# Patient Record
Sex: FEMALE | Race: WHITE | NOT HISPANIC OR LATINO | Employment: OTHER | URBAN - METROPOLITAN AREA
[De-identification: names, ages, dates, MRNs, and addresses within clinical notes are randomized per-mention and may not be internally consistent; named-entity substitution may affect disease eponyms.]

---

## 2018-04-16 RX ORDER — FLUTICASONE PROPIONATE 50 MCG
SPRAY, SUSPENSION (ML) NASAL
COMMUNITY
Start: 2017-12-04 | End: 2019-08-27 | Stop reason: ALTCHOICE

## 2018-04-16 RX ORDER — PANTOPRAZOLE SODIUM 40 MG/1
40 TABLET, DELAYED RELEASE ORAL
COMMUNITY
Start: 2017-12-04 | End: 2018-10-21 | Stop reason: SDUPTHER

## 2018-04-16 RX ORDER — HYDROCHLOROTHIAZIDE 12.5 MG/1
12.5 TABLET ORAL
COMMUNITY
Start: 2017-12-04 | End: 2018-08-27 | Stop reason: SDUPTHER

## 2018-04-16 RX ORDER — RAMIPRIL 10 MG/1
10 CAPSULE ORAL DAILY
COMMUNITY
Start: 2017-12-20 | End: 2018-09-18 | Stop reason: SDUPTHER

## 2018-04-16 RX ORDER — PAROXETINE HYDROCHLORIDE HEMIHYDRATE 12.5 MG/1
12.5 TABLET, FILM COATED, EXTENDED RELEASE ORAL
COMMUNITY
Start: 2017-12-04 | End: 2018-10-28 | Stop reason: SDUPTHER

## 2018-04-16 RX ORDER — EZETIMIBE 10 MG/1
10 TABLET ORAL
COMMUNITY
Start: 2017-12-04 | End: 2018-10-21 | Stop reason: SDUPTHER

## 2018-04-16 RX ORDER — NAPROXEN SODIUM 550 MG/1
550 TABLET ORAL
COMMUNITY
Start: 2017-12-04 | End: 2018-08-03 | Stop reason: SDUPTHER

## 2018-04-16 RX ORDER — ASPIRIN 81 MG/1
81 TABLET ORAL
COMMUNITY
Start: 2016-11-29 | End: 2019-11-19 | Stop reason: ALTCHOICE

## 2018-04-16 RX ORDER — ROSUVASTATIN CALCIUM 40 MG/1
40 TABLET, COATED ORAL
COMMUNITY
Start: 2017-12-26 | End: 2018-12-20 | Stop reason: SDUPTHER

## 2018-04-16 RX ORDER — METOPROLOL SUCCINATE 100 MG/1
100 TABLET, EXTENDED RELEASE ORAL
COMMUNITY
Start: 2017-12-04 | End: 2018-08-27 | Stop reason: SDUPTHER

## 2018-04-17 ENCOUNTER — OFFICE VISIT (OUTPATIENT)
Dept: INTERNAL MEDICINE | Facility: CLINIC | Age: 68
End: 2018-04-17
Attending: NURSE PRACTITIONER
Payer: MEDICARE

## 2018-04-17 VITALS
HEART RATE: 69 BPM | SYSTOLIC BLOOD PRESSURE: 104 MMHG | DIASTOLIC BLOOD PRESSURE: 60 MMHG | HEIGHT: 63 IN | OXYGEN SATURATION: 98 % | WEIGHT: 169 LBS | RESPIRATION RATE: 16 BRPM | TEMPERATURE: 96.4 F | BODY MASS INDEX: 29.95 KG/M2

## 2018-04-17 DIAGNOSIS — R92.8 ABNORMAL MAMMOGRAM: ICD-10-CM

## 2018-04-17 DIAGNOSIS — M19.041 ARTHRITIS OF BOTH HANDS: ICD-10-CM

## 2018-04-17 DIAGNOSIS — R73.9 HYPERGLYCEMIA: ICD-10-CM

## 2018-04-17 DIAGNOSIS — Z91.89 OTHER SPECIFIED PERSONAL RISK FACTORS, NOT ELSEWHERE CLASSIFIED: ICD-10-CM

## 2018-04-17 DIAGNOSIS — Z13.1 ENCOUNTER FOR SCREENING FOR DIABETES MELLITUS: ICD-10-CM

## 2018-04-17 DIAGNOSIS — Z00.00 MEDICARE ANNUAL WELLNESS VISIT, INITIAL: ICD-10-CM

## 2018-04-17 DIAGNOSIS — M19.042 ARTHRITIS OF BOTH HANDS: ICD-10-CM

## 2018-04-17 PROCEDURE — G0438 PPPS, INITIAL VISIT: HCPCS | Performed by: NURSE PRACTITIONER

## 2018-04-17 RX ORDER — HYDROCORTISONE ACETATE 25 MG/1
25 SUPPOSITORY RECTAL 2 TIMES DAILY
Qty: 20 SUPPOSITORY | Refills: 0 | Status: SHIPPED | OUTPATIENT
Start: 2018-04-17 | End: 2019-08-27 | Stop reason: ALTCHOICE

## 2018-04-17 ASSESSMENT — ENCOUNTER SYMPTOMS
ABDOMINAL PAIN: 0
CHILLS: 0
NAUSEA: 0
CHEST TIGHTNESS: 0
DIARRHEA: 0
FEVER: 0
TROUBLE SWALLOWING: 0
RECTAL PAIN: 1
SHORTNESS OF BREATH: 0
VOMITING: 0
SORE THROAT: 0
COUGH: 0
WHEEZING: 0
ARTHRALGIAS: 1
EYE PAIN: 0
PALPITATIONS: 0
CONSTIPATION: 0
ABDOMINAL DISTENTION: 0

## 2018-04-17 ASSESSMENT — MINI COG
TOTAL SCORE: 5
COMPLETED: YES

## 2018-04-17 NOTE — PATIENT INSTRUCTIONS
Decrease ramipril to 1 tab daily    See Dr Shelby Hernandez for the hand    Schedule your colonoscopy

## 2018-04-17 NOTE — PROGRESS NOTES
"    Subjective     Patient ID: Forrest Mueller is a 67 y.o. female.    PT here today for MAW and BP check    She has hemorrhoids  Occasionally they will get inflamed  Rare bright red bleeding          Review of Systems   Constitutional: Negative for chills and fever.   HENT: Negative for ear pain, hearing loss, sore throat and trouble swallowing.    Eyes: Negative for pain and visual disturbance.   Respiratory: Negative for cough, chest tightness, shortness of breath and wheezing.    Cardiovascular: Negative for chest pain, palpitations and leg swelling.   Gastrointestinal: Positive for rectal pain. Negative for abdominal distention, abdominal pain, constipation, diarrhea, nausea and vomiting.   Musculoskeletal: Positive for arthralgias (BL 1st CMC joints the worst).       Objective     Vitals:    04/17/18 0710 04/17/18 0753   BP: 124/82 104/60   BP Location: Right upper arm    Pulse: 69    Resp: 16    Temp: (!) 35.8 °C (96.4 °F)    TempSrc: Tympanic    SpO2: 98%    Weight: 76.7 kg (169 lb)    Height: 1.6 m (5' 3\")      Body mass index is 29.94 kg/m².    Physical Exam   Constitutional: She is oriented to person, place, and time. She appears well-developed and well-nourished.   HENT:   Head: Normocephalic and atraumatic.   Right Ear: External ear normal.   Left Ear: External ear normal.   Nose: Nose normal.   Mouth/Throat: Oropharynx is clear and moist. No oropharyngeal exudate.   Eyes: Conjunctivae are normal.   Neck: Normal range of motion.   Cardiovascular: Normal rate, regular rhythm, normal heart sounds and intact distal pulses.    Pulmonary/Chest: Effort normal and breath sounds normal. No respiratory distress. She has no wheezes.   Musculoskeletal:   BL hands with arthritic changes   Lymphadenopathy:     She has no cervical adenopathy.   Neurological: She is alert and oriented to person, place, and time.   Psychiatric: She has a normal mood and affect. Her behavior is normal. Thought content normal. "       Assessment/Plan   Problem List Items Addressed This Visit     None      Visit Diagnoses     Medicare annual wellness visit, initial        Arthritis of both hands        Abnormal mammogram        Relevant Orders    BI DIAGNOSTIC MAMMOGRAM BILATERAL    Hyperglycemia         Relevant Orders    Hemoglobin A1c    Other specified personal risk factors, not elsewhere classified        Relevant Orders    HEPATITIS C, Medicare Screening    Encounter for screening for diabetes mellitus        Relevant Orders    Hemoglobin A1c          Barak Mueller is a 67 y.o. female who presents for a subsequent annual wellness visit.     Comprehensive Medical and Social History  Patient Active Problem List   Diagnosis   • Blood glucose abnormal   • Chronic coronary artery disease   • Basal cell carcinoma   • Chicken pox   • Cerebral vascular disease   • Chronic depression   • Diverticulosis   • Endometriosis   • Family history of malignant neoplasm of breast   • Family history of cardiovascular disease   • Goiter   • Hemorrhoids   • History of non anemic vitamin B12 deficiency   • Hypertension   • Hyperlipidemia   • Osteoarthritis   • Osteopenia   • Thyroid nodule     Past Medical History:   Diagnosis Date   • Basal cell carcinoma    • Depression    • Diverticulitis of colon    • Endometrioma    • Hypertension    • Lipid disorder    • Osteoarthritis    • Thyroid nodule      Past Surgical History:   Procedure Laterality Date   • DILATION AND CURETTAGE OF UTERUS     • HYSTERECTOMY     • LAPAROTOMY OOPHERECTOMY     • WRIST SURGERY Right      Allergies   Allergen Reactions   • No Known Allergies      Current Outpatient Prescriptions   Medication Sig Dispense Refill   • aspirin (ASPIRIN LOW DOSE) 81 mg enteric coated tablet 81 mg.     • ezetimibe (ZETIA) 10 mg tablet 10 mg.     • fluticasone (FLONASE) 50 mcg/actuation nasal spray inhale 1 spray by intranasal route  every day in each nostril     •  "hydrochlorothiazide (HYDRODIURIL) 12.5 mg tablet 12.5 mg.     • metoprolol succinate XL (TOPROL-XL) 100 mg 24 hr tablet 100 mg.     • naproxen sodium (ANAPROX) 550 mg tablet 550 mg.     • pantoprazole (PROTONIX) 40 mg EC tablet 40 mg.     • PARoxetine CR (PAXIL-CR) 12.5 mg 24 hr tablet 12.5 mg.     • ramipril (ALTACE) 10 mg capsule Take 10 mg by mouth daily.      • rosuvastatin (CRESTOR) 40 mg tablet 40 mg.     • hydrocortisone (ANUSOL-HC) 25 mg suppository Insert 1 suppository (25 mg total) into the rectum 2 (two) times a day for 10 days. 20 suppository 0     No current facility-administered medications for this visit.      Social History     Social History   • Marital status:      Spouse name: N/A   • Number of children: N/A   • Years of education: N/A     Social History Main Topics   • Smoking status: Never Smoker   • Smokeless tobacco: Never Used   • Alcohol use None   • Drug use: Unknown   • Sexual activity: Not Asked     Other Topics Concern   • None     Social History Narrative   • None       Objective   Vitals  Vitals:    04/17/18 0710 04/17/18 0753   BP: 124/82 104/60   BP Location: Right upper arm    Pulse: 69    Resp: 16    Temp: (!) 35.8 °C (96.4 °F)    TempSrc: Tympanic    SpO2: 98%    Weight: 76.7 kg (169 lb)    Height: 1.6 m (5' 3\")      Body mass index is 29.94 kg/m².    Advanced Care Plan  Does patient have advance directive?: Yes       Patient has Advance Directive: Patient has Advance Directive, has not brought in                             PHQ  Over the Past 2 Weeks, Have You Felt Down, Depressed or Hopeless?: no  Over the Past 2 Weeks, Have You Felt Little Interest or Pleasure In Doing Things?: no                                          Mini Cog  Completed: Yes  Score: 5  Result: Negative    Get Up and Go  Result: Pass            STEADI Falls Risk  One or more falls in the last year: No           Has trouble stepping up onto a curb: No   Advised to use a cane or walker to get around " safely: No   Often has to rush to the toilet: No   Feels unsteady when walking: No   Has lost some feeling in feet: No   Often feels sad or depressed: No   Steadies self on furniture while walking at home: No   Takes medication that makes him/her feel lightheaded or more tired than usual: No   Worried about falling: No   Takes medicine to sleep or improve mood: No   Needs to push with hands when rising from a chair: No   Falls screen completed: Yes       Hearing and Vision Screening  Vision Screening Comments: Sees eye doctor      Assessment/Plan   Problem List Items Addressed This Visit     None      Visit Diagnoses     Medicare annual wellness visit, initial        Arthritis of both hands        Abnormal mammogram        Relevant Orders    BI DIAGNOSTIC MAMMOGRAM BILATERAL    Hyperglycemia         Relevant Orders    Hemoglobin A1c    Other specified personal risk factors, not elsewhere classified        Relevant Orders    HEPATITIS C, Medicare Screening    Encounter for screening for diabetes mellitus        Relevant Orders    Hemoglobin A1c          See Patient Instructions (the written plan) which was given to the patient for PPPS and health risk factors with interventions.     1. Medicare annual wellness visit, initial  See scanned sheets.  PT doing quite well.      2. Arthritis of both hands  PT will follow up with hand specialist for ongoing BL 1st CMC pain.    3. Abnormal mammogram  Due for repeat mammo in May  - BI DIAGNOSTIC MAMMOGRAM BILATERAL; Future    4. Hyperglycemia   Check A1c, screening  - Hemoglobin A1c; Future    5. Other specified personal risk factors, not elsewhere classified  Check hep c screen  - HEPATITIS C, Medicare Screening; Future    6. Encounter for screening for diabetes mellitus  Check a1c  - Hemoglobin A1c; Future    NELSON Romo

## 2018-05-31 ENCOUNTER — TELEPHONE (OUTPATIENT)
Dept: INTERNAL MEDICINE | Facility: CLINIC | Age: 68
End: 2018-05-31

## 2018-05-31 ENCOUNTER — CLINICAL SUPPORT (OUTPATIENT)
Dept: INTERNAL MEDICINE | Facility: CLINIC | Age: 68
End: 2018-05-31
Payer: MEDICARE

## 2018-05-31 VITALS
OXYGEN SATURATION: 98 % | TEMPERATURE: 97.8 F | HEART RATE: 64 BPM | SYSTOLIC BLOOD PRESSURE: 130 MMHG | DIASTOLIC BLOOD PRESSURE: 84 MMHG

## 2018-05-31 DIAGNOSIS — Z12.39 SCREENING FOR BREAST CANCER: ICD-10-CM

## 2018-05-31 DIAGNOSIS — I10 ESSENTIAL HYPERTENSION: Primary | ICD-10-CM

## 2018-05-31 PROCEDURE — 99211 OFF/OP EST MAY X REQ PHY/QHP: CPT | Performed by: NURSE PRACTITIONER

## 2018-06-07 ENCOUNTER — HOSPITAL ENCOUNTER (OUTPATIENT)
Dept: RADIOLOGY | Age: 68
Discharge: HOME | End: 2018-06-07
Attending: RADIOLOGY
Payer: MEDICARE

## 2018-06-07 ENCOUNTER — HOSPITAL ENCOUNTER (OUTPATIENT)
Dept: RADIOLOGY | Age: 68
Discharge: HOME | End: 2018-06-07
Attending: NURSE PRACTITIONER
Payer: MEDICARE

## 2018-06-07 DIAGNOSIS — R92.8 ABNORMAL MAMMOGRAM: ICD-10-CM

## 2018-06-07 DIAGNOSIS — Z12.39 SCREENING FOR BREAST CANCER: ICD-10-CM

## 2018-06-07 PROCEDURE — 77066 DX MAMMO INCL CAD BI: CPT

## 2018-06-07 PROCEDURE — 76642 ULTRASOUND BREAST LIMITED: CPT | Mod: LT

## 2018-07-17 ENCOUNTER — APPOINTMENT (OUTPATIENT)
Dept: LAB | Facility: HOSPITAL | Age: 68
End: 2018-07-17
Attending: NURSE PRACTITIONER
Payer: COMMERCIAL

## 2018-07-17 ENCOUNTER — OFFICE VISIT (OUTPATIENT)
Dept: INTERNAL MEDICINE | Facility: CLINIC | Age: 68
End: 2018-07-17
Payer: COMMERCIAL

## 2018-07-17 VITALS
SYSTOLIC BLOOD PRESSURE: 112 MMHG | OXYGEN SATURATION: 97 % | TEMPERATURE: 98.2 F | DIASTOLIC BLOOD PRESSURE: 80 MMHG | HEART RATE: 69 BPM | BODY MASS INDEX: 30.19 KG/M2 | WEIGHT: 170.4 LBS

## 2018-07-17 DIAGNOSIS — M79.642 BILATERAL HAND PAIN: ICD-10-CM

## 2018-07-17 DIAGNOSIS — M79.641 BILATERAL HAND PAIN: ICD-10-CM

## 2018-07-17 DIAGNOSIS — Z91.89 OTHER SPECIFIED PERSONAL RISK FACTORS, NOT ELSEWHERE CLASSIFIED: ICD-10-CM

## 2018-07-17 DIAGNOSIS — I10 ESSENTIAL HYPERTENSION: Primary | ICD-10-CM

## 2018-07-17 DIAGNOSIS — Z13.1 ENCOUNTER FOR SCREENING FOR DIABETES MELLITUS: ICD-10-CM

## 2018-07-17 DIAGNOSIS — F32.A CHRONIC DEPRESSION: ICD-10-CM

## 2018-07-17 DIAGNOSIS — R73.9 HYPERGLYCEMIA: ICD-10-CM

## 2018-07-17 LAB
EST. AVERAGE GLUCOSE BLD GHB EST-MCNC: 114 MG/DL
HBA1C MFR BLD HPLC: 5.6 %
HCV AB SER QL: NONREACTIVE S/CO

## 2018-07-17 PROCEDURE — 83036 HEMOGLOBIN GLYCOSYLATED A1C: CPT

## 2018-07-17 PROCEDURE — G0472 HEP C SCREEN HIGH RISK/OTHER: HCPCS | Mod: GZ

## 2018-07-17 PROCEDURE — 99214 OFFICE O/P EST MOD 30 MIN: CPT | Performed by: NURSE PRACTITIONER

## 2018-07-17 PROCEDURE — 36415 COLL VENOUS BLD VENIPUNCTURE: CPT

## 2018-07-17 ASSESSMENT — ENCOUNTER SYMPTOMS
PALPITATIONS: 0
SHORTNESS OF BREATH: 0
SORE THROAT: 0
NAUSEA: 0
FEVER: 0
CHILLS: 0
VOMITING: 0
NECK STIFFNESS: 0
WHEEZING: 0
ABDOMINAL PAIN: 0
TROUBLE SWALLOWING: 0
JOINT SWELLING: 0
ARTHRALGIAS: 1
CHEST TIGHTNESS: 0
DIARRHEA: 0
CONSTIPATION: 0
ABDOMINAL DISTENTION: 0
COUGH: 0
EYE PAIN: 0
NECK PAIN: 0

## 2018-07-17 NOTE — PROGRESS NOTES
Subjective     Patient ID: Forrest Mueller is a 67 y.o. female.    Ongoing BL hand pain  Sometimes burning  Hasn't yet f/u with specialist  In the joints    Needs blood drawn          Review of Systems   Constitutional: Negative for chills and fever.   HENT: Negative for ear pain, hearing loss, sore throat and trouble swallowing.    Eyes: Negative for pain and visual disturbance.   Respiratory: Negative for cough, chest tightness, shortness of breath and wheezing.    Cardiovascular: Negative for chest pain, palpitations and leg swelling.   Gastrointestinal: Negative for abdominal distention, abdominal pain, constipation, diarrhea, nausea and vomiting.   Musculoskeletal: Positive for arthralgias (BL hands). Negative for gait problem, joint swelling, neck pain and neck stiffness.       Objective     Vitals:    07/17/18 0653   BP: 112/80   Pulse: 69   Temp: 36.8 °C (98.2 °F)   SpO2: 97%   Weight: 77.3 kg (170 lb 6.4 oz)     Body mass index is 30.19 kg/m².    Physical Exam   Constitutional: She is oriented to person, place, and time. She appears well-developed and well-nourished.   HENT:   Head: Normocephalic and atraumatic.   Right Ear: Tympanic membrane and external ear normal.   Left Ear: Tympanic membrane and external ear normal.   Nose: Nose normal.   Mouth/Throat: Oropharynx is clear and moist. No oropharyngeal exudate.   Eyes: Conjunctivae are normal.   Neck: Normal range of motion. Neck supple.   Cardiovascular: Normal rate, regular rhythm, normal heart sounds and intact distal pulses.  Exam reveals no gallop and no friction rub.    No murmur heard.  Pulmonary/Chest: Effort normal and breath sounds normal. No respiratory distress. She has no wheezes. She has no rales.   Abdominal: Soft. Bowel sounds are normal. She exhibits no distension and no mass. There is no tenderness. There is no guarding.   Musculoskeletal: Normal range of motion. She exhibits deformity. She exhibits no edema or tenderness.    Neg phalen and neg tinel sign  BL hands arthritis changes   Lymphadenopathy:     She has no cervical adenopathy.   Neurological: She is alert and oriented to person, place, and time. No cranial nerve deficit or sensory deficit.   Skin: Skin is warm and dry. No rash noted. No erythema.   Psychiatric: She has a normal mood and affect. Her behavior is normal. Judgment and thought content normal.       Assessment/Plan   Problem List Items Addressed This Visit        Other    Chronic depression    Hypertension - Primary      Other Visit Diagnoses     Bilateral hand pain              1. Essential hypertension  Blood pressure with good control.  Continue current regimen.    2. Chronic depression  Affect bright and appropriate.  Patient is stable with Paxil.  Continue current regimen.    3. Bilateral hand pain  Patient will follow up with hand specialist.  She does have strong family history of arthritis and does have arthritic changes in most of her fingers.  X-rays will be done per specialist.    NELSON Romo

## 2018-08-03 RX ORDER — NAPROXEN SODIUM 550 MG/1
TABLET ORAL
Qty: 180 TABLET | Refills: 1 | Status: SHIPPED | OUTPATIENT
Start: 2018-08-03 | End: 2019-08-27 | Stop reason: ALTCHOICE

## 2018-08-27 RX ORDER — HYDROCHLOROTHIAZIDE 12.5 MG/1
TABLET ORAL
Qty: 90 TABLET | Refills: 2 | Status: SHIPPED | OUTPATIENT
Start: 2018-08-27 | End: 2019-05-14 | Stop reason: SDUPTHER

## 2018-08-27 RX ORDER — METOPROLOL SUCCINATE 100 MG/1
TABLET, EXTENDED RELEASE ORAL
Qty: 90 TABLET | Refills: 2 | Status: SHIPPED | OUTPATIENT
Start: 2018-08-27 | End: 2019-05-14 | Stop reason: SDUPTHER

## 2018-09-18 RX ORDER — RAMIPRIL 10 MG/1
CAPSULE ORAL
Qty: 180 CAPSULE | Refills: 2 | Status: SHIPPED | OUTPATIENT
Start: 2018-09-18 | End: 2019-05-14 | Stop reason: SDUPTHER

## 2018-10-22 RX ORDER — EZETIMIBE 10 MG/1
TABLET ORAL
Qty: 90 TABLET | Refills: 2 | Status: SHIPPED | OUTPATIENT
Start: 2018-10-22 | End: 2019-05-14 | Stop reason: SDUPTHER

## 2018-10-22 RX ORDER — PANTOPRAZOLE SODIUM 40 MG/1
TABLET, DELAYED RELEASE ORAL
Qty: 90 TABLET | Refills: 2 | Status: SHIPPED | OUTPATIENT
Start: 2018-10-22 | End: 2019-05-14 | Stop reason: SDUPTHER

## 2018-10-23 ENCOUNTER — OFFICE VISIT (OUTPATIENT)
Dept: INTERNAL MEDICINE | Facility: CLINIC | Age: 68
End: 2018-10-23
Payer: COMMERCIAL

## 2018-10-23 VITALS
TEMPERATURE: 98 F | DIASTOLIC BLOOD PRESSURE: 86 MMHG | SYSTOLIC BLOOD PRESSURE: 132 MMHG | OXYGEN SATURATION: 97 % | HEART RATE: 67 BPM | BODY MASS INDEX: 29.78 KG/M2 | HEIGHT: 64 IN | WEIGHT: 174.4 LBS

## 2018-10-23 DIAGNOSIS — E78.5 HYPERLIPIDEMIA, UNSPECIFIED HYPERLIPIDEMIA TYPE: Primary | ICD-10-CM

## 2018-10-23 DIAGNOSIS — Z23 FLU VACCINE NEED: ICD-10-CM

## 2018-10-23 DIAGNOSIS — I10 ESSENTIAL HYPERTENSION: ICD-10-CM

## 2018-10-23 DIAGNOSIS — F32.A CHRONIC DEPRESSION: ICD-10-CM

## 2018-10-23 DIAGNOSIS — Z12.11 SCREEN FOR COLON CANCER: ICD-10-CM

## 2018-10-23 DIAGNOSIS — R19.4 CHANGE IN BOWEL HABIT: ICD-10-CM

## 2018-10-23 PROCEDURE — 90653 IIV ADJUVANT VACCINE IM: CPT | Performed by: NURSE PRACTITIONER

## 2018-10-23 PROCEDURE — G0008 ADMIN INFLUENZA VIRUS VAC: HCPCS | Performed by: NURSE PRACTITIONER

## 2018-10-23 PROCEDURE — 99214 OFFICE O/P EST MOD 30 MIN: CPT | Mod: 25 | Performed by: NURSE PRACTITIONER

## 2018-10-23 NOTE — PROGRESS NOTES
"  Subjective     Patient ID: Forrest Mueller is a 68 y.o. female.    Here today for regular follow up    Change in bowel habits x a few months  Was subtle when it first started  Increased urge in bowel movements, sudden urge to go  This is new for her  Near-incontinence  She is due for screening colonoscopy  Father  from colon [place]              Review of Systems   Constitutional: Negative for chills and fever.   HENT: Negative for ear pain, hearing loss, sore throat and trouble swallowing.    Eyes: Negative for pain and visual disturbance.   Respiratory: Negative for cough, chest tightness, shortness of breath and wheezing.    Cardiovascular: Negative for chest pain, palpitations and leg swelling.   Gastrointestinal: Negative for abdominal distention, abdominal pain, constipation, diarrhea, nausea and vomiting.        Increased urge for bowel movements   Skin: Negative for pallor, rash and wound.       Objective     Vitals:    10/23/18 0704 10/23/18 0714   BP:  132/86   Pulse: 67    Temp: 36.7 °C (98 °F)    SpO2: 97%    Weight: 79.1 kg (174 lb 6.4 oz)    Height: 1.613 m (5' 3.5\")      Body mass index is 30.41 kg/m².    Physical Exam   Constitutional: She is oriented to person, place, and time. She appears well-developed and well-nourished.   HENT:   Head: Normocephalic and atraumatic.   Right Ear: Tympanic membrane and external ear normal.   Left Ear: Tympanic membrane and external ear normal.   Nose: Nose normal.   Mouth/Throat: Oropharynx is clear and moist. No oropharyngeal exudate.   Eyes: Conjunctivae are normal.   Neck: Normal range of motion. Neck supple.   Cardiovascular: Normal rate, regular rhythm, normal heart sounds and intact distal pulses.  Exam reveals no gallop and no friction rub.    No murmur heard.  Pulmonary/Chest: Effort normal and breath sounds normal. No respiratory distress. She has no wheezes. She has no rales.   Abdominal: Soft. Bowel sounds are normal. She exhibits no distension " and no mass. There is no tenderness. There is no rebound and no guarding.   Musculoskeletal: Normal range of motion.   Lymphadenopathy:     She has no cervical adenopathy.   Neurological: She is alert and oriented to person, place, and time. No cranial nerve deficit.   Skin: No rash noted. No erythema.   Psychiatric: She has a normal mood and affect. Her behavior is normal. Judgment and thought content normal.       Assessment/Plan   Problem List Items Addressed This Visit        Other    Chronic depression    Essential hypertension    Hyperlipidemia - Primary      Other Visit Diagnoses     Change in bowel habit        Relevant Orders    Direct Access Colonoscopy [facility]    Screen for colon cancer        Relevant Orders    Direct Access Colonoscopy [facility]    Flu vaccine need        Relevant Orders    Influenza vaccine 65 and older IM preservative free (FluAd) (Completed)        1. Hyperlipidemia, unspecified hyperlipidemia type  Patient tolerating medication well.  No change to current regimen.    2. Essential hypertension  Blood pressure slightly elevated on exam today.  Patient was thinking about stressful events going on recently.  No change to regimen at this time.    3. Chronic depression  Affect bright and appropriate.  Continue current regimen.    4. Change in bowel habit  Patient due for screening colonoscopy.  She denies any blood in the stool.  No belly pains.  She denies diarrhea, it is just that she has increased urgency to have a bowel movement.  - Direct Access Colonoscopy [facility]; Future    5. Screen for colon cancer  As above  - Direct Access Colonoscopy [facility]; Future    6. Flu vaccine need  Flu vaccine given today.  - Influenza vaccine 65 and older IM preservative free (FluAd)    NELSON Romo

## 2018-10-23 NOTE — PATIENT INSTRUCTIONS
Dr Jazmín Iverson or Dr Dayanara England with [facility] (they do have an office at [place])  [phone]      Patient Education   Influenza Virus Vaccine injection  What is this medicine?  INFLUENZA VIRUS VACCINE ([facility]) helps to reduce the risk of getting influenza also known as the flu. The vaccine only helps protect you against some strains of the flu.  This medicine may be used for other purposes; ask your health care provider or pharmacist if you have questions.  COMMON BRAND NAME(S): Afluria, Agriflu, Alfuria, FLUAD, Fluarix, Fluarix Quadrivalent, Flublok, Flublok Quadrivalent, FLUCELVAX, Flulaval, Fluvirin, Fluzone, Fluzone High-Dose, Fluzone Intradermal  What should I tell my health care provider before I take this medicine?  They need to know if you have any of these conditions:  -bleeding disorder like hemophilia  -fever or infection  -Guillain-[place] syndrome or other neurological problems  -immune system problems  -infection with the human immunodeficiency virus (HIV) or AIDS  -low blood platelet counts  -multiple sclerosis  -an unusual or allergic reaction to influenza virus vaccine, latex, other medicines, foods, dyes, or preservatives. Different brands of vaccines contain different allergens. Some may contain latex or eggs. Talk to your doctor about your allergies to make sure that you get the right vaccine.  -pregnant or trying to get pregnant  -breast-feeding  How should I use this medicine?  This vaccine is for injection into a muscle or under the skin. It is given by a health care professional.  A copy of Vaccine Information Statements will be given before each vaccination. Read this sheet carefully each time. The sheet may change frequently.  Talk to your healthcare provider to see which vaccines are right for you. Some vaccines should not be used in all age groups.  Overdosage: If you think you have taken too much of this medicine contact a poison control center or emergency room  at once.  NOTE: This medicine is only for you. Do not share this medicine with others.  What if I miss a dose?  This does not apply.  What may interact with this medicine?  -chemotherapy or radiation therapy  -medicines that lower your immune system like etanercept, anakinra, infliximab, and adalimumab  -medicines that treat or prevent blood clots like warfarin  -phenytoin  -steroid medicines like prednisone or cortisone  -theophylline  -vaccines  This list may not describe all possible interactions. Give your health care provider a list of all the medicines, herbs, non-prescription drugs, or dietary supplements you use. Also tell them if you smoke, drink alcohol, or use illegal drugs. Some items may interact with your medicine.  What should I watch for while using this medicine?  Report any side effects that do not go away within 3 days to your doctor or health care professional. Call your health care provider if any unusual symptoms occur within 6 weeks of receiving this vaccine.  You may still catch the flu, but the illness is not usually as bad. You cannot get the flu from the vaccine. The vaccine will not protect against colds or other illnesses that may cause fever. The vaccine is needed every year.  What side effects may I notice from receiving this medicine?  Side effects that you should report to your doctor or health care professional as soon as possible:  -allergic reactions like skin rash, itching or hives, swelling of the face, lips, or tongue  Side effects that usually do not require medical attention (report to your doctor or health care professional if they continue or are bothersome):  -fever  -headache  -muscle aches and pains  -pain, tenderness, redness, or swelling at the injection site  -tiredness  This list may not describe all possible side effects. Call your doctor for medical advice about side effects. You may report side effects to FDA at 6-683-FDA-3302.  Where should I keep my  medicine?  The vaccine will be given by a health care professional in a clinic, pharmacy, doctor's office, or other health care setting. You will not be given vaccine doses to store at home.  NOTE: This sheet is a summary. It may not cover all possible information. If you have questions about this medicine, talk to your doctor, pharmacist, or health care provider.  © 2018 [employer] (2016-07-08 10:07:28)

## 2018-10-24 ASSESSMENT — ENCOUNTER SYMPTOMS
COUGH: 0
CHILLS: 0
DIARRHEA: 0
SHORTNESS OF BREATH: 0
VOMITING: 0
CHEST TIGHTNESS: 0
FEVER: 0
SORE THROAT: 0
PALPITATIONS: 0
WOUND: 0
EYE PAIN: 0
TROUBLE SWALLOWING: 0
ABDOMINAL PAIN: 0
ABDOMINAL DISTENTION: 0
NAUSEA: 0
CONSTIPATION: 0
WHEEZING: 0

## 2018-10-29 RX ORDER — PAROXETINE HYDROCHLORIDE HEMIHYDRATE 12.5 MG/1
TABLET, FILM COATED, EXTENDED RELEASE ORAL
Qty: 90 TABLET | Refills: 2 | Status: SHIPPED | OUTPATIENT
Start: 2018-10-29 | End: 2019-04-12 | Stop reason: SDUPTHER

## 2019-04-15 RX ORDER — PAROXETINE HYDROCHLORIDE HEMIHYDRATE 12.5 MG/1
12.5 TABLET, FILM COATED, EXTENDED RELEASE ORAL
Qty: 90 TABLET | Refills: 2 | Status: SHIPPED | OUTPATIENT
Start: 2019-04-15 | End: 2019-05-14 | Stop reason: SDUPTHER

## 2019-04-15 RX ORDER — ROSUVASTATIN CALCIUM 40 MG/1
40 TABLET, COATED ORAL
Qty: 90 TABLET | Refills: 2 | Status: SHIPPED | OUTPATIENT
Start: 2019-04-15 | End: 2019-05-14 | Stop reason: SDUPTHER

## 2019-05-14 ENCOUNTER — OFFICE VISIT (OUTPATIENT)
Dept: INTERNAL MEDICINE | Facility: CLINIC | Age: 69
End: 2019-05-14
Payer: COMMERCIAL

## 2019-05-14 VITALS
TEMPERATURE: 98.3 F | HEIGHT: 64 IN | SYSTOLIC BLOOD PRESSURE: 112 MMHG | DIASTOLIC BLOOD PRESSURE: 78 MMHG | HEART RATE: 68 BPM | BODY MASS INDEX: 29.19 KG/M2 | WEIGHT: 171 LBS

## 2019-05-14 DIAGNOSIS — R19.4 BOWEL HABIT CHANGES: ICD-10-CM

## 2019-05-14 DIAGNOSIS — R19.7 DIARRHEA, UNSPECIFIED TYPE: ICD-10-CM

## 2019-05-14 DIAGNOSIS — Z00.00 MEDICARE ANNUAL WELLNESS VISIT, SUBSEQUENT: Primary | ICD-10-CM

## 2019-05-14 DIAGNOSIS — F32.5 MAJOR DEPRESSIVE DISORDER WITH SINGLE EPISODE, IN FULL REMISSION (CMS/HCC): ICD-10-CM

## 2019-05-14 DIAGNOSIS — Z13.0 SCREENING, ANEMIA, DEFICIENCY, IRON: ICD-10-CM

## 2019-05-14 DIAGNOSIS — R09.89 DECREASED CAROTID PULSE: ICD-10-CM

## 2019-05-14 DIAGNOSIS — E11.9 DIET-CONTROLLED DIABETES MELLITUS (CMS/HCC): ICD-10-CM

## 2019-05-14 DIAGNOSIS — E78.5 DYSLIPIDEMIA: ICD-10-CM

## 2019-05-14 DIAGNOSIS — Z13.89 SCREENING FOR HEMATURIA OR PROTEINURIA: ICD-10-CM

## 2019-05-14 DIAGNOSIS — Z78.0 MENOPAUSE: ICD-10-CM

## 2019-05-14 DIAGNOSIS — Z12.31 SCREENING MAMMOGRAM, ENCOUNTER FOR: ICD-10-CM

## 2019-05-14 DIAGNOSIS — I10 ESSENTIAL HYPERTENSION: ICD-10-CM

## 2019-05-14 DIAGNOSIS — E55.9 VITAMIN D DEFICIENCY: ICD-10-CM

## 2019-05-14 DIAGNOSIS — E04.1 THYROID NODULE: ICD-10-CM

## 2019-05-14 PROCEDURE — G0439 PPPS, SUBSEQ VISIT: HCPCS | Performed by: INTERNAL MEDICINE

## 2019-05-14 PROCEDURE — 93000 ELECTROCARDIOGRAM COMPLETE: CPT | Performed by: INTERNAL MEDICINE

## 2019-05-14 PROCEDURE — 99214 OFFICE O/P EST MOD 30 MIN: CPT | Mod: 25 | Performed by: INTERNAL MEDICINE

## 2019-05-14 RX ORDER — PANTOPRAZOLE SODIUM 40 MG/1
40 TABLET, DELAYED RELEASE ORAL
Qty: 90 TABLET | Refills: 1 | Status: SHIPPED | OUTPATIENT
Start: 2019-05-14 | End: 2019-11-19 | Stop reason: SDUPTHER

## 2019-05-14 RX ORDER — RAMIPRIL 10 MG/1
20 CAPSULE ORAL
Qty: 180 CAPSULE | Refills: 1 | Status: SHIPPED | OUTPATIENT
Start: 2019-05-14 | End: 2019-08-27 | Stop reason: SDUPTHER

## 2019-05-14 RX ORDER — METOPROLOL SUCCINATE 100 MG/1
100 TABLET, EXTENDED RELEASE ORAL
Qty: 90 TABLET | Refills: 1 | Status: SHIPPED | OUTPATIENT
Start: 2019-05-14 | End: 2019-11-19 | Stop reason: SDUPTHER

## 2019-05-14 RX ORDER — ROSUVASTATIN CALCIUM 40 MG/1
40 TABLET, COATED ORAL
Qty: 90 TABLET | Refills: 1 | Status: SHIPPED | OUTPATIENT
Start: 2019-05-14 | End: 2019-11-19 | Stop reason: SDUPTHER

## 2019-05-14 RX ORDER — EZETIMIBE 10 MG/1
10 TABLET ORAL
Qty: 90 TABLET | Refills: 1 | Status: SHIPPED | OUTPATIENT
Start: 2019-05-14 | End: 2019-11-19 | Stop reason: SDUPTHER

## 2019-05-14 RX ORDER — PAROXETINE HYDROCHLORIDE HEMIHYDRATE 12.5 MG/1
12.5 TABLET, FILM COATED, EXTENDED RELEASE ORAL
Qty: 90 TABLET | Refills: 1 | Status: SHIPPED | OUTPATIENT
Start: 2019-05-14 | End: 2019-11-19 | Stop reason: SDUPTHER

## 2019-05-14 RX ORDER — HYDROCHLOROTHIAZIDE 12.5 MG/1
12.5 TABLET ORAL
Qty: 90 TABLET | Refills: 1 | Status: SHIPPED | OUTPATIENT
Start: 2019-05-14 | End: 2019-11-19 | Stop reason: SDUPTHER

## 2019-05-14 ASSESSMENT — MINI COG
COMPLETED: YES
TOTAL SCORE: 5

## 2019-05-14 ASSESSMENT — VISUAL ACUITY
OD_CC: 20/30
OS_CC: 20/30

## 2019-05-14 NOTE — PROGRESS NOTES
Subjective     Forrest Mueller is a 68 y.o. female who presents for a subsequent annual wellness visit.     Comprehensive Medical and Social History  Patient Active Problem List   Diagnosis   • Blood glucose abnormal   • Chronic coronary artery disease   • Basal cell carcinoma   • Chicken pox   • Cerebral vascular disease   • Chronic depression   • Diverticulosis   • Endometriosis   • Family history of malignant neoplasm of breast   • Family history of cardiovascular disease   • Goiter   • Hemorrhoids   • History of non anemic vitamin B12 deficiency   • Essential hypertension   • Hyperlipidemia   • Osteoarthritis   • Osteopenia   • Thyroid nodule     Past Medical History:   Diagnosis Date   • Basal cell carcinoma    • Depression    • Diverticulitis of colon    • Endometrioma    • Hypertension    • Lipid disorder    • Osteoarthritis    • Thyroid nodule      Past Surgical History:   Procedure Laterality Date   • DILATION AND CURETTAGE OF UTERUS     • HYSTERECTOMY     • LAPAROTOMY OOPHERECTOMY     • WRIST SURGERY Right      Allergies   Allergen Reactions   • No Known Allergies      Current Outpatient Prescriptions   Medication Sig Dispense Refill   • aspirin (ASPIRIN LOW DOSE) 81 mg enteric coated tablet 81 mg.     • ezetimibe (ZETIA) 10 mg tablet Take 1 tablet (10 mg total) by mouth once daily. 90 tablet 1   • fluticasone (FLONASE) 50 mcg/actuation nasal spray inhale 1 spray by intranasal route  every day in each nostril     • hydrochlorothiazide (HYDRODIURIL) 12.5 mg tablet Take 1 tablet (12.5 mg total) by mouth once daily. 90 tablet 1   • metoprolol succinate XL (TOPROL-XL) 100 mg 24 hr tablet Take 1 tablet (100 mg total) by mouth once daily. 90 tablet 1   • naproxen sodium (ANAPROX) 550 mg tablet TAKE 1 TABLET EVERY 12 HOURS AS NEEDED 180 tablet 1   • pantoprazole (PROTONIX) 40 mg EC tablet Take 1 tablet (40 mg total) by mouth once daily. 90 tablet 1   • PARoxetine CR (PAXIL-CR) 12.5 mg 24 hr tablet Take 1  "tablet (12.5 mg total) by mouth once daily. 90 tablet 1   • ramipril (ALTACE) 10 mg capsule Take 2 capsules (20 mg total) by mouth once daily. 180 capsule 1   • rosuvastatin (CRESTOR) 40 mg tablet Take 1 tablet (40 mg total) by mouth once daily. 90 tablet 1     No current facility-administered medications for this visit.      Social History     Social History   • Marital status:      Spouse name: N/A   • Number of children: N/A   • Years of education: N/A     Social History Main Topics   • Smoking status: Never Smoker   • Smokeless tobacco: Never Used   • Alcohol use None   • Drug use: Unknown   • Sexual activity: Not Asked     Other Topics Concern   • None     Social History Narrative   • None       Objective   Vitals  Vitals:    05/14/19 0714   BP: 112/78   Temp: 36.8 °C (98.3 °F)   TempSrc: Oral   Weight: 77.6 kg (171 lb)   Height: 1.613 m (5' 3.5\")     Body mass index is 29.82 kg/m².    Advanced Care Plan  Does patient have advance directive?: Yes       Patient has Advance Directive: Advance Directive in physical chart                             PHQ  Over the Past 2 Weeks, Have You Felt Down, Depressed or Hopeless?: no  Over the Past 2 Weeks, Have You Felt Little Interest or Pleasure In Doing Things?: no                                          Mini Cog  Completed: Yes  Score: 5  Result: Negative    Get Up and Go  Result: Pass            STEADI Falls Risk  One or more falls in the last year: No           Has trouble stepping up onto a curb: No   Advised to use a cane or walker to get around safely: No   Often has to rush to the toilet: Yes   Feels unsteady when walking: No   Has lost some feeling in feet: No   Often feels sad or depressed: No   Steadies self on furniture while walking at home: No   Takes medication that makes him/her feel lightheaded or more tired than usual: No   Worried about falling: No   Takes medicine to sleep or improve mood: Yes   Needs to push with hands when rising from a chair: " No   Falls screen completed: Yes       Hearing and Vision Screening   Visual Acuity Screening    Right eye Left eye Both eyes   Without correction:      With correction: 20/30 20/30 20/25   Hearing Screening Comments: Adequate      Assessment/Plan   Diagnoses and all orders for this visit:    Medicare annual wellness visit, subsequent (Primary)    Essential hypertension  -     ECG 12 LEAD OFFICE PERFORMED    Thyroid nodule    Bowel habit changes  -     Ambulatory referral to Gastroenterology; Future    Diarrhea, unspecified type    Decreased carotid pulse    Screening mammogram, encounter for    Menopause    Diet-controlled diabetes mellitus (CMS/HCC) (HCC)    Screening, anemia, deficiency, iron    Dyslipidemia    Screening for hematuria or proteinuria    Vitamin D deficiency    Other orders  -     ezetimibe (ZETIA) 10 mg tablet; Take 1 tablet (10 mg total) by mouth once daily.  -     hydrochlorothiazide (HYDRODIURIL) 12.5 mg tablet; Take 1 tablet (12.5 mg total) by mouth once daily.  -     metoprolol succinate XL (TOPROL-XL) 100 mg 24 hr tablet; Take 1 tablet (100 mg total) by mouth once daily.  -     pantoprazole (PROTONIX) 40 mg EC tablet; Take 1 tablet (40 mg total) by mouth once daily.  -     PARoxetine CR (PAXIL-CR) 12.5 mg 24 hr tablet; Take 1 tablet (12.5 mg total) by mouth once daily.  -     ramipril (ALTACE) 10 mg capsule; Take 2 capsules (20 mg total) by mouth once daily.  -     rosuvastatin (CRESTOR) 40 mg tablet; Take 1 tablet (40 mg total) by mouth once daily.        See Patient Instructions (the written plan) which was given to the patient for PPPS and health risk factors with interventions.

## 2019-05-14 NOTE — PATIENT INSTRUCTIONS
1.  Add bulk to the stool by taking Metamucil.  You can also take a FiberCon tablet.  Metamucil also comes in away for.  Start with a half away for nightly.  Do not take the fiber at the same time you take other medications.  It will decrease the absorption.  Be sure to drink enough water.  40 to 64 ounces a day.

## 2019-05-14 NOTE — PROGRESS NOTES
Subjective      Medicare care annual well    Follow-up  Patient ID: Forrest Mueller is a 68 y.o. female.    HPI Medicare annual well    Essential hypertension  Monitor response to therapy    Hyperlipidemia  Monitor response to therapy    Change in bowel habits.  Patient has had diarrheal stool for the last several weeks.  It is worse after drinking a sip of coffee.  This is new for the patient.  No fever chills.  No exotic travel.  Family history of colon cancer.  No recent antibiotic use.    The following have been reviewed and updated as appropriate in this visit:  Tobacco  Allergies  Meds  Problems         Allergies   Allergen Reactions   • No Known Allergies        Current Outpatient Prescriptions   Medication Sig Dispense Refill   • aspirin (ASPIRIN LOW DOSE) 81 mg enteric coated tablet 81 mg.     • ezetimibe (ZETIA) 10 mg tablet Take 1 tablet (10 mg total) by mouth once daily. 90 tablet 1   • fluticasone (FLONASE) 50 mcg/actuation nasal spray inhale 1 spray by intranasal route  every day in each nostril     • hydrochlorothiazide (HYDRODIURIL) 12.5 mg tablet Take 1 tablet (12.5 mg total) by mouth once daily. 90 tablet 1   • metoprolol succinate XL (TOPROL-XL) 100 mg 24 hr tablet Take 1 tablet (100 mg total) by mouth once daily. 90 tablet 1   • naproxen sodium (ANAPROX) 550 mg tablet TAKE 1 TABLET EVERY 12 HOURS AS NEEDED 180 tablet 1   • pantoprazole (PROTONIX) 40 mg EC tablet Take 1 tablet (40 mg total) by mouth once daily. 90 tablet 1   • PARoxetine CR (PAXIL-CR) 12.5 mg 24 hr tablet Take 1 tablet (12.5 mg total) by mouth once daily. 90 tablet 1   • ramipril (ALTACE) 10 mg capsule Take 2 capsules (20 mg total) by mouth once daily. 180 capsule 1   • rosuvastatin (CRESTOR) 40 mg tablet Take 1 tablet (40 mg total) by mouth once daily. 90 tablet 1     No current facility-administered medications for this visit.        Past Medical History:   Diagnosis Date   • Basal cell carcinoma    • Depression  "   • Diverticulitis of colon    • Endometrioma    • Hypertension    • Lipid disorder    • Osteoarthritis    • Thyroid nodule      Family History   Problem Relation Age of Onset   • Breast cancer Mother 80   • Hypertension Mother    • Stroke Mother    • Colon cancer Father    • Hypertension Father    • Stroke Paternal Grandmother    • Colon cancer Father's Brother    • Colon cancer Cousin    • Stroke Father's Sister      Social History     Social History   • Marital status:      Spouse name: N/A   • Number of children: N/A   • Years of education: N/A     Occupational History   • Not on file.     Social History Main Topics   • Smoking status: Never Smoker   • Smokeless tobacco: Never Used   • Alcohol use Not on file   • Drug use: Unknown   • Sexual activity: Not on file     Other Topics Concern   • Not on file     Social History Narrative   • No narrative on file       Review of Systems    Objective     /78   Temp 36.8 °C (98.3 °F) (Oral)   Ht 1.613 m (5' 3.5\")   Wt 77.6 kg (171 lb)   LMP  (LMP Unknown)   Breastfeeding? No   BMI 29.82 kg/m²   BP Readings from Last 3 Encounters:   05/14/19 112/78   10/23/18 132/86   07/17/18 112/80     Wt Readings from Last 3 Encounters:   05/14/19 77.6 kg (171 lb)   10/23/18 79.1 kg (174 lb 6.4 oz)   07/17/18 77.3 kg (170 lb 6.4 oz)       Physical Exam   Constitutional: She is oriented to person, place, and time. She appears well-developed and well-nourished. No distress.   HENT:   Head: Normocephalic and atraumatic.   Right Ear: External ear normal.   Left Ear: External ear normal.   Mouth/Throat: Oropharynx is clear and moist. No oropharyngeal exudate.   Eyes: Pupils are equal, round, and reactive to light.   Neck: No JVD present. No tracheal deviation present. Thyromegaly present.   Thyroid irregular   Cardiovascular: Normal rate, regular rhythm, S1 normal and S2 normal.  Exam reveals no S3 and no S4.    No murmur heard.  Pulses:       Carotid pulses are 1+ on " the right side, and 2+ on the left side.       Dorsalis pedis pulses are 2+ on the right side, and 2+ on the left side.        Posterior tibial pulses are 2+ on the right side, and 2+ on the left side.   No bruits bilaterally   Pulmonary/Chest: No stridor. No respiratory distress. She has no decreased breath sounds. She has no wheezes. She has no rhonchi. She has no rales.   Breasts with no masses, no nipple discharge, no skin retraction, no axillary nodes bilaterally.      Abdominal: Soft. Normal appearance and bowel sounds are normal. She exhibits no distension, no abdominal bruit and no ascites. There is no hepatosplenomegaly. There is no tenderness.   Musculoskeletal: She exhibits no edema.   Lymphadenopathy:     She has no cervical adenopathy.        Right cervical: No superficial cervical and no posterior cervical adenopathy present.       Left cervical: No superficial cervical and no posterior cervical adenopathy present.        Right: No supraclavicular adenopathy present.        Left: No supraclavicular adenopathy present.   Neurological: She is alert and oriented to person, place, and time.   Reflex Scores:       Patellar reflexes are 2+ on the right side and 2+ on the left side.  Cranial nerves II through XII grossly intact, motor +5/5 bilaterally upper extremity and lower extremity, cerebellar without ataxia finger to nose, heel to shin.  Deep tendon reflexes 2+ bilateral upper extremity and lower extremity.     Skin: Skin is warm, dry and intact. No rash noted.   Multiple skin lesions    Psychiatric: She has a normal mood and affect. Her speech is normal and behavior is normal.   Nursing note and vitals reviewed.      Lab Results   Component Value Date    WBC 8.84 11/27/2017    HGB 13.1 11/27/2017    HCT 41.2 11/27/2017    MCV 90.0 11/27/2017     11/27/2017     Lab Results   Component Value Date    GLUCOSE 100 (H) 11/27/2017    CALCIUM 9.4 11/27/2017     11/27/2017    K 4.3 11/27/2017     CO2 25 11/27/2017     11/27/2017    BUN 27 (H) 11/27/2017    CREATININE 1.0 11/27/2017         Assessment/Plan   Problem List Items Addressed This Visit     Essential hypertension    Relevant Medications    hydrochlorothiazide (HYDRODIURIL) 12.5 mg tablet    metoprolol succinate XL (TOPROL-XL) 100 mg 24 hr tablet    ramipril (ALTACE) 10 mg capsule    Other Relevant Orders    ECG 12 LEAD OFFICE PERFORMED (Completed)    Thyroid nodule    Relevant Medications    metoprolol succinate XL (TOPROL-XL) 100 mg 24 hr tablet      Other Visit Diagnoses     Medicare annual wellness visit, subsequent    -  Primary    Bowel habit changes        Relevant Orders    Ambulatory referral to Gastroenterology    Diarrhea, unspecified type        Decreased carotid pulse        Screening mammogram, encounter for        Menopause        Diet-controlled diabetes mellitus (CMS/HCC) (HCC)        Screening, anemia, deficiency, iron        Dyslipidemia        Screening for hematuria or proteinuria        Vitamin D deficiency            1. Medicare annual wellness visit, subsequent  See information entered under Medicare annual well.    2. Essential hypertension  Control on current regimen.  Good primary prevention.  - ECG 12 LEAD OFFICE PERFORMED    3. Thyroid nodule  Check TSH, and follow-up on ultrasound.  - TSH 3rd Generation; Future  - ULTRASOUND THYROID; Future    4. Bowel habit changes  She will add bulk to stool.  Rule out infectious etiology.  Work-up for autoimmune or inflammatory etiology.  Patient will be referred to Dr. Columba Iverson.  - Ambulatory referral to Gastroenterology; Future    5. Diarrhea, unspecified type  As outlined above.  - Clostridium difficile tox screen; Future  - Ova and parasite screen; Future  - Stool culture; Future  - Fecal leukocytes; Future  - Celiac reflex panel; Future    6. Decreased carotid pulse  Routine ultrasound due.  Patient risk factors well modified    7.Major depressive disorder with single  episode, in full remission (CMS/[facility])  Cont SSRI.     8. Dyslipidemia  LDL has been at goal.  Patient with strong family of atherosclerotic disease.  Goal will be well below 100.  - Lipid panel; Future    9. Screening mammogram, encounter for  Breast exam today.  - BI SCREENING MAMMOGRAM BILATERAL; Future    10. Menopause  Density due.  - DEXA BONE DENSITY; Future    11. Diet-controlled diabetes mellitus (CMS/[facility]) ([facility])  Doing well with diet.  - Hemoglobin A1c; Future  - Microalbumin/Creatinine Ur Random; Future    12. Screening, anemia, deficiency, iron      - CBC and Differential; Future  - Comprehensive metabolic panel; Future    13. Screening for hematuria or proteinuria      - Urinalysis with microscopic; Future    14. Vitamin D deficiency      - Vitamin D 25 hydroxy; Future    - Ultrasound carotid bilateral; Future    Sierra Montanez, DO  5/14/2019

## 2019-05-20 ENCOUNTER — APPOINTMENT (OUTPATIENT)
Dept: LAB | Facility: HOSPITAL | Age: 69
End: 2019-05-20
Attending: INTERNAL MEDICINE
Payer: COMMERCIAL

## 2019-05-20 ENCOUNTER — TRANSCRIBE ORDERS (OUTPATIENT)
Dept: INTERNAL MEDICINE | Facility: CLINIC | Age: 69
End: 2019-05-20

## 2019-05-20 DIAGNOSIS — E04.1 THYROID NODULE: ICD-10-CM

## 2019-05-20 DIAGNOSIS — D50.8 OTHER IRON DEFICIENCY ANEMIA: Primary | ICD-10-CM

## 2019-05-20 DIAGNOSIS — E78.5 DYSLIPIDEMIA: ICD-10-CM

## 2019-05-20 DIAGNOSIS — Z13.89 SCREENING FOR HEMATURIA OR PROTEINURIA: ICD-10-CM

## 2019-05-20 DIAGNOSIS — E11.9 DIET-CONTROLLED DIABETES MELLITUS (CMS/HCC): ICD-10-CM

## 2019-05-20 DIAGNOSIS — Z13.0 SCREENING, ANEMIA, DEFICIENCY, IRON: ICD-10-CM

## 2019-05-20 DIAGNOSIS — R19.7 DIARRHEA, UNSPECIFIED TYPE: ICD-10-CM

## 2019-05-20 DIAGNOSIS — E55.9 VITAMIN D DEFICIENCY: ICD-10-CM

## 2019-05-20 LAB
25(OH)D3 SERPL-MCNC: 32 NG/ML (ref 30–100)
ALBUMIN SERPL-MCNC: 3.6 G/DL (ref 3.4–5)
ALBUMIN/CREAT UR: 7.5 UG/MG
ALP SERPL-CCNC: 54 IU/L (ref 35–126)
ALT SERPL-CCNC: 21 IU/L (ref 11–54)
ANION GAP SERPL CALC-SCNC: 7 MEQ/L (ref 3–15)
AST SERPL-CCNC: 22 IU/L (ref 15–41)
BACTERIA URNS QL MICRO: ABNORMAL /HPF
BASOPHILS # BLD: 0.06 K/UL (ref 0.01–0.1)
BASOPHILS NFR BLD: 0.7 %
BILIRUB SERPL-MCNC: 0.8 MG/DL (ref 0.3–1.2)
BILIRUB UR QL STRIP.AUTO: NEGATIVE MG/DL
BUN SERPL-MCNC: 22 MG/DL (ref 8–20)
CALCIUM SERPL-MCNC: 9.2 MG/DL (ref 8.9–10.3)
CHLORIDE SERPL-SCNC: 107 MEQ/L (ref 98–109)
CHOLEST SERPL-MCNC: 154 MG/DL
CLARITY UR REFRACT.AUTO: CLEAR
CO2 SERPL-SCNC: 27 MEQ/L (ref 22–32)
COLOR UR AUTO: YELLOW
CREAT SERPL-MCNC: 1 MG/DL
CREAT UR-MCNC: 157.7 MG/DL
DIFFERENTIAL METHOD BLD: NORMAL
EOSINOPHIL # BLD: 0.31 K/UL (ref 0.04–0.36)
EOSINOPHIL NFR BLD: 3.8 %
ERYTHROCYTE [DISTWIDTH] IN BLOOD BY AUTOMATED COUNT: 14.7 % (ref 11.7–14.4)
EST. AVERAGE GLUCOSE BLD GHB EST-MCNC: 128 MG/DL
GFR SERPL CREATININE-BSD FRML MDRD: 55.3 ML/MIN/1.73M*2
GLUCOSE SERPL-MCNC: 111 MG/DL (ref 70–99)
GLUCOSE UR STRIP.AUTO-MCNC: NEGATIVE MG/DL
HBA1C MFR BLD HPLC: 6.1 %
HCT VFR BLDCO AUTO: 40.2 %
HDLC SERPL-MCNC: 55 MG/DL
HDLC SERPL: 2.8 {RATIO}
HGB BLD-MCNC: 12.6 G/DL
HGB UR QL STRIP.AUTO: NEGATIVE
HYALINE CASTS #/AREA URNS LPF: ABNORMAL /LPF
IMM GRANULOCYTES # BLD AUTO: 0.03 K/UL (ref 0–0.08)
IMM GRANULOCYTES NFR BLD AUTO: 0.4 %
KETONES UR STRIP.AUTO-MCNC: NEGATIVE MG/DL
LDLC SERPL CALC-MCNC: 84 MG/DL
LEUKOCYTE ESTERASE UR QL STRIP.AUTO: NEGATIVE
LYMPHOCYTES # BLD: 1.61 K/UL (ref 1.2–3.5)
LYMPHOCYTES NFR BLD: 19.5 %
MCH RBC QN AUTO: 28.2 PG (ref 28–33.2)
MCHC RBC AUTO-ENTMCNC: 31.3 G/DL (ref 32.2–35.5)
MCV RBC AUTO: 89.9 FL (ref 83–98)
MICROALBUMIN UR-MCNC: 11.8 MG/L
MONOCYTES # BLD: 0.72 K/UL (ref 0.28–0.8)
MONOCYTES NFR BLD: 8.7 %
NEUTROPHILS # BLD: 5.52 K/UL (ref 1.7–7)
NEUTS SEG NFR BLD: 66.9 %
NITRITE UR QL STRIP.AUTO: NEGATIVE
NONHDLC SERPL-MCNC: 99 MG/DL
NRBC BLD-RTO: 0 %
PDW BLD AUTO: 11.6 FL (ref 9.4–12.3)
PH UR STRIP.AUTO: 5.5 [PH]
PLATELET # BLD AUTO: 279 K/UL
POTASSIUM SERPL-SCNC: 4.6 MEQ/L (ref 3.6–5.1)
PROT SERPL-MCNC: 6.1 G/DL (ref 6–8.2)
PROT UR QL STRIP.AUTO: NEGATIVE
RBC # BLD AUTO: 4.47 M/UL (ref 3.93–5.22)
RBC #/AREA URNS HPF: ABNORMAL /HPF
SODIUM SERPL-SCNC: 141 MEQ/L (ref 136–144)
SP GR UR REFRACT.AUTO: 1.03
SQUAMOUS URNS QL MICRO: 1 /HPF
TRIGL SERPL-MCNC: 74 MG/DL (ref 30–149)
TSH SERPL DL<=0.05 MIU/L-ACNC: 0.57 MIU/L (ref 0.34–5.6)
UROBILINOGEN UR STRIP-ACNC: 0.2 EU/DL
WBC # BLD AUTO: 8.25 K/UL
WBC #/AREA URNS HPF: ABNORMAL /HPF
WBC STL QL MB STN: NORMAL

## 2019-05-20 PROCEDURE — 85025 COMPLETE CBC W/AUTO DIFF WBC: CPT

## 2019-05-20 PROCEDURE — 82784 ASSAY IGA/IGD/IGG/IGM EACH: CPT

## 2019-05-20 PROCEDURE — 84443 ASSAY THYROID STIM HORMONE: CPT

## 2019-05-20 PROCEDURE — 82043 UR ALBUMIN QUANTITATIVE: CPT

## 2019-05-20 PROCEDURE — 83036 HEMOGLOBIN GLYCOSYLATED A1C: CPT

## 2019-05-20 PROCEDURE — 83516 IMMUNOASSAY NONANTIBODY: CPT

## 2019-05-20 PROCEDURE — 80061 LIPID PANEL: CPT

## 2019-05-20 PROCEDURE — 89055 LEUKOCYTE ASSESSMENT FECAL: CPT

## 2019-05-20 PROCEDURE — 81001 URINALYSIS AUTO W/SCOPE: CPT

## 2019-05-20 PROCEDURE — 80053 COMPREHEN METABOLIC PANEL: CPT

## 2019-05-20 PROCEDURE — 82306 VITAMIN D 25 HYDROXY: CPT

## 2019-05-20 PROCEDURE — 36415 COLL VENOUS BLD VENIPUNCTURE: CPT

## 2019-05-20 PROCEDURE — 83540 ASSAY OF IRON: CPT

## 2019-05-20 PROCEDURE — 87177 OVA AND PARASITES SMEARS: CPT

## 2019-05-20 PROCEDURE — 82728 ASSAY OF FERRITIN: CPT

## 2019-05-20 PROCEDURE — 87077 CULTURE AEROBIC IDENTIFY: CPT

## 2019-05-21 LAB — O+P SPEC MICRO: NORMAL

## 2019-05-22 ENCOUNTER — TELEPHONE (OUTPATIENT)
Dept: INTERNAL MEDICINE | Facility: CLINIC | Age: 69
End: 2019-05-22

## 2019-05-22 LAB
FERRITIN SERPL-MCNC: 7 NG/ML (ref 11–250)
IRON SATN MFR SERPL: 14 % (ref 15–45)
IRON SERPL-MCNC: 47 UG/DL (ref 35–150)
TIBC SERPL-MCNC: 326 UG/DL (ref 270–460)
UIBC SERPL-MCNC: 279 UG/DL (ref 180–360)

## 2019-05-22 NOTE — TELEPHONE ENCOUNTER
Carol, Call lab and see if they can add iron, TIBC, ferritin to recent labs. Diag is anemia. Thanks.

## 2019-05-23 ENCOUNTER — TELEPHONE (OUTPATIENT)
Dept: INTERNAL MEDICINE | Facility: CLINIC | Age: 69
End: 2019-05-23

## 2019-05-23 LAB
BACTERIA STL CULT: NORMAL
IGA SERPL-MCNC: 167 MG/DL (ref 85–385)
TTG IGA SER IA-ACNC: 0.4 ELIA U/ML

## 2019-05-24 NOTE — TELEPHONE ENCOUNTER
Call to pt this morning. We reviewed her results and her queries were answered. Pt appreciative of the call.

## 2019-06-10 ENCOUNTER — HOSPITAL ENCOUNTER (OUTPATIENT)
Dept: RADIOLOGY | Age: 69
Discharge: HOME | End: 2019-06-10
Attending: INTERNAL MEDICINE
Payer: COMMERCIAL

## 2019-06-10 ENCOUNTER — HOSPITAL ENCOUNTER (OUTPATIENT)
Dept: CARDIOLOGY | Facility: HOSPITAL | Age: 69
Discharge: HOME | End: 2019-06-10
Attending: INTERNAL MEDICINE
Payer: COMMERCIAL

## 2019-06-10 ENCOUNTER — TRANSCRIBE ORDERS (OUTPATIENT)
Dept: SCHEDULING | Age: 69
End: 2019-06-10

## 2019-06-10 DIAGNOSIS — E04.1 THYROID NODULE: ICD-10-CM

## 2019-06-10 DIAGNOSIS — R09.89 DECREASED CAROTID PULSE: ICD-10-CM

## 2019-06-10 DIAGNOSIS — Z12.31 SCREENING MAMMOGRAM, ENCOUNTER FOR: ICD-10-CM

## 2019-06-10 DIAGNOSIS — M81.0 AGE-RELATED OSTEOPOROSIS WITHOUT CURRENT PATHOLOGICAL FRACTURE: Primary | ICD-10-CM

## 2019-06-10 LAB
LEFT CCA DIST DIAS: 24.07 CM/S
LEFT CCA DIST SYS: 96.75 CM/S
LEFT CCA MID DIAS: 22.45 CM/S
LEFT CCA MID SYS: 77.37 CM/S
LEFT CCA PROX DIAS: 25.68 CM/S
LEFT CCA PROX SYS: 87.06 CM/S
LEFT ECA DIAS: 15.99 CM/S
LEFT ECA SYS: 67.68 CM/S
LEFT ICA DIST DIAS: 31.95 CM/S
LEFT ICA DIST SYS: 92.27 CM/S
LEFT ICA MID DIAS: 35.37 CM/S
LEFT ICA MID SYS: 99.98 CM/S
LEFT ICA PROX DIAS: 25.68 CM/S
LEFT ICA PROX SYS: 85.45 CM/S
LEFT ICA/CCA SYS: 1.03
LEFT VERTEBRAL DIAS: 16.38 CM/S
LEFT VERTEBRAL SYS: 47.15 CM/S
LT ECA PROX EDV: 15.99 CM/S
LT ECA PROX PSV: 67.68 CM/S
LT VERTEBRAL PROX EDV: 16.38 CM/S
LT VERTEBRAL PROX PSV: 47.15 CM/S
RIGHT CCA DIST DIAS: 15.85 CM/S
RIGHT CCA DIST SYS: 63.84 CM/S
RIGHT CCA MID DIAS: 20.21 CM/S
RIGHT CCA MID SYS: 63.84 CM/S
RIGHT CCA PROX DIAS: 14.38 CM/S
RIGHT CCA PROX SYS: 60.29 CM/S
RIGHT ECA DIAS: 15.55 CM/S
RIGHT ECA SYS: 78.54 CM/S
RIGHT ICA DIST DIAS: 15.55 CM/S
RIGHT ICA DIST SYS: 76.57 CM/S
RIGHT ICA MID DIAS: 29.71 CM/S
RIGHT ICA MID SYS: 86.05 CM/S
RIGHT ICA PROX DIAS: 34.52 CM/S
RIGHT ICA PROX SYS: 96.63 CM/S
RIGHT ICA/CCA SYS: 1.51
RIGHT VERTEBRAL DIAS: 27.3 CM/S
RIGHT VERTEBRAL SYS: 74.14 CM/S
RT ECA PROC EDV: 15.55 CM/S
RT VERTEBRAL PROX EDV: 27.3 CM/S

## 2019-06-10 PROCEDURE — 77063 BREAST TOMOSYNTHESIS BI: CPT

## 2019-06-10 PROCEDURE — 76536 US EXAM OF HEAD AND NECK: CPT

## 2019-06-10 PROCEDURE — 93880 EXTRACRANIAL BILAT STUDY: CPT | Mod: 59

## 2019-08-27 ENCOUNTER — OFFICE VISIT (OUTPATIENT)
Dept: INTERNAL MEDICINE | Facility: CLINIC | Age: 69
End: 2019-08-27
Payer: COMMERCIAL

## 2019-08-27 VITALS
WEIGHT: 173.6 LBS | OXYGEN SATURATION: 97 % | BODY MASS INDEX: 29.64 KG/M2 | HEIGHT: 64 IN | RESPIRATION RATE: 18 BRPM | SYSTOLIC BLOOD PRESSURE: 122 MMHG | TEMPERATURE: 98.3 F | DIASTOLIC BLOOD PRESSURE: 76 MMHG | HEART RATE: 87 BPM

## 2019-08-27 DIAGNOSIS — R19.7 DIARRHEA, UNSPECIFIED TYPE: ICD-10-CM

## 2019-08-27 DIAGNOSIS — E78.5 HYPERLIPIDEMIA, UNSPECIFIED HYPERLIPIDEMIA TYPE: ICD-10-CM

## 2019-08-27 DIAGNOSIS — M19.042 OSTEOARTHRITIS OF BOTH HANDS, UNSPECIFIED OSTEOARTHRITIS TYPE: ICD-10-CM

## 2019-08-27 DIAGNOSIS — F32.5 MAJOR DEPRESSIVE DISORDER WITH SINGLE EPISODE, IN FULL REMISSION (CMS/HCC): ICD-10-CM

## 2019-08-27 DIAGNOSIS — M19.041 OSTEOARTHRITIS OF BOTH HANDS, UNSPECIFIED OSTEOARTHRITIS TYPE: ICD-10-CM

## 2019-08-27 DIAGNOSIS — I10 ESSENTIAL HYPERTENSION: Primary | ICD-10-CM

## 2019-08-27 PROCEDURE — 99214 OFFICE O/P EST MOD 30 MIN: CPT | Performed by: INTERNAL MEDICINE

## 2019-08-27 RX ORDER — RAMIPRIL 10 MG/1
20 CAPSULE ORAL
Qty: 60 CAPSULE | Refills: 0 | Status: SHIPPED | OUTPATIENT
Start: 2019-08-27 | End: 2019-11-19 | Stop reason: SDUPTHER

## 2019-08-27 ASSESSMENT — ENCOUNTER SYMPTOMS
DIZZINESS: 0
POLYDIPSIA: 0
ABDOMINAL PAIN: 0
DIARRHEA: 1
FATIGUE: 0
FEVER: 0
SORE THROAT: 0
DIFFICULTY URINATING: 0
CHILLS: 0
SHORTNESS OF BREATH: 0
VOMITING: 0
NAUSEA: 0
BACK PAIN: 0
HEADACHES: 0
COUGH: 0
WHEEZING: 0
PALPITATIONS: 0

## 2019-08-27 NOTE — ASSESSMENT & PLAN NOTE
Has stopped NSAIDs due to gastritis. Using tylenol with some relief. Discussed eval with rheumatology - provided contact info for Dr. Medeiros

## 2019-08-27 NOTE — PROGRESS NOTES
"Subjective      Patient ID: Forrest Mueller is a 69 y.o. female.  1950      HPI   Here for 3 month follow up for BP  Since last visit has had colonoscopy - due to change of bowel habits. C-scope was good but did endoscopy and ulcer was discovered  Off naprosyn which she was using for arthritis - has changed to tylenol. Is scheduled to see hand specialist to have injections to help with the arthritis pain  Diarrhea persists - has used bentyl with little relief, used probiotic 2 tablets daily  Describes diarrhea as first thing in the morning - cramping and has to run to the BR - watery, cramping pain  Next plan is to try eliminating dairy from her diet    The following have been reviewed and updated as appropriate in this visit:       Review of Systems   Constitutional: Negative for chills, fatigue and fever.   HENT: Negative for sore throat.    Respiratory: Negative for cough, shortness of breath and wheezing.    Cardiovascular: Negative for chest pain and palpitations.   Gastrointestinal: Positive for diarrhea. Negative for abdominal pain, nausea and vomiting.   Endocrine: Negative for polydipsia and polyuria.   Genitourinary: Negative for difficulty urinating.   Musculoskeletal: Negative for back pain.   Neurological: Negative for dizziness and headaches.       Objective     Vitals:    08/27/19 0815 08/27/19 0844   BP:  122/76   BP Location:  Left upper arm   Patient Position:  Sitting   Pulse: 87    Resp: 18    Temp: 36.8 °C (98.3 °F)    SpO2: 97%    Weight: 78.7 kg (173 lb 9.6 oz)    Height: 1.613 m (5' 3.5\")      Body mass index is 30.27 kg/m².    Physical Exam   Constitutional: She is oriented to person, place, and time. She appears well-developed.   HENT:   Head: Normocephalic.   Right Ear: External ear normal.   Left Ear: External ear normal.   Mouth/Throat: Oropharynx is clear and moist.   Eyes: Conjunctivae are normal.   Neck: Normal range of motion.   Cardiovascular: Normal rate, regular " rhythm and normal heart sounds.    Pulmonary/Chest: Effort normal and breath sounds normal.   Abdominal: Soft. Bowel sounds are normal.   Musculoskeletal: Normal range of motion.   Lymphadenopathy:     She has no cervical adenopathy.   Neurological: She is alert and oriented to person, place, and time.   Skin: Skin is warm and dry.   Psychiatric: She has a normal mood and affect.       Assessment/Plan   Diagnoses and all orders for this visit:    Essential hypertension (Primary)  Assessment & Plan:  BP in a good range today  Continue same meds      Osteoarthritis of both hands, unspecified osteoarthritis type  Assessment & Plan:  Has stopped NSAIDs due to gastritis. Using tylenol with some relief. Discussed eval with rheumatology - provided contact info for Dr. Medeiros      Diarrhea, unspecified type  Comments:  Ongoing. C-scope normal, celiac negative. Trial eliminating dairy. Discussed continuing probiotic, add fiber    Other orders  -     ramipril (ALTACE) 10 mg capsule; Take 2 capsules (20 mg total) by mouth once daily.    1. Essential hypertension  Good control on current regimen.     2. Osteoarthritis of both hands, unspecified osteoarthritis type  Pt has follow up w Dr Medeiros.     3. Diarrhea, unspecified type  Stop the dairy, add fiber, cont probiotic.     4. Hyperlipidemia, unspecified hyperlipidemia type  Cont current dose of statin     5. Major depressive disorder with single episode, in full remission (CMS/HCC)  In remission.

## 2019-08-27 NOTE — PATIENT INSTRUCTIONS
Stop all dairy products  Continue probiotic  Add some fiber - meatmucil or konsyl  Be sure you are staying well hydrated

## 2019-09-24 ENCOUNTER — TRANSCRIBE ORDERS (OUTPATIENT)
Dept: SCHEDULING | Age: 69
End: 2019-09-24

## 2019-09-24 DIAGNOSIS — M25.50 PAIN IN JOINT: ICD-10-CM

## 2019-09-24 DIAGNOSIS — M25.541 ARTHRALGIA OF RIGHT HAND: ICD-10-CM

## 2019-09-24 DIAGNOSIS — M25.542 ARTHRALGIA OF LEFT HAND: ICD-10-CM

## 2019-09-24 DIAGNOSIS — K25.9 GASTRIC ULCER WITHOUT HEMORRHAGE OR PERFORATION: Primary | ICD-10-CM

## 2019-09-27 ENCOUNTER — HOSPITAL ENCOUNTER (OUTPATIENT)
Dept: RADIOLOGY | Facility: CLINIC | Age: 69
Discharge: HOME | End: 2019-09-27
Attending: INTERNAL MEDICINE
Payer: COMMERCIAL

## 2019-09-27 DIAGNOSIS — K25.9 GASTRIC ULCER WITHOUT HEMORRHAGE OR PERFORATION: ICD-10-CM

## 2019-09-27 PROCEDURE — 73120 X-RAY EXAM OF HAND: CPT | Mod: 50

## 2019-11-12 ENCOUNTER — HOSPITAL ENCOUNTER (OUTPATIENT)
Dept: RADIOLOGY | Facility: CLINIC | Age: 69
Discharge: HOME | End: 2019-11-12
Attending: INTERNAL MEDICINE
Payer: COMMERCIAL

## 2019-11-12 DIAGNOSIS — Z78.0 MENOPAUSE: ICD-10-CM

## 2019-11-12 DIAGNOSIS — M81.0 AGE-RELATED OSTEOPOROSIS WITHOUT CURRENT PATHOLOGICAL FRACTURE: ICD-10-CM

## 2019-11-12 PROCEDURE — 77080 DXA BONE DENSITY AXIAL: CPT

## 2019-11-19 ENCOUNTER — OFFICE VISIT (OUTPATIENT)
Dept: INTERNAL MEDICINE | Facility: CLINIC | Age: 69
End: 2019-11-19
Payer: COMMERCIAL

## 2019-11-19 VITALS
OXYGEN SATURATION: 98 % | HEIGHT: 64 IN | TEMPERATURE: 98.4 F | RESPIRATION RATE: 18 BRPM | BODY MASS INDEX: 29.81 KG/M2 | WEIGHT: 174.6 LBS | SYSTOLIC BLOOD PRESSURE: 156 MMHG | HEART RATE: 68 BPM | DIASTOLIC BLOOD PRESSURE: 92 MMHG

## 2019-11-19 DIAGNOSIS — K25.3 ACUTE GASTRIC ULCER WITHOUT HEMORRHAGE OR PERFORATION: ICD-10-CM

## 2019-11-19 DIAGNOSIS — M25.511 ACUTE PAIN OF RIGHT SHOULDER: ICD-10-CM

## 2019-11-19 DIAGNOSIS — I10 ESSENTIAL HYPERTENSION: ICD-10-CM

## 2019-11-19 DIAGNOSIS — M19.90 CHRONIC OSTEOARTHRITIS: Primary | ICD-10-CM

## 2019-11-19 DIAGNOSIS — Z23 FLU VACCINE NEED: ICD-10-CM

## 2019-11-19 PROCEDURE — 99214 OFFICE O/P EST MOD 30 MIN: CPT | Mod: 25 | Performed by: NURSE PRACTITIONER

## 2019-11-19 PROCEDURE — 90653 IIV ADJUVANT VACCINE IM: CPT | Performed by: INTERNAL MEDICINE

## 2019-11-19 PROCEDURE — G0008 ADMIN INFLUENZA VIRUS VAC: HCPCS | Performed by: INTERNAL MEDICINE

## 2019-11-19 RX ORDER — PAROXETINE HYDROCHLORIDE HEMIHYDRATE 12.5 MG/1
12.5 TABLET, FILM COATED, EXTENDED RELEASE ORAL
Qty: 90 TABLET | Refills: 1 | Status: SHIPPED | OUTPATIENT
Start: 2019-11-19 | End: 2020-05-21

## 2019-11-19 RX ORDER — EZETIMIBE 10 MG/1
10 TABLET ORAL
Qty: 90 TABLET | Refills: 1 | Status: SHIPPED | OUTPATIENT
Start: 2019-11-19 | End: 2020-05-21

## 2019-11-19 RX ORDER — RAMIPRIL 10 MG/1
20 CAPSULE ORAL
Qty: 180 CAPSULE | Refills: 1 | Status: SHIPPED | OUTPATIENT
Start: 2019-11-19 | End: 2019-12-03 | Stop reason: SDUPTHER

## 2019-11-19 RX ORDER — PANTOPRAZOLE SODIUM 40 MG/1
40 TABLET, DELAYED RELEASE ORAL 2 TIMES DAILY
Qty: 180 TABLET | Refills: 1 | Status: SHIPPED | OUTPATIENT
Start: 2019-11-19 | End: 2020-06-24

## 2019-11-19 RX ORDER — METOPROLOL SUCCINATE 100 MG/1
100 TABLET, EXTENDED RELEASE ORAL
Qty: 90 TABLET | Refills: 1 | Status: SHIPPED | OUTPATIENT
Start: 2019-11-19 | End: 2020-05-21

## 2019-11-19 RX ORDER — ROSUVASTATIN CALCIUM 40 MG/1
40 TABLET, COATED ORAL
Qty: 90 TABLET | Refills: 1 | Status: SHIPPED | OUTPATIENT
Start: 2019-11-19 | End: 2020-05-26

## 2019-11-19 RX ORDER — HYDROCHLOROTHIAZIDE 12.5 MG/1
12.5 TABLET ORAL
Qty: 90 TABLET | Refills: 1 | Status: SHIPPED | OUTPATIENT
Start: 2019-11-19 | End: 2019-12-05

## 2019-11-19 ASSESSMENT — ENCOUNTER SYMPTOMS
COUGH: 0
MYALGIAS: 0
TROUBLE SWALLOWING: 0
NAUSEA: 0
BACK PAIN: 0
DIARRHEA: 0
ABDOMINAL PAIN: 0
WHEEZING: 0
EYE PAIN: 0
CHILLS: 0
FEVER: 0
CONSTIPATION: 0
SHORTNESS OF BREATH: 0
PALPITATIONS: 0
SORE THROAT: 0
ARTHRALGIAS: 1
CHEST TIGHTNESS: 0
VOMITING: 0
ABDOMINAL DISTENTION: 0
WOUND: 0
JOINT SWELLING: 1

## 2019-11-19 NOTE — PROGRESS NOTES
"  Subjective     Patient ID: Forrest Mueller is a 69 y.o. female.    Saw Dr Daily at [facility] for her q3year colonoscopy  Had upper endo as well where they found a gastric ulcer  Will be having repeat endo 12/6  She is off all nsaids    BL hands arthritis really bad   Not helped by injections  Previously helped by NSAIDs but cannot take any longer due to gastric ulcer  She wonders if medical marijuana would be helpful - she may pursue treatment when in [place] for the winter      Review of Systems   Constitutional: Negative for chills and fever.   HENT: Negative for ear pain, hearing loss, sore throat and trouble swallowing.    Eyes: Negative for pain and visual disturbance.   Respiratory: Negative for cough, chest tightness, shortness of breath and wheezing.    Cardiovascular: Negative for chest pain, palpitations and leg swelling.   Gastrointestinal: Negative for abdominal distention, abdominal pain, constipation, diarrhea, nausea and vomiting.   Musculoskeletal: Positive for arthralgias and joint swelling (OA changes BL hands ). Negative for back pain, gait problem and myalgias.   Skin: Negative for pallor, rash and wound.       Objective     Vitals:    11/19/19 0802 11/19/19 0837   BP:  (!) 156/92   Pulse: 68    Resp: 18    Temp: 36.9 °C (98.4 °F)    SpO2: 98%    Weight: 79.2 kg (174 lb 9.6 oz)    Height: 1.613 m (5' 3.5\")      Body mass index is 30.44 kg/m².    Physical Exam   Constitutional: She is oriented to person, place, and time. She appears well-developed and well-nourished.   HENT:   Head: Normocephalic and atraumatic.   Right Ear: Tympanic membrane and external ear normal.   Left Ear: Tympanic membrane and external ear normal.   Nose: Nose normal.   Mouth/Throat: Oropharynx is clear and moist. No oropharyngeal exudate.   Eyes: Conjunctivae are normal.   Neck: Normal range of motion. Neck supple.   Cardiovascular: Normal rate, regular rhythm, normal heart sounds and intact distal pulses. " Exam reveals no gallop and no friction rub.   No murmur heard.  Pulmonary/Chest: Effort normal and breath sounds normal. No respiratory distress. She has no wheezes. She has no rales.   Abdominal: Soft. Bowel sounds are normal. She exhibits no distension and no mass. There is no tenderness. There is no guarding.   Musculoskeletal: Normal range of motion.        Right shoulder: She exhibits tenderness (trapezius) and pain. She exhibits normal range of motion, no bony tenderness, no swelling, no effusion, no deformity and no spasm.   Osteoarthritic changes BL hands, worst at DIP joints   Lymphadenopathy:     She has no cervical adenopathy.   Neurological: She is alert and oriented to person, place, and time. No cranial nerve deficit.   Skin: No rash noted. No erythema.       Assessment/Plan   Diagnoses and all orders for this visit:    Chronic osteoarthritis (Primary)    Acute gastric ulcer without hemorrhage or perforation    Essential hypertension    Acute pain of right shoulder  -     X-RAY SHOULDER RIGHT 2+ VIEWS; Future    Flu vaccine need    Other orders  -     ezetimibe (ZETIA) 10 mg tablet; Take 1 tablet (10 mg total) by mouth once daily.  -     hydrochlorothiazide (HYDRODIURIL) 12.5 mg tablet; Take 1 tablet (12.5 mg total) by mouth once daily.  -     metoprolol succinate XL (TOPROL-XL) 100 mg 24 hr tablet; Take 1 tablet (100 mg total) by mouth once daily.  -     pantoprazole (PROTONIX) 40 mg EC tablet; Take 1 tablet (40 mg total) by mouth 2 (two) times a day.  -     PARoxetine CR (PAXIL-CR) 12.5 mg 24 hr tablet; Take 1 tablet (12.5 mg total) by mouth once daily.  -     ramipril (ALTACE) 10 mg capsule; Take 2 capsules (20 mg total) by mouth once daily.  -     rosuvastatin (CRESTOR) 40 mg tablet; Take 1 tablet (40 mg total) by mouth once daily.  -     Influenza vaccine 65 and older IM preservative free (FluAd)      1. Chronic osteoarthritis  PT is suffering with OA pain since being told she had to stop NSAIDs  due to gastric ulcer. Tylenol is minimally helpful.  She has had multiple injections by Dr Hernandez.  Once she goes to [place] for the winter she is considering pursuing medical marijuana treatment.  She wonders if she can try celebrex once her gastric ulcer has healed.    2. Acute gastric ulcer without hemorrhage or perforation  Continue protonix BID.    3. Essential hypertension  BP elevated in office today - it usually is with better control.  She will stop by the office in the next week for a BP check.  If her BP remains elevated we will change her medications.    4. Acute pain of right shoulder  Check xray  - X-RAY SHOULDER RIGHT 2+ VIEWS; Future    5. Flu vaccine need  Flu shot given today    NELSON Romo

## 2019-12-05 ENCOUNTER — CLINICAL SUPPORT (OUTPATIENT)
Dept: INTERNAL MEDICINE | Facility: CLINIC | Age: 69
End: 2019-12-05
Payer: COMMERCIAL

## 2019-12-05 VITALS — OXYGEN SATURATION: 98 % | DIASTOLIC BLOOD PRESSURE: 80 MMHG | HEART RATE: 73 BPM | SYSTOLIC BLOOD PRESSURE: 132 MMHG

## 2019-12-05 DIAGNOSIS — M79.641 BILATERAL HAND PAIN: ICD-10-CM

## 2019-12-05 DIAGNOSIS — E87.6 HYPOKALEMIA: Primary | ICD-10-CM

## 2019-12-05 DIAGNOSIS — M19.90 OSTEOARTHRITIS, UNSPECIFIED OSTEOARTHRITIS TYPE, UNSPECIFIED SITE: Primary | ICD-10-CM

## 2019-12-05 DIAGNOSIS — I10 ESSENTIAL HYPERTENSION: ICD-10-CM

## 2019-12-05 DIAGNOSIS — M79.642 BILATERAL HAND PAIN: ICD-10-CM

## 2019-12-05 PROCEDURE — 99999 PR OFFICE/OUTPT VISIT,PROCEDURE ONLY: CPT | Performed by: INTERNAL MEDICINE

## 2019-12-05 RX ORDER — HYDROCORTISONE ACETATE 25 MG/1
25 SUPPOSITORY RECTAL 2 TIMES DAILY
Qty: 20 SUPPOSITORY | Refills: 1 | Status: CANCELLED | OUTPATIENT
Start: 2019-12-05 | End: 2019-12-15

## 2019-12-05 RX ORDER — HYDROCHLOROTHIAZIDE 25 MG/1
25 TABLET ORAL
Qty: 90 TABLET | Refills: 1
Start: 2019-12-05 | End: 2020-10-30

## 2019-12-05 RX ORDER — HYDROCORTISONE ACETATE 25 MG/1
25 SUPPOSITORY RECTAL 2 TIMES DAILY
Qty: 20 SUPPOSITORY | Refills: 1 | Status: SHIPPED | OUTPATIENT
Start: 2019-12-05 | End: 2020-10-01 | Stop reason: ALTCHOICE

## 2019-12-05 NOTE — TELEPHONE ENCOUNTER
Pt was in office for bp check, she said she increased her bp med, shoulder elevated by pt, pt would like referral request to pain clinic and finally Anusol suppository to be  sent to the pharmacy

## 2019-12-17 ENCOUNTER — APPOINTMENT (OUTPATIENT)
Dept: LAB | Facility: CLINIC | Age: 69
End: 2019-12-17
Attending: NURSE PRACTITIONER
Payer: COMMERCIAL

## 2019-12-17 DIAGNOSIS — E87.6 HYPOKALEMIA: ICD-10-CM

## 2019-12-17 LAB
ANION GAP SERPL CALC-SCNC: 8 MEQ/L (ref 3–15)
BUN SERPL-MCNC: 23 MG/DL (ref 8–20)
CALCIUM SERPL-MCNC: 10 MG/DL (ref 8.9–10.3)
CHLORIDE SERPL-SCNC: 107 MEQ/L (ref 98–109)
CO2 SERPL-SCNC: 27 MEQ/L (ref 22–32)
CREAT SERPL-MCNC: 0.9 MG/DL
GFR SERPL CREATININE-BSD FRML MDRD: >60 ML/MIN/1.73M*2
GLUCOSE SERPL-MCNC: 98 MG/DL (ref 70–99)
POTASSIUM SERPL-SCNC: 4.4 MEQ/L (ref 3.6–5.1)
SODIUM SERPL-SCNC: 142 MEQ/L (ref 136–144)

## 2019-12-17 PROCEDURE — 80048 BASIC METABOLIC PNL TOTAL CA: CPT

## 2019-12-17 PROCEDURE — 36415 COLL VENOUS BLD VENIPUNCTURE: CPT

## 2020-04-20 ENCOUNTER — TELEMEDICINE (OUTPATIENT)
Dept: INTERNAL MEDICINE | Facility: CLINIC | Age: 70
End: 2020-04-20
Payer: COMMERCIAL

## 2020-04-20 ENCOUNTER — TELEPHONE (OUTPATIENT)
Dept: INTERNAL MEDICINE | Facility: CLINIC | Age: 70
End: 2020-04-20

## 2020-04-20 DIAGNOSIS — E78.5 HYPERLIPIDEMIA, UNSPECIFIED HYPERLIPIDEMIA TYPE: Primary | ICD-10-CM

## 2020-04-20 DIAGNOSIS — Z13.220 SCREENING, LIPID: ICD-10-CM

## 2020-04-20 DIAGNOSIS — E04.9 GOITER: ICD-10-CM

## 2020-04-20 DIAGNOSIS — R73.9 HYPERGLYCEMIA: ICD-10-CM

## 2020-04-20 DIAGNOSIS — I67.9 CEREBRAL VASCULAR DISEASE: ICD-10-CM

## 2020-04-20 DIAGNOSIS — Z13.0 SCREENING, ANEMIA, DEFICIENCY, IRON: ICD-10-CM

## 2020-04-20 DIAGNOSIS — R19.5 LOOSE STOOLS: ICD-10-CM

## 2020-04-20 DIAGNOSIS — I10 ESSENTIAL HYPERTENSION: ICD-10-CM

## 2020-04-20 DIAGNOSIS — K27.9 PEPTIC ULCER DISEASE: ICD-10-CM

## 2020-04-20 DIAGNOSIS — E55.9 VITAMIN D DEFICIENCY: ICD-10-CM

## 2020-04-20 DIAGNOSIS — I25.10 CHRONIC CORONARY ARTERY DISEASE: ICD-10-CM

## 2020-04-20 DIAGNOSIS — Z12.39 SCREENING FOR BREAST CANCER: ICD-10-CM

## 2020-04-20 DIAGNOSIS — Z13.89 SCREENING FOR HEMATURIA OR PROTEINURIA: ICD-10-CM

## 2020-04-20 PROCEDURE — 99214 OFFICE O/P EST MOD 30 MIN: CPT | Mod: 95 | Performed by: INTERNAL MEDICINE

## 2020-04-20 ASSESSMENT — ENCOUNTER SYMPTOMS
ACTIVITY CHANGE: 0
CHILLS: 0
VOMITING: 0
BLOOD IN STOOL: 0
UNEXPECTED WEIGHT CHANGE: 0
DECREASED CONCENTRATION: 0
COUGH: 0
MYALGIAS: 0
ABDOMINAL DISTENTION: 0
NERVOUS/ANXIOUS: 0
BACK PAIN: 0
SHORTNESS OF BREATH: 0
SORE THROAT: 0
HEADACHES: 0
ABDOMINAL PAIN: 0
FATIGUE: 0
RHINORRHEA: 0
DIZZINESS: 0
DIARRHEA: 1
ARTHRALGIAS: 1
CHEST TIGHTNESS: 0
DYSURIA: 0
BRUISES/BLEEDS EASILY: 0
WHEEZING: 0
APPETITE CHANGE: 0
PALPITATIONS: 0
SLEEP DISTURBANCE: 0
FEVER: 0
HEMATURIA: 0
JOINT SWELLING: 0
DYSPHORIC MOOD: 0
NAUSEA: 0
CONSTIPATION: 0
FREQUENCY: 0
DIAPHORESIS: 0

## 2020-04-20 NOTE — TELEPHONE ENCOUNTER
ALFRED Estrada W in June.    Haily, annual labs and mammogram.  Please add an A1c and connected to hyperglycemia.  She will also need an ultrasound of the thyroid for nodular goiter.    Thank you  :)

## 2020-04-20 NOTE — PROGRESS NOTES
Request for Consent:   Video Encounter   Yvonne, my name is Sierra Montanez DO.  Before we proceed, can you please verify your identification by telling me your full name and date of birth?  Can you tell me who is in the room with you?    You and I are about to have a telemedicine check-in or visit because you have requested it.  This is a live video-conference.  I am a real person, speaking to you in real time.  There is no one else with me on the video-conference.  However, when we use ([employer], [employer], etc) it is important for you to know that the video-conference may not be secure or private.  I am not recording this conversation and I am asking you not to record it.  This telemedicine visit will be billed to your health insurance or you, if you are self-insured.  You understand you will be responsible for any copayments or coinsurances that apply to your telemedicine visit.  Before starting our telemedicine visit, I am required to get your consent for this virtual check-in or visit by telemedicine. Do you consent?    Patient Response to Request for Consent:  Yes      Visit Documentation:  Subjective      Follow up chronic     Patient ID: Forrest Mueller is a 69 y.o. female.  1950      HPI     Essential Hypertension.   Monitor response to therapy.    Hyperlipidemia   Monitor response to therapy        The following have been reviewed and updated as appropriate in this visit:  Tobacco  Allergies  Meds  Med Hx  Surg Hx  Fam Hx  Soc Hx      Review of Systems   Constitutional: Negative for activity change, appetite change, chills, diaphoresis, fatigue, fever and unexpected weight change.   HENT: Negative for congestion, ear pain, postnasal drip, rhinorrhea and sore throat.    Eyes: Negative for visual disturbance.   Respiratory: Negative for cough, chest tightness, shortness of breath and wheezing.    Cardiovascular: Negative for chest pain, palpitations and leg swelling.   Gastrointestinal:  Positive for diarrhea. Negative for abdominal distention, abdominal pain, blood in stool, constipation, nausea and vomiting.        Loose stool unchanged  Is using fiber  Urgency is concerning.    Recent ulcer.   Taking protonix with naproxen.    Genitourinary: Negative for dysuria, frequency, hematuria and urgency.        Nocturia x 1 at times.    Musculoskeletal: Positive for arthralgias. Negative for back pain, joint swelling and myalgias.        Hands, 4th left hand, 3rd right hand are painful  Has had xrays. Had been seeing Dr Hernandez  Has seen someone in [place] and has had injection.   Injections in [place] at an anesthesia practice.   Pt is taking naroxen daily. (Pt cleared w Dr Daily)   Skin: Negative for rash.   Neurological: Negative for dizziness and headaches.   Hematological: Does not bruise/bleed easily.   Psychiatric/Behavioral: Negative for decreased concentration, dysphoric mood and sleep disturbance. The patient is not nervous/anxious.         PHQ2 screen     Sleeping is fair.   Some days waking up.         Virtual examination:   /76, taken by patient this morning.  No acute distress.  Alert and oriented.  No tachypnea.          Assessment/Plan   1. Hyperlipidemia, unspecified hyperlipidemia type  Patient's last LDL at goal.  Continue current dose of statin with Zetia.  Labs due now.    2. Goiter  Continue to monitor TSH and ultrasound at appropriate intervals.    3. Essential hypertension  Good control on current regimen.  Patient had a blood pressure for me today.  Continue good primary prevention with use of hydrochlorothiazide, ACE inhibitor, and beta blockade.    4. Cerebral vascular disease  LDL at goal.    5. Chronic coronary artery disease  Continue to aggressively modify risk factors.  Most recent LDL at 84.    6. Hyperglycemia  Patient's blood sugars have been stable.  Last hemoglobin A1c at 6.1.    7. Loose stools  Patient has had extensive work-up by Dr. Daily.  She is on Protonix for  a recent ulcer.  She now uses it as needed.  I asked her to see if the Protonix contributes to the loose stools.  She will let me know.    8.  Peptic ulcer disease  With patient's hand issues, she is using the Naprosyn only once a day, cleared by Dr. Daily.  And she does not always use it every day.  On the days that she does, she does take the Protonix.  Again, this could be contributing to the loose stool.    Encounter 30 minutes.  Greater than 50% spent reviewing data, evaluating patient, counseling and coordinating care.

## 2020-04-21 NOTE — TELEPHONE ENCOUNTER
Spoke to pt  ROSELINE  6/30/20   Mailed labs/radiology  to  [address] [place] [id number]

## 2020-05-19 ENCOUNTER — TELEPHONE (OUTPATIENT)
Dept: INTERNAL MEDICINE | Facility: CLINIC | Age: 70
End: 2020-05-19

## 2020-05-21 RX ORDER — PAROXETINE HYDROCHLORIDE HEMIHYDRATE 12.5 MG/1
TABLET, FILM COATED, EXTENDED RELEASE ORAL
Qty: 90 TABLET | Refills: 3 | Status: SHIPPED | OUTPATIENT
Start: 2020-05-21 | End: 2021-05-12

## 2020-05-21 RX ORDER — HYDROCHLOROTHIAZIDE 25 MG/1
25 TABLET ORAL
Qty: 90 TABLET | Refills: 1 | Status: SHIPPED | OUTPATIENT
Start: 2020-05-21 | End: 2020-10-01 | Stop reason: SDUPTHER

## 2020-05-21 RX ORDER — EZETIMIBE 10 MG/1
TABLET ORAL
Qty: 90 TABLET | Refills: 3 | Status: SHIPPED | OUTPATIENT
Start: 2020-05-21 | End: 2021-05-12

## 2020-05-21 RX ORDER — METOPROLOL SUCCINATE 100 MG/1
TABLET, EXTENDED RELEASE ORAL
Qty: 90 TABLET | Refills: 3 | Status: SHIPPED | OUTPATIENT
Start: 2020-05-21 | End: 2021-05-12

## 2020-06-24 RX ORDER — PANTOPRAZOLE SODIUM 40 MG/1
TABLET, DELAYED RELEASE ORAL
Qty: 180 TABLET | Refills: 3 | Status: SHIPPED | OUTPATIENT
Start: 2020-06-24 | End: 2021-08-10 | Stop reason: ALTCHOICE

## 2020-06-30 ENCOUNTER — OFFICE VISIT (OUTPATIENT)
Dept: INTERNAL MEDICINE | Facility: CLINIC | Age: 70
End: 2020-06-30
Payer: COMMERCIAL

## 2020-06-30 VITALS
HEIGHT: 64 IN | DIASTOLIC BLOOD PRESSURE: 76 MMHG | TEMPERATURE: 96 F | WEIGHT: 172.2 LBS | OXYGEN SATURATION: 97 % | BODY MASS INDEX: 29.4 KG/M2 | SYSTOLIC BLOOD PRESSURE: 122 MMHG | HEART RATE: 60 BPM

## 2020-06-30 DIAGNOSIS — I10 ESSENTIAL HYPERTENSION: ICD-10-CM

## 2020-06-30 DIAGNOSIS — N64.4 BREAST PAIN, LEFT: ICD-10-CM

## 2020-06-30 DIAGNOSIS — M19.041 OSTEOARTHRITIS OF BOTH HANDS, UNSPECIFIED OSTEOARTHRITIS TYPE: ICD-10-CM

## 2020-06-30 DIAGNOSIS — F32.5 MAJOR DEPRESSIVE DISORDER WITH SINGLE EPISODE, IN FULL REMISSION (CMS/HCC): ICD-10-CM

## 2020-06-30 DIAGNOSIS — I25.10 CHRONIC CORONARY ARTERY DISEASE: ICD-10-CM

## 2020-06-30 DIAGNOSIS — Z00.00 MEDICARE ANNUAL WELLNESS VISIT, SUBSEQUENT: Primary | ICD-10-CM

## 2020-06-30 DIAGNOSIS — E78.5 HYPERLIPIDEMIA, UNSPECIFIED HYPERLIPIDEMIA TYPE: ICD-10-CM

## 2020-06-30 DIAGNOSIS — M19.042 OSTEOARTHRITIS OF BOTH HANDS, UNSPECIFIED OSTEOARTHRITIS TYPE: ICD-10-CM

## 2020-06-30 DIAGNOSIS — K27.9 PEPTIC ULCER DISEASE: ICD-10-CM

## 2020-06-30 PROCEDURE — G0439 PPPS, SUBSEQ VISIT: HCPCS | Performed by: INTERNAL MEDICINE

## 2020-06-30 PROCEDURE — 99213 OFFICE O/P EST LOW 20 MIN: CPT | Mod: 25 | Performed by: INTERNAL MEDICINE

## 2020-06-30 RX ORDER — CELECOXIB 100 MG/1
100 CAPSULE ORAL 2 TIMES DAILY
Qty: 180 CAPSULE | Refills: 1 | Status: SHIPPED | OUTPATIENT
Start: 2020-06-30 | End: 2020-10-01 | Stop reason: SDUPTHER

## 2020-06-30 RX ORDER — CELECOXIB 100 MG/1
100 CAPSULE ORAL 2 TIMES DAILY
COMMUNITY
End: 2020-12-09

## 2020-06-30 RX ORDER — CELECOXIB 100 MG/1
100 CAPSULE ORAL 2 TIMES DAILY
Qty: 60 CAPSULE | Refills: 0 | Status: CANCELLED | OUTPATIENT
Start: 2020-06-30 | End: 2020-07-30

## 2020-06-30 ASSESSMENT — ENCOUNTER SYMPTOMS
APPETITE CHANGE: 0
COUGH: 0
MYALGIAS: 0
NERVOUS/ANXIOUS: 0
FREQUENCY: 0
BACK PAIN: 0
ABDOMINAL PAIN: 0
ACTIVITY CHANGE: 0
SHORTNESS OF BREATH: 0
JOINT SWELLING: 0
WHEEZING: 0
HEADACHES: 0
FATIGUE: 0
DIAPHORESIS: 0
FEVER: 0
CHILLS: 0
SORE THROAT: 0
DYSPHORIC MOOD: 0
DIARRHEA: 0
BRUISES/BLEEDS EASILY: 0
PALPITATIONS: 0
DIZZINESS: 0
HEMATURIA: 0
ABDOMINAL DISTENTION: 0
DECREASED CONCENTRATION: 0
CONSTIPATION: 1
ARTHRALGIAS: 0
UNEXPECTED WEIGHT CHANGE: 0
RHINORRHEA: 0
CHEST TIGHTNESS: 0
NAUSEA: 0
DYSURIA: 0
BLOOD IN STOOL: 0

## 2020-06-30 ASSESSMENT — MINI COG
TOTAL SCORE: 5
COMPLETED: YES

## 2020-06-30 NOTE — PATIENT INSTRUCTIONS
Your Personalized Prevention Plan Services (PPPS)    Preventive Services Checklist (Assumes Average Risk Unless Otherwise Noted):    Health Maintenance Topics with due status: Overdue       Topic Date Due    Annual Dilated Retinal Exam 08/23/1961    Diabetic Foot Exam 08/23/1961    Varicella Vaccines 07/24/2012    Zoster Vaccine 08/21/2012    Medicare Annual Wellness Visit 05/14/2020    Hemoglobin A1C 05/20/2020    Urine Protein Screening 05/20/2020     Health Maintenance Topics with due status: Not Due       Topic Last Completion Date    DTaP, Tdap, and Td Vaccines 09/23/2015    Mammogram 06/10/2019    Colonoscopy 07/23/2019    DEXA Scan 11/12/2019    Annual Falls Risk Screening 06/30/2020     Health Maintenance Topics with due status: Completed       Topic Last Completion Date    Pneumococcal (65 years and older) 11/28/2017    Hepatitis C Screening 07/17/2018    Influenza Vaccine 11/19/2019     Health Maintenance Topics with due status: Aged Out       Topic Date Due    Meningococcal ACWY Aged Out    HIB Vaccines Aged Out    IPV Vaccines Aged Out    HPV Vaccines Aged Out    Pneumococcal Aged Out       You May Be Eligible for These Additional Preventive Services   (Assumes Average Risk Unless Otherwise Noted)  Diabetes Screening Any 1 risk factor: hypertension, dyslipidemia, obesity, high glucose; or Any 2 risk factors: >=64yo, overweight, family history diabetes (covered every 6 months)   Hepatitis C Screening Any 1 risk factor: 1) blood transfusion before 1992,   2) current or past injection drug use (annually for high risk; if born between 3375-8834, see above for status).   Vaccine: Hepatitis B As necessary if at-risk: hemophilia, ESRD, diabetes, living with individual infected with hep B, healthcare worker with frequent contact with blood/bodily fluids (series covered once)   Sexually Transmitted Diseases (STDs) As necessary chlamydia, gonorrhea, syphilis, hepatitis B (covered  annually)  HIV if any 1 risk factor present: 1) <16yo or >64yo and at increased risk or 2) 15-64yo and ask for it (covered annually)   Lung Cancer Screening Low dose chest CT if all 3 risk factors: 1) 55-78yo, 2) smoker or quit within last 15y, 3) >=30 pack years (covered annually).  No results found for this or any previous visit.     Cholesterol Screening Both risk factors: 1) >=21yo and 2)  increased risk coronary artery disease (covered every 5 years).     Breast Cancer Screening Covered once 35-40yo, annually >=39yo (if >=51yo, see above for status).     Glaucoma Screening Any 1 risk factor: 1) diabetes, 2) family history glaucoma, 3)  >=51yo, 4)  American >=64yo (covered annually)           Health Risk Factors with Personalized Education:  ----------------------------------------------------------------------------------------------------------------------  Stress management:  Understanding Your Stress  · Think about how you know when you’re stressed.  · Think about how your thoughts or behaviors are different when you’re stressed.  · Think about what triggers stress for you.  · Think about how you handle stress, and whether you’re making unhealthy choices in response to stress (like smoking, drinking alcohol or overeating).  Managing Stress  · Take a break from the stressful situation.  · Reduce your stress levels by exercising, talking with family/friends, ensuring adequate sleep.  · Consider meditation or yoga.  · Make sure you plan time to do things you enjoy.  · Let your PCP know if you’re having problems limiting your stress.  ----------------------------------------------------------------------------------------------------------------------  Controlling Your Blood Pressure  · Maintain a normal weight (body mass index between 18.5 and 24.9).  · Eat more fruit, vegetables and low-fat dairy.  · Eat less saturated fat and total fat.  · Lower your sodium (salt) intake.  Try to stay  under 1500 mg per day, but if you cannot get your intake to be that low, at least lower it by 1000 mg.  · Stay active.  Try to get at least 90 to 150 minutes of exercise per week.  Try brisk walking, swimming, bicycling or dancing.  · Limit alcohol intake.  When you do consume alcohol, drink no more than 1 drink per day.  · If you have been prescribed medication, take it regularly and exactly as prescribed.  Let your PCP know if you have any problems or questions about your medication.  · Check your blood pressure at home or at the store.  Write down your readings and share them with your PCP  ----------------------------------------------------------------------------------------------------------------------  Controlling Your Diabetes  · Stress can raise your blood sugar.  Learn what causes your stress.  Learn to lower your stress by spending time with family and friends, exercising, deep breathing, trying meditation or yoga, enjoying hobbies and getting enough rest.  Let your PCP know if you’re having problems limiting your stress.  · Maintain a healthy weight by balancing your diet and exercise.  · Choose foods that are lower in calories, saturated fat, trans fat, sugar and salt.  Eat foods with more fiber, like whole grain cereals, bread, crackers, rice or pasta.  Choose to eat fruit, vegetables, and low-fat or fat-free/skim dairy.  · Reduce the portion size of your meals.  Make half of your meal vegetables and fruit, and divide the other half between lean protein and whole grains.  · Drink water instead of juice and regular soda.  · Spend at least some time being active on most days of the week.  · Work to increase your muscle strength at least twice per week.  Try things like light weights, stretch bands, yoga, heavy gardening (digging, planting with tools) or push-ups.  · If you have been prescribed medication, take it regularly and exactly as prescribed.  Let your PCP know if you have any problems or  questions about your medication.  · If you have been asked to check your blood sugar, write down your readings and share them with your PCP  ----------------------------------------------------------------------------------------------------------------------  Controlling Your Cholesterol  · Reduce the amount of saturated and trans fat in your diet.  Limit intake of red meat.  Consume only low-fat or non-fat/skim dairy.  Limit fried food.  Cook with vegetable oils.  · Reduce your intake of sugary foods, sugary drinks and alcohol.  · Eat a diet high in fruit, vegetables and whole grains.  · Get protein from fish, poultry and a small portion of nuts.  · Stay active.  Try to get at least 90 to 150 minutes of exercise per week.  Try brisk walking, swimming, bicycling or dancing.  · Maintain a healthy weight by balancing your diet and exercise.  · If you have been prescribed medication, take it regularly and exactly as prescribed. Let your PCP know if you have any problems or questions about your medication.  · It’s important to know your cholesterol numbers.  When recommended by your PCP, get the cholesterol blood test.  ---------------------------------------------------------------------------------------------------------------------   Controlling Your Osteoporosis, Strengthening Your Bones  · Try to get at least 90 to 150 minutes of weight-bearing exercise per week.  · Ensure intake of at least 1200mg of calcium per day.  Eat foods high in calcium like milk and other dairy, green vegetables, fruit, canned fish with soft and edible bones, nuts, calcium-set tofu.  Some foods are calcium-fortified, like bread, cereal, fruit juices and mineral water.  · Help your body make vitamin D by getting 10-15 minutes per day of sunlight.    · Ensure intake of at least 600IU of vitamin D per day.  Eat foods high in vitamin D like oily fish (salmon, sardines, mackerel) and eggs.  Some foods are fortified with vitamin D, like dairy  and cereals.  · Avoid high amounts of caffeine and salt, since they can cause the body to loose calcium.  · Limit alcohol intake, since it is associated with weaker bones and is associated with falls and fractures.  · Limit intake of fizzy drinks.  · If you have been prescribed medication, take it regularly and exactly as prescribed.  Let your PCP know if you have any problems or questions about your medication  ----------------------------------------------------------------------------------------------------------------------  Controlling Your Osteopenia, Strengthening Your Bones  · Try to get at least 90 to 150 minutes of weight-bearing exercise per week.  · Ensure intake of at least 1200mg of calcium per day.  Eat foods high in calcium like milk and other dairy, green vegetables, fruit, canned fish with soft and edible bones, nuts, calcium-set tofu.  Some foods are calcium-fortified, like bread, cereal, fruit juices and mineral water.  · Help your body make vitamin D by getting 10-15 minutes per day of sunlight.    · Ensure intake of at least 600IU of vitamin D per day.  Eat foods high in vitamin D like oily fish (salmon, sardines, mackerel) and eggs.  Some foods are fortified with vitamin D, like dairy and cereals.  · Avoid high amounts of caffeine and salt, since they can cause the body to loose calcium.  · Limit alcohol intake, since it is associated with weaker bones and is associated with falls and fractures.  · Limit intake of fizzy drinks.  · If you have been prescribed medication, take it regularly and exactly as prescribed.  Let your PCP know if you have any problems or questions about your medication  ----------------------------------------------------------------------------------------------------------------------  Reducing Your Risk of Falls  · Tell your PCP if any of your medications make you feel tired, dizzy, lightheaded or off-balance.  · Maintain coordination, flexibility and balance by  ensuring regular physical activity.  · Limit alcohol intake to 1 drink per day.  Consider avoiding all alcohol intake.  · Ensure good vision.  Visit an ophthalmologist or optometrist regularly for vision screening or to make sure your glasses / contact lens prescription is correct.  If you need glasses or contacts, wear them.  When you get new glasses or contacts, take time to get used to them.  Do not wear sunglasses or tinted lenses when indoors.  · Ensure good hearing.  Have your hearing checked if you are having trouble hearing, or family and friends think you cannot hear them.  If you need a hearing aid, be sure it fits well and wear it.  · Get enough rest.  Ensure about 7-9 hours of sleep every day.  · Get up slowly from your bed or chairs.  Do not start walking until you are sure you feel steady.  · Wear non-skid, rubber-soled, low-heeled shoes.  Do not walk in socks, or in shoes and slippers with smooth soles.  · If your PCP or therapist recommends using a cane or walker, use it regularly.  · Make your home safer.  Increase lighting throughout the house, especially at the top and bottom of stairs.  Ensure lighting is easily turned on when getting up in the middle of the night.  Make sure there are two secure rails on all stairs.  Install grab bars in the bathtub / shower and near the toilet.  Consider using a shower chair and / or a hand-held shower.  · Spread sand or salt on icy surfaces.  Beware of wet surfaces, which can be icy.  · Tell your PCP if you have fallen.

## 2020-06-30 NOTE — PROGRESS NOTES
Subjective    MAW    Follow up chronic conditons     Patient ID: Forrest Mueller is a 69 y.o. female.    HPI  Patient presents for medicare annual wellness examination. Patient complains of pain in left breast.     Essential Hypertension.   Monitor response to therapy.    Coronary artery disease   Monitor response to therapy     Hyperlipidemia   Monitor response to therapy    The following have been reviewed and updated as appropriate in this visit:  Tobacco  Med Hx  Surg Hx  Fam Hx  Soc Hx        Allergies   Allergen Reactions   • No Known Allergies        Current Outpatient Medications   Medication Sig Dispense Refill   • celecoxib (celeBREX) 100 mg capsule Take 100 mg by mouth 2 (two) times a day.     • ezetimibe (ZETIA) 10 mg tablet TAKE 1 TABLET DAILY 90 tablet 3   • hydrochlorothiazide (HYDRODIURIL) 25 mg tablet Take 1 tablet (25 mg total) by mouth once daily. 90 tablet 1   • hydrochlorothiazide (HYDRODIURIL) 25 mg tablet Take 1 tablet (25 mg total) by mouth once daily. 90 tablet 1   • hydrocortisone (ANUSOL-HC) 25 mg suppository Insert 1 suppository (25 mg total) into the rectum 2 (two) times a day for 10 days. 20 suppository 1   • metoprolol succinate XL (TOPROL-XL) 100 mg 24 hr tablet TAKE 1 TABLET DAILY 90 tablet 3   • pantoprazole (PROTONIX) 40 mg EC tablet TAKE 1 TABLET TWICE A  tablet 3   • PARoxetine CR (PAXIL-CR) 12.5 mg 24 hr tablet TAKE 1 TABLET DAILY 90 tablet 3   • ramipriL (ALTACE) 10 mg capsule TAKE 2 CAPSULES ONCE DAILY 180 capsule 1   • rosuvastatin (CRESTOR) 40 mg tablet TAKE 1 TABLET DAILY 90 tablet 2   • celecoxib (celeBREX) 100 mg capsule Take 1 capsule (100 mg total) by mouth 2 (two) times a day. 180 capsule 1     No current facility-administered medications for this visit.        Past Medical History:   Diagnosis Date   • Basal cell carcinoma    • Depression    • Diverticulitis of colon    • Endometrioma    • Hypertension    • Lipid disorder    • Osteoarthritis    •  Thyroid nodule      Family History   Problem Relation Age of Onset   • Breast cancer Biological Mother 80   • Hypertension Biological Mother    • Stroke Biological Mother    • Colon cancer Biological Father    • Hypertension Biological Father    • Stroke Paternal Grandmother    • Colon cancer Father's Brother    • Colon cancer Cousin    • Stroke Father's Sister      Social History     Socioeconomic History   • Marital status:      Spouse name: Not on file   • Number of children: Not on file   • Years of education: Not on file   • Highest education level: Not on file   Occupational History   • Not on file   Social Needs   • Financial resource strain: Not on file   • Food insecurity:     Worry: Not on file     Inability: Not on file   • Transportation needs:     Medical: Not on file     Non-medical: Not on file   Tobacco Use   • Smoking status: Never Smoker   • Smokeless tobacco: Never Used   Substance and Sexual Activity   • Alcohol use: Not on file   • Drug use: Not on file   • Sexual activity: Not on file   Lifestyle   • Physical activity:     Days per week: Not on file     Minutes per session: Not on file   • Stress: Not on file   Relationships   • Social connections:     Talks on phone: Not on file     Gets together: Not on file     Attends Gnosticism service: Not on file     Active member of club or organization: Not on file     Attends meetings of clubs or organizations: Not on file     Relationship status: Not on file   • Intimate partner violence:     Fear of current or ex partner: Not on file     Emotionally abused: Not on file     Physically abused: Not on file     Forced sexual activity: Not on file   Other Topics Concern   • Not on file   Social History Narrative   • Not on file       Review of Systems   Constitutional: Negative for activity change, appetite change, chills, diaphoresis, fatigue, fever and unexpected weight change.   HENT: Negative for congestion, ear pain, postnasal drip, rhinorrhea  "and sore throat.    Eyes: Negative for visual disturbance.   Respiratory: Negative for cough, chest tightness, shortness of breath and wheezing.    Cardiovascular: Negative for chest pain, palpitations and leg swelling.   Gastrointestinal: Positive for constipation (helped with fiber). Negative for abdominal distention, abdominal pain, blood in stool, diarrhea and nausea.   Genitourinary: Negative for dysuria, frequency, hematuria and urgency.   Musculoskeletal: Negative for arthralgias, back pain, joint swelling and myalgias.   Skin: Negative for rash.   Neurological: Negative for dizziness and headaches.   Hematological: Does not bruise/bleed easily.   Psychiatric/Behavioral: Negative for decreased concentration and dysphoric mood. The patient is not nervous/anxious.        Objective     Visit Vitals  /76 (BP Location: Left upper arm, Patient Position: Sitting)   Pulse 60   Temp (!) 35.6 °C (96 °F) (Temporal)   Ht 1.613 m (5' 3.5\")   Wt 78.1 kg (172 lb 3.2 oz)   LMP  (LMP Unknown)   SpO2 97%   BMI 30.03 kg/m²     BP Readings from Last 3 Encounters:   06/30/20 122/76   12/05/19 132/80   11/19/19 (!) 156/92     Wt Readings from Last 3 Encounters:   06/30/20 78.1 kg (172 lb 3.2 oz)   11/19/19 79.2 kg (174 lb 9.6 oz)   08/27/19 78.7 kg (173 lb 9.6 oz)       Physical Exam   Constitutional: She is oriented to person, place, and time. She appears well-developed and well-nourished. No distress.   HENT:   Head: Normocephalic and atraumatic.   Right Ear: External ear normal.   Left Ear: External ear normal.   Mouth/Throat: Oropharynx is clear and moist. No oropharyngeal exudate.   Eyes: Pupils are equal, round, and reactive to light.   Neck: No JVD present. No tracheal deviation present. No thyromegaly present.   Cardiovascular: Normal rate, regular rhythm, normal heart sounds and intact distal pulses.   No murmur heard.  Pulmonary/Chest: Effort normal and breath sounds normal. No stridor. No respiratory distress. She " has no wheezes. She has no rales.    Tenderness of left breast when supine, 10 cm from the nipple    Abdominal: Soft. Bowel sounds are normal. She exhibits no distension and no mass. There is no tenderness. There is no guarding.   Musculoskeletal: Normal range of motion. She exhibits no edema.   Lymphadenopathy:     She has no cervical adenopathy.   Neurological: She is alert and oriented to person, place, and time.   Skin: Skin is warm and dry.   Psychiatric: She has a normal mood and affect. Her behavior is normal.   Nursing note and vitals reviewed.      Lab Results   Component Value Date    WBC 8.25 05/20/2019    HGB 12.6 05/20/2019    HCT 40.2 05/20/2019    MCV 89.9 05/20/2019     05/20/2019     Lab Results   Component Value Date    GLUCOSE 98 12/17/2019    CALCIUM 10.0 12/17/2019     12/17/2019    K 4.4 12/17/2019    CO2 27 12/17/2019     12/17/2019    BUN 23 (H) 12/17/2019    CREATININE 0.9 12/17/2019         Assessment/Plan   Problem List Items Addressed This Visit        Circulatory    Chronic coronary artery disease    Essential hypertension       Musculoskeletal    Osteoarthritis       Endocrine/Metabolic    Goiter    Hyperlipidemia      Other Visit Diagnoses     Medicare annual wellness visit, subsequent    -  Primary    Breast pain, left        Relevant Orders    BI DIAGNOSTIC MAMMOGRAM BILATERAL    Major depressive disorder with single episode, in full remission (CMS/[facility])            By signing my name below, I, Marcia Alejo, attest that this documentation has been prepared under the direction and in the presence of Sierra Montanez DO  6/30/2020 9:06 AM  Sierra Montanez DO     I, Sierra Montanez, personally performed the services described in this documentation, as documented by the scribe in my presence, and it is both accurate and complete.  6/30/2020 9:07 AM    6/30/2020  Subjective     Forrest Mueller is a 69 y.o. female who presents for a subsequent annual  wellness visit.     Patient Care Team:  Sierra Montanez DO as PCP - General    Comprehensive Medical and Social History  Patient Active Problem List   Diagnosis   • Blood glucose abnormal   • Chronic coronary artery disease   • Basal cell carcinoma   • Chicken pox   • Cerebral vascular disease   • Diverticulosis   • Endometriosis   • Family history of malignant neoplasm of breast   • Family history of cardiovascular disease   • Goiter   • Hemorrhoids   • History of non anemic vitamin B12 deficiency   • Essential hypertension   • Hyperlipidemia   • Osteoarthritis   • Osteopenia   • Thyroid nodule     Past Medical History:   Diagnosis Date   • Basal cell carcinoma    • Depression    • Diverticulitis of colon    • Endometrioma    • Hypertension    • Lipid disorder    • Osteoarthritis    • Thyroid nodule      Past Surgical History:   Procedure Laterality Date   • DILATION AND CURETTAGE OF UTERUS     • HYSTERECTOMY     • LAPAROTOMY OOPHERECTOMY     • WRIST SURGERY Right      Allergies   Allergen Reactions   • No Known Allergies      Current Outpatient Medications   Medication Sig Dispense Refill   • celecoxib (celeBREX) 100 mg capsule Take 100 mg by mouth 2 (two) times a day.     • ezetimibe (ZETIA) 10 mg tablet TAKE 1 TABLET DAILY 90 tablet 3   • hydrochlorothiazide (HYDRODIURIL) 25 mg tablet Take 1 tablet (25 mg total) by mouth once daily. 90 tablet 1   • hydrochlorothiazide (HYDRODIURIL) 25 mg tablet Take 1 tablet (25 mg total) by mouth once daily. 90 tablet 1   • hydrocortisone (ANUSOL-HC) 25 mg suppository Insert 1 suppository (25 mg total) into the rectum 2 (two) times a day for 10 days. 20 suppository 1   • metoprolol succinate XL (TOPROL-XL) 100 mg 24 hr tablet TAKE 1 TABLET DAILY 90 tablet 3   • pantoprazole (PROTONIX) 40 mg EC tablet TAKE 1 TABLET TWICE A  tablet 3   • PARoxetine CR (PAXIL-CR) 12.5 mg 24 hr tablet TAKE 1 TABLET DAILY 90 tablet 3   • ramipriL (ALTACE) 10 mg capsule TAKE 2 CAPSULES ONCE  "DAILY 180 capsule 1   • rosuvastatin (CRESTOR) 40 mg tablet TAKE 1 TABLET DAILY 90 tablet 2   • celecoxib (celeBREX) 100 mg capsule Take 1 capsule (100 mg total) by mouth 2 (two) times a day. 180 capsule 1     No current facility-administered medications for this visit.      Social History     Tobacco Use   • Smoking status: Never Smoker   • Smokeless tobacco: Never Used   Substance Use Topics   • Alcohol use: Not on file   • Drug use: Not on file     Family History   Problem Relation Age of Onset   • Breast cancer Biological Mother 80   • Hypertension Biological Mother    • Stroke Biological Mother    • Colon cancer Biological Father    • Hypertension Biological Father    • Stroke Paternal Grandmother    • Colon cancer Father's Brother    • Colon cancer Cousin    • Stroke Father's Sister        Objective   Vitals  Vitals:    06/30/20 0752   BP: 122/76   BP Location: Left upper arm   Patient Position: Sitting   Pulse: 60   Temp: (!) 35.6 °C (96 °F)   TempSrc: Temporal   SpO2: 97%   Weight: 78.1 kg (172 lb 3.2 oz)   Height: 1.613 m (5' 3.5\")     Body mass index is 30.03 kg/m².    Advanced Care Plan  Does patient have advance directive?: Yes                                     PHQ  Have You Felt Down, Depressed or Hopeless?: no  Have You Felt Little Interest or Pleasure in Doing Things?: no                                              Mini Cog  Completed: Yes  Score: 5  Result: Negative    Get Up and Go  Result: Pass            STEADI Falls Risk  One or more falls in the last year: No                                           Worried about falling: No                   Hearing and Vision Screening  Vision Screening Comments: Pt sees eye dr yearly   See HRA for relevant hearing screening response.      Assessment/Plan   Diagnoses and all orders for this visit:    Medicare annual wellness visit, subsequent (Primary)    Chronic coronary artery disease    Essential hypertension    Hyperlipidemia, unspecified " hyperlipidemia type    Osteoarthritis of both hands, unspecified osteoarthritis type    Breast pain, left  -     BI DIAGNOSTIC MAMMOGRAM BILATERAL; Future    Goiter    Major depressive disorder with single episode, in full remission (CMS/[facility])    Other orders  -     celecoxib (celeBREX) 100 mg capsule; Take 1 capsule (100 mg total) by mouth 2 (two) times a day.    1. Medicare annual wellness visit, subsequent  MA W completed today.  All categories of the Medicare annual well addressed, and entered in the EMR.  Please see attachments.    Patient has no family history of AAA.  She is not been a smoker and does not need CT lung cancer screening.  We will do the mammogram diagnostic because of breast pain.  She has her laboratory requisition, and she will have her labs done soon.  It had been postponed because of COVID-19.  She is up-to-date with colonoscopy within the last year, and bone density within the last 2 years.  Immunizations also up-to-date.  She will return in September for flu vaccine.  Up-to-date with the pneumococcal vaccine.  Was vaccine provided by pharmacy.    2. Chronic coronary artery disease  Asymptomatic with no chest pain.    3. Essential hypertension  At goal.    4. Hyperlipidemia, unspecified hyperlipidemia type  LDL will be drawn again soon.  She has done well LDL now down to 84.    5. Osteoarthritis of both hands, unspecified osteoarthritis type  Patient working with Dr. Hernandez.    6. Breast pain, left  No masses palpable.  However, reproducible left breast pain.  She will have a diagnostic mammogram instead.  - BI DIAGNOSTIC MAMMOGRAM BILATERAL; Future    7.  Peptic ulcer disease  Found on upper endoscopy.  Patient has done well with the pantoprazole 40 mg twice daily.  She had been on NSAIDs at the time.  Because of the DJD of her hands, she asked about going back on the naproxen.  It is a better pain reliever in her situation than the Celebrex.  That makes me anxious and that the NSAIDs  have a higher incidence of peptic ulcer disease than the Thomas twos.  The Thomas 2 is also have their limits.  Rather than switching back to the naproxen, I suggested the patient increase the dose of the Celebrex as she only takes 100 mg sporadically.  She should try 200 mg when she has pain.  She does have the pantoprazole, and does take a double dose when she takes the Thomas 2.  She should continue to do that.  He has GI upset, she should let me know.    8. Major depressive disorder with single episode, in full remission (CMS/HCC)  Doing well.  Affect bright.  PHQ 2 screen negative.    I, Sierra Montanez, personally performed the services described in this documentation, as documented by the scribe in my presence, and it is both accurate and complete.  6/30/2020 9:12 AM        See Patient Instructions (the written plan) which was given to the patient for PPPS and health risk factors with interventions.

## 2020-07-07 ENCOUNTER — HOSPITAL ENCOUNTER (OUTPATIENT)
Dept: RADIOLOGY | Age: 70
Discharge: HOME | End: 2020-07-07
Attending: INTERNAL MEDICINE
Payer: COMMERCIAL

## 2020-07-07 ENCOUNTER — HOSPITAL ENCOUNTER (OUTPATIENT)
Dept: RADIOLOGY | Age: 70
Discharge: HOME | End: 2020-07-07
Attending: NURSE PRACTITIONER
Payer: COMMERCIAL

## 2020-07-07 ENCOUNTER — HOSPITAL ENCOUNTER (OUTPATIENT)
Dept: RADIOLOGY | Age: 70
Discharge: HOME | End: 2020-07-07
Attending: RADIOLOGY
Payer: COMMERCIAL

## 2020-07-07 DIAGNOSIS — N64.4 BREAST PAIN, LEFT: ICD-10-CM

## 2020-07-07 DIAGNOSIS — E04.9 GOITER: ICD-10-CM

## 2020-07-07 PROCEDURE — 76642 ULTRASOUND BREAST LIMITED: CPT | Mod: 50

## 2020-07-07 PROCEDURE — 76536 US EXAM OF HEAD AND NECK: CPT

## 2020-07-07 PROCEDURE — 77066 DX MAMMO INCL CAD BI: CPT

## 2020-09-29 ENCOUNTER — APPOINTMENT (OUTPATIENT)
Dept: LAB | Facility: CLINIC | Age: 70
End: 2020-09-29
Attending: NURSE PRACTITIONER
Payer: COMMERCIAL

## 2020-09-29 DIAGNOSIS — Z13.0 SCREENING, ANEMIA, DEFICIENCY, IRON: ICD-10-CM

## 2020-09-29 DIAGNOSIS — R73.9 HYPERGLYCEMIA: ICD-10-CM

## 2020-09-29 DIAGNOSIS — Z13.89 SCREENING FOR HEMATURIA OR PROTEINURIA: ICD-10-CM

## 2020-09-29 DIAGNOSIS — E55.9 VITAMIN D DEFICIENCY: ICD-10-CM

## 2020-09-29 DIAGNOSIS — E78.5 HYPERLIPIDEMIA, UNSPECIFIED HYPERLIPIDEMIA TYPE: ICD-10-CM

## 2020-09-29 DIAGNOSIS — E04.9 GOITER: ICD-10-CM

## 2020-09-29 LAB
25(OH)D3 SERPL-MCNC: 44 NG/ML (ref 30–100)
ALBUMIN SERPL-MCNC: 3.9 G/DL (ref 3.4–5)
ALP SERPL-CCNC: 53 IU/L (ref 35–126)
ALT SERPL-CCNC: 18 IU/L (ref 11–54)
ANION GAP SERPL CALC-SCNC: 9 MEQ/L (ref 3–15)
AST SERPL-CCNC: 18 IU/L (ref 15–41)
BACTERIA URNS QL MICRO: ABNORMAL /HPF
BASOPHILS # BLD: 0.05 K/UL (ref 0.01–0.1)
BASOPHILS NFR BLD: 0.7 %
BILIRUB SERPL-MCNC: 0.9 MG/DL (ref 0.3–1.2)
BILIRUB UR QL STRIP.AUTO: NEGATIVE MG/DL
BUN SERPL-MCNC: 22 MG/DL (ref 8–20)
CALCIUM SERPL-MCNC: 9.8 MG/DL (ref 8.9–10.3)
CHLORIDE SERPL-SCNC: 107 MEQ/L (ref 98–109)
CHOLEST SERPL-MCNC: 172 MG/DL
CLARITY UR REFRACT.AUTO: CLEAR
CO2 SERPL-SCNC: 26 MEQ/L (ref 22–32)
COLOR UR AUTO: YELLOW
CREAT SERPL-MCNC: 0.9 MG/DL (ref 0.6–1.1)
DIFFERENTIAL METHOD BLD: ABNORMAL
EOSINOPHIL # BLD: 0.11 K/UL (ref 0.04–0.36)
EOSINOPHIL NFR BLD: 1.6 %
ERYTHROCYTE [DISTWIDTH] IN BLOOD BY AUTOMATED COUNT: 14.7 % (ref 11.7–14.4)
EST. AVERAGE GLUCOSE BLD GHB EST-MCNC: 128 MG/DL
GFR SERPL CREATININE-BSD FRML MDRD: >60 ML/MIN/1.73M*2
GLUCOSE SERPL-MCNC: 104 MG/DL (ref 70–99)
GLUCOSE UR STRIP.AUTO-MCNC: NEGATIVE MG/DL
HBA1C MFR BLD HPLC: 6.1 %
HCT VFR BLDCO AUTO: 45.4 % (ref 35–45)
HDLC SERPL-MCNC: 57 MG/DL
HDLC SERPL: 3 {RATIO}
HGB BLD-MCNC: 13.7 G/DL (ref 11.8–15.7)
HGB UR QL STRIP.AUTO: NEGATIVE
HYALINE CASTS #/AREA URNS LPF: ABNORMAL /LPF
IMM GRANULOCYTES # BLD AUTO: 0.02 K/UL (ref 0–0.08)
IMM GRANULOCYTES NFR BLD AUTO: 0.3 %
KETONES UR STRIP.AUTO-MCNC: NEGATIVE MG/DL
LDLC SERPL CALC-MCNC: 85 MG/DL
LEUKOCYTE ESTERASE UR QL STRIP.AUTO: NEGATIVE
LYMPHOCYTES # BLD: 1.55 K/UL (ref 1.2–3.5)
LYMPHOCYTES NFR BLD: 22.7 %
MCH RBC QN AUTO: 28.1 PG (ref 28–33.2)
MCHC RBC AUTO-ENTMCNC: 30.2 G/DL (ref 32.2–35.5)
MCV RBC AUTO: 93 FL (ref 83–98)
MONOCYTES # BLD: 0.66 K/UL (ref 0.28–0.8)
MONOCYTES NFR BLD: 9.7 %
NEUTROPHILS # BLD: 4.43 K/UL (ref 1.7–7)
NEUTS SEG NFR BLD: 65 %
NITRITE UR QL STRIP.AUTO: NEGATIVE
NONHDLC SERPL-MCNC: 115 MG/DL
NRBC BLD-RTO: 0 %
PDW BLD AUTO: 11.7 FL (ref 9.4–12.3)
PH UR STRIP.AUTO: 5.5 [PH]
PLATELET # BLD AUTO: 263 K/UL (ref 150–369)
POTASSIUM SERPL-SCNC: 4.5 MEQ/L (ref 3.6–5.1)
PROT SERPL-MCNC: 6.2 G/DL (ref 6–8.2)
PROT UR QL STRIP.AUTO: NEGATIVE
RBC # BLD AUTO: 4.88 M/UL (ref 3.93–5.22)
RBC #/AREA URNS HPF: ABNORMAL /HPF
SODIUM SERPL-SCNC: 142 MEQ/L (ref 136–144)
SP GR UR REFRACT.AUTO: 1.02
SQUAMOUS URNS QL MICRO: 1 /HPF
TRIGL SERPL-MCNC: 148 MG/DL (ref 30–149)
TSH SERPL DL<=0.05 MIU/L-ACNC: 0.76 MIU/L (ref 0.34–5.6)
UROBILINOGEN UR STRIP-ACNC: 0.2 EU/DL
WBC # BLD AUTO: 6.82 K/UL (ref 3.8–10.5)
WBC #/AREA URNS HPF: ABNORMAL /HPF

## 2020-09-29 PROCEDURE — 81003 URINALYSIS AUTO W/O SCOPE: CPT

## 2020-09-29 PROCEDURE — 36415 COLL VENOUS BLD VENIPUNCTURE: CPT

## 2020-09-29 PROCEDURE — 85025 COMPLETE CBC W/AUTO DIFF WBC: CPT

## 2020-09-29 PROCEDURE — 80053 COMPREHEN METABOLIC PANEL: CPT

## 2020-09-29 PROCEDURE — 80061 LIPID PANEL: CPT

## 2020-09-29 PROCEDURE — 83036 HEMOGLOBIN GLYCOSYLATED A1C: CPT

## 2020-09-29 PROCEDURE — 84443 ASSAY THYROID STIM HORMONE: CPT

## 2020-09-29 PROCEDURE — 82306 VITAMIN D 25 HYDROXY: CPT

## 2020-10-01 ENCOUNTER — OFFICE VISIT (OUTPATIENT)
Dept: INTERNAL MEDICINE | Facility: CLINIC | Age: 70
End: 2020-10-01
Payer: COMMERCIAL

## 2020-10-01 VITALS
WEIGHT: 172.4 LBS | DIASTOLIC BLOOD PRESSURE: 82 MMHG | HEART RATE: 63 BPM | SYSTOLIC BLOOD PRESSURE: 124 MMHG | TEMPERATURE: 98.4 F | HEIGHT: 64 IN | BODY MASS INDEX: 29.43 KG/M2 | OXYGEN SATURATION: 98 % | RESPIRATION RATE: 18 BRPM

## 2020-10-01 DIAGNOSIS — I10 ESSENTIAL HYPERTENSION: Primary | ICD-10-CM

## 2020-10-01 DIAGNOSIS — F32.5 MAJOR DEPRESSIVE DISORDER WITH SINGLE EPISODE, IN FULL REMISSION (CMS/HCC): ICD-10-CM

## 2020-10-01 DIAGNOSIS — I25.10 CHRONIC CORONARY ARTERY DISEASE: ICD-10-CM

## 2020-10-01 DIAGNOSIS — Z78.0 MENOPAUSE: ICD-10-CM

## 2020-10-01 DIAGNOSIS — E04.2 MULTIPLE THYROID NODULES: ICD-10-CM

## 2020-10-01 DIAGNOSIS — M19.042 OSTEOARTHRITIS OF BOTH HANDS, UNSPECIFIED OSTEOARTHRITIS TYPE: ICD-10-CM

## 2020-10-01 DIAGNOSIS — Z23 NEEDS FLU SHOT: ICD-10-CM

## 2020-10-01 DIAGNOSIS — Z12.11 SCREENING FOR COLON CANCER: ICD-10-CM

## 2020-10-01 DIAGNOSIS — M62.838 MUSCLE SPASM: ICD-10-CM

## 2020-10-01 DIAGNOSIS — M19.041 OSTEOARTHRITIS OF BOTH HANDS, UNSPECIFIED OSTEOARTHRITIS TYPE: ICD-10-CM

## 2020-10-01 DIAGNOSIS — E78.5 HYPERLIPIDEMIA, UNSPECIFIED HYPERLIPIDEMIA TYPE: ICD-10-CM

## 2020-10-01 PROCEDURE — 90694 VACC AIIV4 NO PRSRV 0.5ML IM: CPT | Performed by: INTERNAL MEDICINE

## 2020-10-01 PROCEDURE — 99214 OFFICE O/P EST MOD 30 MIN: CPT | Mod: 25 | Performed by: INTERNAL MEDICINE

## 2020-10-01 PROCEDURE — G0008 ADMIN INFLUENZA VIRUS VAC: HCPCS | Performed by: INTERNAL MEDICINE

## 2020-10-01 ASSESSMENT — ENCOUNTER SYMPTOMS
BLOOD IN STOOL: 0
SORE THROAT: 0
DYSPHORIC MOOD: 0
COUGH: 0
APPETITE CHANGE: 0
HEMATURIA: 0
BRUISES/BLEEDS EASILY: 0
PALPITATIONS: 0
DIZZINESS: 0
JOINT SWELLING: 0
FEVER: 0
ARTHRALGIAS: 1
MYALGIAS: 1
CHILLS: 0
BACK PAIN: 0
DECREASED CONCENTRATION: 0
HEADACHES: 1
CHEST TIGHTNESS: 0
SHORTNESS OF BREATH: 0
DYSURIA: 0
DIARRHEA: 0
ABDOMINAL PAIN: 0
RHINORRHEA: 0
NERVOUS/ANXIOUS: 0
NUMBNESS: 0
UNEXPECTED WEIGHT CHANGE: 0
NAUSEA: 0
FREQUENCY: 0
WHEEZING: 0
ABDOMINAL DISTENTION: 0
DIAPHORESIS: 0
ACTIVITY CHANGE: 0
CONSTIPATION: 0
NECK PAIN: 1
FATIGUE: 0

## 2020-10-01 NOTE — PROGRESS NOTES
Subjective    Follow up  Patient ID: Forrest Mueller is a 70 y.o. female.    HPI  Patient presents for a follow up appointment. Patient complains of chronic pain right above the supraspinatus muscle on the right side. Patient claims this pain began a few months ago. She denies trauma or excessive strain on the muscle. Patient claims pain goes up into neck, but she denies any numbness radiating down the arm. Patient reports getting occasional headaches. She also complains of left knee pain.    Coronary artery disease   Monitor response to therapy     Essential Hypertension.   Monitor response to therapy.    Hyperlipidemia   Monitor response to therapy    The following have been reviewed and updated as appropriate in this visit:  Tobacco  Allergies  Meds  Problems  Med Hx  Surg Hx  Fam Hx         Allergies   Allergen Reactions   • No Known Allergies        Current Outpatient Medications   Medication Sig Dispense Refill   • celecoxib (celeBREX) 100 mg capsule Take 100 mg by mouth 2 (two) times a day.     • ezetimibe (ZETIA) 10 mg tablet TAKE 1 TABLET DAILY 90 tablet 3   • hydrochlorothiazide (HYDRODIURIL) 25 mg tablet Take 1 tablet (25 mg total) by mouth once daily. 90 tablet 1   • metoprolol succinate XL (TOPROL-XL) 100 mg 24 hr tablet TAKE 1 TABLET DAILY 90 tablet 3   • pantoprazole (PROTONIX) 40 mg EC tablet TAKE 1 TABLET TWICE A  tablet 3   • PARoxetine CR (PAXIL-CR) 12.5 mg 24 hr tablet TAKE 1 TABLET DAILY 90 tablet 3   • ramipriL (ALTACE) 10 mg capsule TAKE 2 CAPSULES ONCE DAILY 180 capsule 1   • rosuvastatin (CRESTOR) 40 mg tablet TAKE 1 TABLET DAILY 90 tablet 2     No current facility-administered medications for this visit.        Past Medical History:   Diagnosis Date   • Basal cell carcinoma    • Depression    • Diverticulitis of colon    • Endometrioma    • Hypertension    • Lipid disorder    • Osteoarthritis    • Thyroid nodule      Family History   Problem Relation Age of Onset   •  Breast cancer Biological Mother 80   • Hypertension Biological Mother    • Stroke Biological Mother    • Colon cancer Biological Father    • Hypertension Biological Father    • Stroke Paternal Grandmother    • Colon cancer Father's Brother    • Colon cancer Cousin    • Stroke Father's Sister      Social History     Socioeconomic History   • Marital status:      Spouse name: Not on file   • Number of children: Not on file   • Years of education: Not on file   • Highest education level: Not on file   Occupational History   • Not on file   Social Needs   • Financial resource strain: Not on file   • Food insecurity     Worry: Not on file     Inability: Not on file   • Transportation needs     Medical: Not on file     Non-medical: Not on file   Tobacco Use   • Smoking status: Never Smoker   • Smokeless tobacco: Never Used   Substance and Sexual Activity   • Alcohol use: Not on file   • Drug use: Not on file   • Sexual activity: Not on file   Lifestyle   • Physical activity     Days per week: Not on file     Minutes per session: Not on file   • Stress: Not on file   Relationships   • Social connections     Talks on phone: Not on file     Gets together: Not on file     Attends Jain service: Not on file     Active member of club or organization: Not on file     Attends meetings of clubs or organizations: Not on file     Relationship status: Not on file   • Intimate partner violence     Fear of current or ex partner: Not on file     Emotionally abused: Not on file     Physically abused: Not on file     Forced sexual activity: Not on file   Other Topics Concern   • Not on file   Social History Narrative   • Not on file       Review of Systems   Constitutional: Negative for activity change, appetite change, chills, diaphoresis, fatigue, fever and unexpected weight change.   HENT: Negative for congestion, ear pain, postnasal drip, rhinorrhea and sore throat.    Eyes: Negative for visual disturbance.   Respiratory:  "Negative for cough, chest tightness, shortness of breath and wheezing.    Cardiovascular: Negative for chest pain, palpitations and leg swelling.   Gastrointestinal: Negative for abdominal distention, abdominal pain, blood in stool, constipation, diarrhea and nausea.   Genitourinary: Negative for dysuria, frequency, hematuria and urgency.   Musculoskeletal: Positive for arthralgias (left knee), myalgias (right back/shoulder) and neck pain. Negative for back pain and joint swelling.   Skin: Negative for rash.   Neurological: Positive for headaches (occasional). Negative for dizziness and numbness.   Hematological: Does not bruise/bleed easily.   Psychiatric/Behavioral: Negative for decreased concentration and dysphoric mood. The patient is not nervous/anxious.        Objective     Visit Vitals  /82 (BP Location: Right upper arm, Patient Position: Sitting)   Pulse 63   Temp 36.9 °C (98.4 °F)   Resp 18   Ht 1.613 m (5' 3.5\")   Wt 78.2 kg (172 lb 6.4 oz)   LMP  (LMP Unknown)   SpO2 98%   BMI 30.06 kg/m²     BP Readings from Last 3 Encounters:   10/01/20 124/82   06/30/20 122/76   12/05/19 132/80     Wt Readings from Last 3 Encounters:   10/01/20 78.2 kg (172 lb 6.4 oz)   06/30/20 78.1 kg (172 lb 3.2 oz)   11/19/19 79.2 kg (174 lb 9.6 oz)       Physical Exam  Vitals signs and nursing note reviewed.   Constitutional:       General: She is not in acute distress.     Appearance: She is well-developed.   HENT:      Head: Normocephalic and atraumatic.      Right Ear: External ear normal.      Left Ear: External ear normal.      Mouth/Throat:      Pharynx: No oropharyngeal exudate.   Eyes:      Pupils: Pupils are equal, round, and reactive to light.   Neck:      Thyroid: No thyromegaly.      Vascular: No JVD.      Trachea: No tracheal deviation.   Cardiovascular:      Rate and Rhythm: Normal rate and regular rhythm.      Pulses: Decreased pulses (carotid upstroke, no bruits).      Heart sounds: Murmur present. Systolic " (soft, heard best at right upper sternal border) murmur present.   Pulmonary:      Effort: Pulmonary effort is normal. No respiratory distress.      Breath sounds: Normal breath sounds. No stridor. No wheezing or rales.   Abdominal:      General: Bowel sounds are normal. There is no distension.      Palpations: Abdomen is soft. There is no mass.      Tenderness: There is no abdominal tenderness. There is no guarding.   Musculoskeletal: Normal range of motion.      Comments: Reproducible tenderness with chronic bogginess over right supraspinatus muscle  Bogginess right greater than left over trapezius muscle   Lymphadenopathy:      Cervical: No cervical adenopathy.   Skin:     General: Skin is warm and dry.   Neurological:      Mental Status: She is alert and oriented to person, place, and time.   Psychiatric:         Behavior: Behavior normal.         Lab Results   Component Value Date    WBC 6.82 09/29/2020    HGB 13.7 09/29/2020    HCT 45.4 (H) 09/29/2020    MCV 93.0 09/29/2020     09/29/2020     Lab Results   Component Value Date    GLUCOSE 104 (H) 09/29/2020    CALCIUM 9.8 09/29/2020     09/29/2020    K 4.5 09/29/2020    CO2 26 09/29/2020     09/29/2020    BUN 22 (H) 09/29/2020    CREATININE 0.9 09/29/2020         Assessment/Plan   Problem List Items Addressed This Visit        Circulatory    Chronic coronary artery disease - Primary    Essential hypertension       Musculoskeletal    Osteoarthritis       Endocrine/Metabolic    Hyperlipidemia       Other    Major depressive disorder with single episode, in full remission (CMS/HCC)      Other Visit Diagnoses     Menopause        Relevant Orders    DEXA BONE DENSITY    Needs flu shot        Relevant Orders    Influenza vaccine Quad Adjuvanted 65 and Older (FluAd Quad)    Screening for colon cancer        Relevant Orders    FIT        1. Essential hypertension  Good control current regimen.  No changes made.    2. Hyperlipidemia, unspecified  hyperlipidemia type  LDL at goal at 85.  Continue current dose of statin.    3. Chronic coronary artery disease  Patient with good pharmacologic control with statin, beta-blocker, ACE inhibitor, and Zetia.  Patient also on hydrochlorothiazide.    4. Menopause  Bone density due.  - DEXA BONE DENSITY; Future    5. Osteoarthritis of both knees, unspecified osteoarthritis type  Patient is working with orthopedic surgery.  Patient is going to have Synvisc.    6. Needs flu shot    - Influenza vaccine Quad Adjuvanted 65 and Older (FluAd Quad)    7. Major depressive disorder with single episode, in full remission (CMS/HCC)  Doing very well on paroxetine.  Some anxiety with COVID as far as her children goes.  Otherwise doing well.    8. Screening for colon cancer  Up-to-date with colonoscopy.  - FIT; Future    9. Muscle spasm  Supraspinatus muscle and trapezius muscle involved.  Patient has a son who is a physical therapist.  He will give her some suggestions.     10.  Multiple thyroid nodules  Ultrasound reviewed.  Nodules are stable.  Thyroid functioning.  TSH normal.    By signing my name below, I, Marcia Alejo, attest that this documentation has been prepared under the direction and in the presence of Sierra Montanez DO  10/1/2020 9:11 AM  Sierra Montanez DO     I, Sierra Montanez, personally performed the services described in this documentation, as documented by the scribe in my presence, and it is both accurate and complete.  10/1/2020 9:19 AM    10/1/2020

## 2020-10-01 NOTE — PATIENT INSTRUCTIONS
Suggestions for good sleep hygiene  1.  Turn off all screens at least 1 hour prior to bed.  This includes the television.  Blue light is stimulating.  2.  Sleep in a dark room.  If you do wake up during the night, do not look at the clock to see what time it is.  3.  Consider white noise.  That would be helpful.  4.  Try lavender oil.  Rub a few drops on your hand, take 3 deep breaths from the sent on your palms, and then rub your palms on your pillow.  5.  Exercise regularly.  However, do not exercise right before bed.  It will cause release of hormones that are stimulating.  6.  Go to sleep at the same time nightly.  This is helpful as well.  7.  If we want to consider medication, we can use melatonin.  It comes in 3-5 mg tabs.  Maximum dose is 10 mg nightly.    4-7-8 Breathing Technique:   1. Sit up straight   2. Place the tip of your tongue up against the back of your front teeth. Keep it there through the entire breathing process   3. Breathe in silently through your nose to the count of four   4. Hold your breath to the count of seven   5. Exhale through your mouth to the count of eight, making an audible “woosh” sound   6. That completes one full breath. Repeat the cycle another three times, for a total of four breaths

## 2020-10-30 RX ORDER — HYDROCHLOROTHIAZIDE 25 MG/1
TABLET ORAL
Qty: 90 TABLET | Refills: 3 | Status: SHIPPED | OUTPATIENT
Start: 2020-10-30 | End: 2021-10-25

## 2020-11-05 ENCOUNTER — CLINICAL SUPPORT (OUTPATIENT)
Dept: INTERNAL MEDICINE | Facility: CLINIC | Age: 70
End: 2020-11-05
Payer: COMMERCIAL

## 2020-11-05 DIAGNOSIS — Z23 NEED FOR SHINGLES VACCINE: Primary | ICD-10-CM

## 2020-11-05 PROCEDURE — 90471 IMMUNIZATION ADMIN: CPT | Performed by: INTERNAL MEDICINE

## 2020-11-05 PROCEDURE — 90750 HZV VACC RECOMBINANT IM: CPT | Performed by: INTERNAL MEDICINE

## 2020-11-27 RX ORDER — RAMIPRIL 10 MG/1
CAPSULE ORAL
Qty: 180 CAPSULE | Refills: 3 | Status: SHIPPED | OUTPATIENT
Start: 2020-11-27 | End: 2021-10-25

## 2020-12-09 RX ORDER — CELECOXIB 100 MG/1
CAPSULE ORAL
Qty: 180 CAPSULE | Refills: 3 | Status: SHIPPED | OUTPATIENT
Start: 2020-12-09 | End: 2022-01-03

## 2020-12-14 ENCOUNTER — OFFICE VISIT (OUTPATIENT)
Dept: INTERNAL MEDICINE | Facility: CLINIC | Age: 70
End: 2020-12-14
Payer: COMMERCIAL

## 2020-12-14 VITALS
BODY MASS INDEX: 29.4 KG/M2 | OXYGEN SATURATION: 98 % | WEIGHT: 172.2 LBS | HEART RATE: 62 BPM | DIASTOLIC BLOOD PRESSURE: 72 MMHG | TEMPERATURE: 98.2 F | HEIGHT: 64 IN | SYSTOLIC BLOOD PRESSURE: 128 MMHG

## 2020-12-14 DIAGNOSIS — E78.5 HYPERLIPIDEMIA, UNSPECIFIED HYPERLIPIDEMIA TYPE: ICD-10-CM

## 2020-12-14 DIAGNOSIS — E11.9 DIET-CONTROLLED DIABETES MELLITUS (CMS/HCC): ICD-10-CM

## 2020-12-14 DIAGNOSIS — R09.89 DECREASED CAROTID PULSE: ICD-10-CM

## 2020-12-14 DIAGNOSIS — F32.5 MAJOR DEPRESSIVE DISORDER WITH SINGLE EPISODE, IN FULL REMISSION (CMS/HCC): ICD-10-CM

## 2020-12-14 DIAGNOSIS — I10 ESSENTIAL HYPERTENSION: Primary | ICD-10-CM

## 2020-12-14 DIAGNOSIS — E04.1 THYROID NODULE: ICD-10-CM

## 2020-12-14 LAB
ALBUMIN/CREAT UR: 3.1 UG/MG
CREAT UR-MCNC: 96.8 MG/DL
MICROALBUMIN UR-MCNC: 3 MG/L

## 2020-12-14 PROCEDURE — 93000 ELECTROCARDIOGRAM COMPLETE: CPT | Performed by: INTERNAL MEDICINE

## 2020-12-14 PROCEDURE — 82570 ASSAY OF URINE CREATININE: CPT | Performed by: INTERNAL MEDICINE

## 2020-12-14 PROCEDURE — 99214 OFFICE O/P EST MOD 30 MIN: CPT | Performed by: INTERNAL MEDICINE

## 2020-12-14 ASSESSMENT — ENCOUNTER SYMPTOMS
ACTIVITY CHANGE: 0
CONSTIPATION: 0
APPETITE CHANGE: 0
WHEEZING: 0
SHORTNESS OF BREATH: 0
MYALGIAS: 0
PALPITATIONS: 0
BRUISES/BLEEDS EASILY: 0
ARTHRALGIAS: 0
HEMATURIA: 0
NERVOUS/ANXIOUS: 0
RHINORRHEA: 0
FATIGUE: 0
ABDOMINAL PAIN: 0
HEADACHES: 0
BLOOD IN STOOL: 0
DIARRHEA: 1
CHILLS: 0
VOMITING: 0
NAUSEA: 0
ABDOMINAL DISTENTION: 0
COUGH: 0
CHEST TIGHTNESS: 0
DECREASED CONCENTRATION: 0
DIZZINESS: 0
DYSURIA: 0
DIAPHORESIS: 0
SORE THROAT: 0
FEVER: 0
JOINT SWELLING: 0
BACK PAIN: 0
UNEXPECTED WEIGHT CHANGE: 0
DYSPHORIC MOOD: 0
SLEEP DISTURBANCE: 0
FREQUENCY: 0

## 2020-12-14 NOTE — PROGRESS NOTES
Subjective      Follow up      Patient ID: Forrest Mueller is a 70 y.o. female.    HPI     Essential Hypertension.   Monitor response to therapy.    Hyperlipidemia   Monitor response to therapy    Diabetes Mellitus.  Monitor response to therapy.          The following have been reviewed and updated as appropriate in this visit:    Reviewed: Medications/Allergies/Problem List/Medical/Surgical History    Subjective      Patient ID: Forrest Mueller is a 70 y.o. female.    HPI    The following have been reviewed and updated as appropriate in this visit:  Tobacco  Allergies  Meds  Problems  Med Hx  Surg Hx  Fam Hx         Allergies   Allergen Reactions   • No Known Allergies        Current Outpatient Medications   Medication Sig Dispense Refill   • celecoxib (celeBREX) 100 mg capsule TAKE 1 CAPSULE TWICE A  capsule 3   • ezetimibe (ZETIA) 10 mg tablet TAKE 1 TABLET DAILY 90 tablet 3   • hydrochlorothiazide (HYDRODIURIL) 25 mg tablet TAKE 1 TABLET DAILY 90 tablet 3   • metoprolol succinate XL (TOPROL-XL) 100 mg 24 hr tablet TAKE 1 TABLET DAILY 90 tablet 3   • pantoprazole (PROTONIX) 40 mg EC tablet TAKE 1 TABLET TWICE A  tablet 3   • PARoxetine CR (PAXIL-CR) 12.5 mg 24 hr tablet TAKE 1 TABLET DAILY 90 tablet 3   • ramipriL (ALTACE) 10 mg capsule TAKE 2 CAPSULES ONCE DAILY 180 capsule 3   • rosuvastatin (CRESTOR) 40 mg tablet TAKE 1 TABLET DAILY 90 tablet 2     No current facility-administered medications for this visit.        Past Medical History:   Diagnosis Date   • Basal cell carcinoma    • Depression    • Diverticulitis of colon    • Endometrioma    • Hypertension    • Lipid disorder    • Osteoarthritis    • Thyroid nodule      Family History   Problem Relation Age of Onset   • Breast cancer Biological Mother 80   • Hypertension Biological Mother    • Stroke Biological Mother    • Colon cancer Biological Father    • Hypertension Biological Father    • Stroke Paternal Grandmother     • Colon cancer Father's Brother    • Colon cancer Cousin    • Stroke Father's Sister      Social History     Socioeconomic History   • Marital status:      Spouse name: Not on file   • Number of children: Not on file   • Years of education: Not on file   • Highest education level: Not on file   Occupational History   • Not on file   Social Needs   • Financial resource strain: Not on file   • Food insecurity     Worry: Not on file     Inability: Not on file   • Transportation needs     Medical: Not on file     Non-medical: Not on file   Tobacco Use   • Smoking status: Never Smoker   • Smokeless tobacco: Never Used   Substance and Sexual Activity   • Alcohol use: Not on file   • Drug use: Not on file   • Sexual activity: Not on file   Lifestyle   • Physical activity     Days per week: Not on file     Minutes per session: Not on file   • Stress: Not on file   Relationships   • Social connections     Talks on phone: Not on file     Gets together: Not on file     Attends Church service: Not on file     Active member of club or organization: Not on file     Attends meetings of clubs or organizations: Not on file     Relationship status: Not on file   • Intimate partner violence     Fear of current or ex partner: Not on file     Emotionally abused: Not on file     Physically abused: Not on file     Forced sexual activity: Not on file   Other Topics Concern   • Not on file   Social History Narrative   • Not on file       Review of Systems   Constitutional: Negative for activity change, appetite change, chills, diaphoresis, fatigue, fever and unexpected weight change.   HENT: Negative for congestion, ear pain, postnasal drip, rhinorrhea and sore throat.    Eyes: Negative for visual disturbance.   Respiratory: Negative for cough, chest tightness, shortness of breath and wheezing.    Cardiovascular: Negative for chest pain, palpitations and leg swelling.   Gastrointestinal: Positive for diarrhea. Negative for  "abdominal distention, abdominal pain, blood in stool, constipation, nausea and vomiting.        Stable.  Doing well with fiber supplement.  Doing well with fiber supplement.   Genitourinary: Negative for dysuria, frequency, hematuria and urgency.   Musculoskeletal: Negative for arthralgias, back pain, joint swelling and myalgias.   Skin: Negative for rash.   Neurological: Negative for dizziness and headaches.   Hematological: Does not bruise/bleed easily.   Psychiatric/Behavioral: Negative for decreased concentration, dysphoric mood and sleep disturbance. The patient is not nervous/anxious.        Objective     Visit Vitals  /72   Pulse 62   Temp 36.8 °C (98.2 °F) (Oral)   Ht 1.613 m (5' 3.5\")   Wt 78.1 kg (172 lb 3.2 oz)   LMP  (LMP Unknown)   SpO2 98%   BMI 30.03 kg/m²     BP Readings from Last 3 Encounters:   12/14/20 128/72   10/01/20 124/82   06/30/20 122/76     Wt Readings from Last 3 Encounters:   12/14/20 78.1 kg (172 lb 3.2 oz)   10/01/20 78.2 kg (172 lb 6.4 oz)   06/30/20 78.1 kg (172 lb 3.2 oz)       Physical Exam  Vitals signs and nursing note reviewed.   Constitutional:       General: She is not in acute distress.     Appearance: Normal appearance. She is well-developed.   HENT:      Head: Normocephalic and atraumatic.      Right Ear: External ear normal.      Left Ear: External ear normal.      Mouth/Throat:      Pharynx: No oropharyngeal exudate.   Eyes:      Pupils: Pupils are equal, round, and reactive to light.   Neck:      Thyroid: No thyromegaly.      Vascular: No JVD.      Trachea: No tracheal deviation.   Cardiovascular:      Rate and Rhythm: Normal rate and regular rhythm.      Pulses:           Carotid pulses are 1+ on the right side and 1+ on the left side.       Dorsalis pedis pulses are 2+ on the right side and 2+ on the left side.        Posterior tibial pulses are 2+ on the right side and 2+ on the left side.      Heart sounds: S1 normal and S2 normal. No murmur. No S3 or S4 " sounds.       Comments: Decreased carotid pulse bilaterally, no bruits.  Pulmonary:      Effort: No respiratory distress.      Breath sounds: No stridor. No decreased breath sounds, wheezing, rhonchi or rales.   Abdominal:      General: Bowel sounds are normal. There is no abdominal bruit.      Palpations: Abdomen is soft.      Tenderness: There is no abdominal tenderness.   Lymphadenopathy:      Cervical: No cervical adenopathy.      Right cervical: No superficial or posterior cervical adenopathy.     Left cervical: No superficial or posterior cervical adenopathy.      Upper Body:      Right upper body: No supraclavicular adenopathy.      Left upper body: No supraclavicular adenopathy.   Skin:     General: Skin is warm and dry.      Findings: No rash.   Neurological:      Mental Status: She is alert and oriented to person, place, and time.      Deep Tendon Reflexes:      Reflex Scores:       Patellar reflexes are 2+ on the right side and 2+ on the left side.  Psychiatric:         Speech: Speech normal.         Behavior: Behavior normal.         Lab Results   Component Value Date    WBC 6.82 09/29/2020    HGB 13.7 09/29/2020    HCT 45.4 (H) 09/29/2020    MCV 93.0 09/29/2020     09/29/2020     Lab Results   Component Value Date    GLUCOSE 104 (H) 09/29/2020    CALCIUM 9.8 09/29/2020     09/29/2020    K 4.5 09/29/2020    CO2 26 09/29/2020     09/29/2020    BUN 22 (H) 09/29/2020    CREATININE 0.9 09/29/2020         Assessment/Plan   Problem List Items Addressed This Visit        Circulatory    Essential hypertension - Primary    Relevant Orders    ECG 12 lead (Completed)       Endocrine/Metabolic    Hyperlipidemia    Thyroid nodule    Diet-controlled diabetes mellitus (CMS/HCC)    Relevant Orders    Microalbumin/Creatinine Ur Random (Completed)       Other    Major depressive disorder with single episode, in full remission (CMS/[facility])      Other Visit Diagnoses     Decreased carotid pulse         Relevant Orders    Ultrasound carotid bilateral        1. Essential hypertension  Good control on current regimen.   - ECG 12 lead    2. Diet-controlled diabetes mellitus (CMS/HCC)  A1C  At goal at 6.1  - Microalbumin/Creatinine Ur Random    3. Hyperlipidemia, unspecified hyperlipidemia type  LDL at 85    4. Thyroid nodule  US is stable. No sig change in nodules.     5. Major depressive disorder with single episode, in full remission (CMS/HCC)  Doing well. PHQ2 screen is negative. Pt is concerned re one of her children. Overall doing well.     6. Decreased carotid pulse  - Ultrasound carotid bilateral; Future    Sierra Montanez, DO  12/16/2020

## 2021-02-15 ENCOUNTER — TELEPHONE (OUTPATIENT)
Dept: INTERNAL MEDICINE | Facility: CLINIC | Age: 71
End: 2021-02-15

## 2021-02-17 RX ORDER — ROSUVASTATIN CALCIUM 40 MG/1
TABLET, COATED ORAL
Qty: 90 TABLET | Refills: 3 | Status: SHIPPED | OUTPATIENT
Start: 2021-02-17 | End: 2022-05-05 | Stop reason: SDUPTHER

## 2021-05-06 ENCOUNTER — OFFICE VISIT (OUTPATIENT)
Dept: INTERNAL MEDICINE | Facility: CLINIC | Age: 71
End: 2021-05-06
Payer: COMMERCIAL

## 2021-05-06 VITALS
OXYGEN SATURATION: 98 % | HEART RATE: 63 BPM | TEMPERATURE: 98.7 F | BODY MASS INDEX: 27.28 KG/M2 | DIASTOLIC BLOOD PRESSURE: 82 MMHG | WEIGHT: 159.8 LBS | HEIGHT: 64 IN | SYSTOLIC BLOOD PRESSURE: 138 MMHG

## 2021-05-06 DIAGNOSIS — N64.4 BREAST PAIN, RIGHT: ICD-10-CM

## 2021-05-06 DIAGNOSIS — M19.042 OSTEOARTHRITIS OF BOTH HANDS, UNSPECIFIED OSTEOARTHRITIS TYPE: ICD-10-CM

## 2021-05-06 DIAGNOSIS — E11.9 DIET-CONTROLLED DIABETES MELLITUS (CMS/HCC): ICD-10-CM

## 2021-05-06 DIAGNOSIS — R10.816 EPIGASTRIC ABDOMINAL TENDERNESS WITHOUT REBOUND TENDERNESS: ICD-10-CM

## 2021-05-06 DIAGNOSIS — E04.1 THYROID NODULE: ICD-10-CM

## 2021-05-06 DIAGNOSIS — C44.91 BASAL CELL CARCINOMA (BCC), UNSPECIFIED SITE: ICD-10-CM

## 2021-05-06 DIAGNOSIS — Z12.11 SCREENING FOR COLON CANCER: ICD-10-CM

## 2021-05-06 DIAGNOSIS — E78.5 HYPERLIPIDEMIA, UNSPECIFIED HYPERLIPIDEMIA TYPE: ICD-10-CM

## 2021-05-06 DIAGNOSIS — I25.10 CHRONIC CORONARY ARTERY DISEASE: Primary | ICD-10-CM

## 2021-05-06 DIAGNOSIS — R39.15 URINARY URGENCY: ICD-10-CM

## 2021-05-06 DIAGNOSIS — M85.80 OSTEOPENIA, UNSPECIFIED LOCATION: ICD-10-CM

## 2021-05-06 DIAGNOSIS — I10 ESSENTIAL HYPERTENSION: ICD-10-CM

## 2021-05-06 DIAGNOSIS — M19.041 OSTEOARTHRITIS OF BOTH HANDS, UNSPECIFIED OSTEOARTHRITIS TYPE: ICD-10-CM

## 2021-05-06 LAB
BACTERIA URNS QL MICRO: ABNORMAL /HPF
BILIRUB UR QL STRIP.AUTO: 1 MG/DL
CLARITY UR REFRACT.AUTO: CLEAR
COLOR UR AUTO: ABNORMAL
GLUCOSE UR STRIP.AUTO-MCNC: NEGATIVE MG/DL
HGB UR QL STRIP.AUTO: NEGATIVE
HYALINE CASTS #/AREA URNS LPF: ABNORMAL /LPF
KETONES UR STRIP.AUTO-MCNC: ABNORMAL MG/DL
LEUKOCYTE ESTERASE UR QL STRIP.AUTO: NEGATIVE
NITRITE UR QL STRIP.AUTO: NEGATIVE
PH UR STRIP.AUTO: 5.5 [PH]
PROT UR QL STRIP.AUTO: NEGATIVE
RBC #/AREA URNS HPF: ABNORMAL /HPF
SP GR UR REFRACT.AUTO: 1.02
SQUAMOUS URNS QL MICRO: 1 /HPF
UROBILINOGEN UR STRIP-ACNC: 0.2 EU/DL
WBC #/AREA URNS HPF: ABNORMAL /HPF

## 2021-05-06 PROCEDURE — 3008F BODY MASS INDEX DOCD: CPT | Performed by: INTERNAL MEDICINE

## 2021-05-06 PROCEDURE — 81001 URINALYSIS AUTO W/SCOPE: CPT | Performed by: INTERNAL MEDICINE

## 2021-05-06 PROCEDURE — 99214 OFFICE O/P EST MOD 30 MIN: CPT | Performed by: INTERNAL MEDICINE

## 2021-05-06 RX ORDER — FLUTICASONE PROPIONATE 50 MCG
SPRAY, SUSPENSION (ML) NASAL DAILY PRN
COMMUNITY
Start: 2021-03-16 | End: 2024-11-04 | Stop reason: SDUPTHER

## 2021-05-06 ASSESSMENT — ENCOUNTER SYMPTOMS
JOINT SWELLING: 0
ABDOMINAL PAIN: 0
MYALGIAS: 0
ABDOMINAL DISTENTION: 0
COUGH: 0
BLOOD IN STOOL: 0
CHILLS: 0
HEADACHES: 0
SHORTNESS OF BREATH: 0
DYSURIA: 0
DIZZINESS: 0
NAUSEA: 0
FATIGUE: 0
BACK PAIN: 0
DECREASED CONCENTRATION: 0
FREQUENCY: 0
DIARRHEA: 1
ARTHRALGIAS: 0
BRUISES/BLEEDS EASILY: 0
FEVER: 0
ACTIVITY CHANGE: 0
PALPITATIONS: 0
SORE THROAT: 0
CONSTIPATION: 0
HEMATURIA: 0
APPETITE CHANGE: 0
NERVOUS/ANXIOUS: 0
DYSPHORIC MOOD: 0
UNEXPECTED WEIGHT CHANGE: 0
CHEST TIGHTNESS: 0
RHINORRHEA: 0
WHEEZING: 0
DIAPHORESIS: 0

## 2021-05-06 NOTE — PROGRESS NOTES
Subjective    Follow up  Patient ID: Forrest Mueller is a 70 y.o. female.    HPI  Patient presents for a follow up appointment. Patient reports some right breast pain. Patient has intentionally lost 13 pounds. Patient complains of constant postnasal drip. Patient reports some diarrhea - manages with metamucil. Patient complains of urinary urgency.     Essential Hypertension.   Monitor response to therapy.    Type 2 Diabetes Mellitus  Monitor response to therapy    Hyperlipidemia   Monitor response to therapy    The following have been reviewed and updated as appropriate in this visit:  Tobacco  Allergies  Meds  Problems  Med Hx  Surg Hx  Fam Hx         Allergies   Allergen Reactions   • No Known Allergies        Current Outpatient Medications   Medication Sig Dispense Refill   • calcium carbonate/vitamin D3 (CALCIUM 500 WITH D ORAL)      • celecoxib (celeBREX) 100 mg capsule TAKE 1 CAPSULE TWICE A  capsule 3   • ezetimibe (ZETIA) 10 mg tablet TAKE 1 TABLET DAILY 90 tablet 3   • fluticasone propionate (FLONASE) 50 mcg/actuation nasal spray      • hydrochlorothiazide (HYDRODIURIL) 25 mg tablet TAKE 1 TABLET DAILY 90 tablet 3   • metoprolol succinate XL (TOPROL-XL) 100 mg 24 hr tablet TAKE 1 TABLET DAILY 90 tablet 3   • pantoprazole (PROTONIX) 40 mg EC tablet TAKE 1 TABLET TWICE A  tablet 3   • PARoxetine CR (PAXIL-CR) 12.5 mg 24 hr tablet TAKE 1 TABLET DAILY 90 tablet 3   • ramipriL (ALTACE) 10 mg capsule TAKE 2 CAPSULES ONCE DAILY 180 capsule 3   • rosuvastatin (CRESTOR) 40 mg tablet TAKE 1 TABLET DAILY 90 tablet 3     No current facility-administered medications for this visit.       Past Medical History:   Diagnosis Date   • Basal cell carcinoma    • Depression    • Diverticulitis of colon    • Endometrioma    • Hypertension    • Lipid disorder    • Osteoarthritis    • Thyroid nodule      Family History   Problem Relation Age of Onset   • Breast cancer Biological Mother 80   •  Hypertension Biological Mother    • Stroke Biological Mother    • Colon cancer Biological Father    • Hypertension Biological Father    • Stroke Paternal Grandmother    • Colon cancer Father's Brother    • Colon cancer Cousin    • Stroke Father's Sister      Social History     Socioeconomic History   • Marital status:      Spouse name: Not on file   • Number of children: Not on file   • Years of education: Not on file   • Highest education level: Not on file   Occupational History   • Not on file   Tobacco Use   • Smoking status: Never Smoker   • Smokeless tobacco: Never Used   Substance and Sexual Activity   • Alcohol use: Not on file   • Drug use: Not on file   • Sexual activity: Not on file   Other Topics Concern   • Not on file   Social History Narrative   • Not on file     Social Determinants of Health     Financial Resource Strain:    • Difficulty of Paying Living Expenses:    Food Insecurity:    • Worried About Running Out of Food in the Last Year:    • Ran Out of Food in the Last Year:    Transportation Needs:    • Lack of Transportation (Medical):    • Lack of Transportation (Non-Medical):    Physical Activity:    • Days of Exercise per Week:    • Minutes of Exercise per Session:    Stress:    • Feeling of Stress :    Social Connections:    • Frequency of Communication with Friends and Family:    • Frequency of Social Gatherings with Friends and Family:    • Attends [employer] Services:    • Active Member of Clubs or Organizations:    • Attends Club or Organization Meetings:    • Marital Status:    Intimate Partner Violence:    • Fear of Current or Ex-Partner:    • Emotionally Abused:    • Physically Abused:    • Sexually Abused:        Review of Systems   Constitutional: Negative for activity change, appetite change, chills, diaphoresis, fatigue, fever and unexpected weight change.   HENT: Positive for postnasal drip. Negative for congestion, ear pain, rhinorrhea and sore throat.    Eyes: Negative  "for visual disturbance.   Respiratory: Negative for cough, chest tightness, shortness of breath and wheezing.         Right breast pain   Cardiovascular: Negative for chest pain, palpitations and leg swelling.   Gastrointestinal: Positive for diarrhea. Negative for abdominal distention, abdominal pain, blood in stool, constipation and nausea.   Genitourinary: Positive for urgency. Negative for dysuria, frequency and hematuria.   Musculoskeletal: Negative for arthralgias, back pain, joint swelling and myalgias.   Skin: Negative for rash.   Neurological: Negative for dizziness and headaches.   Hematological: Does not bruise/bleed easily.   Psychiatric/Behavioral: Negative for decreased concentration and dysphoric mood. The patient is not nervous/anxious.        Objective     Visit Vitals  /82 (BP Location: Left upper arm, Patient Position: Sitting)   Pulse 63   Temp 37.1 °C (98.7 °F) (Temporal)   Ht 1.613 m (5' 3.5\")   Wt 72.5 kg (159 lb 12.8 oz)   LMP  (LMP Unknown)   SpO2 98%   BMI 27.86 kg/m²     BP Readings from Last 3 Encounters:   05/06/21 138/82   12/14/20 128/72   10/01/20 124/82     Wt Readings from Last 3 Encounters:   05/06/21 72.5 kg (159 lb 12.8 oz)   12/14/20 78.1 kg (172 lb 3.2 oz)   10/01/20 78.2 kg (172 lb 6.4 oz)       Physical Exam  Vitals and nursing note reviewed.   Constitutional:       General: She is not in acute distress.     Appearance: She is well-developed.   HENT:      Head: Normocephalic and atraumatic.      Right Ear: External ear normal.      Left Ear: External ear normal.      Mouth/Throat:      Pharynx: No oropharyngeal exudate.   Eyes:      Pupils: Pupils are equal, round, and reactive to light.   Neck:      Thyroid: Thyromegaly (nodular goiter right greater than left) present.      Vascular: No JVD.      Trachea: No tracheal deviation.   Cardiovascular:      Rate and Rhythm: Normal rate and regular rhythm.      Pulses: Normal pulses.           Carotid pulses are 2+ on the " right side and 2+ on the left side.       Dorsalis pedis pulses are 2+ on the right side and 2+ on the left side.        Posterior tibial pulses are 2+ on the right side and 2+ on the left side.      Heart sounds: Normal heart sounds, S1 normal and S2 normal. No murmur heard.   No S3 or S4 sounds.       Comments: No abdominal bruit  Pulmonary:      Effort: Pulmonary effort is normal. No respiratory distress.      Breath sounds: Normal breath sounds. No stridor. No wheezing or rales.   Chest:      Breasts:         Right: No mass, nipple discharge or skin change.         Left: No mass, nipple discharge or skin change.      Comments: Breasts with no masses, no nipple discharge, no skin retraction, no axillary nodes bilaterally.   Right breast pain  Abdominal:      General: Bowel sounds are normal. There is no distension.      Palpations: Abdomen is soft. There is no mass.      Tenderness: There is abdominal tenderness in the epigastric area. There is no guarding. Negative signs include Keller's sign.   Musculoskeletal:         General: Normal range of motion.   Lymphadenopathy:      Cervical: No cervical adenopathy.      Upper Body:      Right upper body: No supraclavicular or axillary adenopathy.      Left upper body: No supraclavicular or axillary adenopathy.   Skin:     General: Skin is warm and dry.   Neurological:      Mental Status: She is alert and oriented to person, place, and time.   Psychiatric:         Behavior: Behavior normal.         Lab Results   Component Value Date    WBC 6.82 09/29/2020    HGB 13.7 09/29/2020    HCT 45.4 (H) 09/29/2020    MCV 93.0 09/29/2020     09/29/2020     Lab Results   Component Value Date    GLUCOSE 104 (H) 09/29/2020    CALCIUM 9.8 09/29/2020     09/29/2020    K 4.5 09/29/2020    CO2 26 09/29/2020     09/29/2020    BUN 22 (H) 09/29/2020    CREATININE 0.9 09/29/2020         Assessment/Plan   Problem List Items Addressed This Visit        Circulatory     Chronic coronary artery disease - Primary    Essential hypertension       Musculoskeletal    Osteoarthritis    Osteopenia       Endocrine/Metabolic    Hyperlipidemia    Thyroid nodule    Diet-controlled diabetes mellitus (CMS/HCC)       Other    Basal cell carcinoma      Other Visit Diagnoses     Urinary urgency        Relevant Orders    Urinalysis with Reflex Culture (ED and Outpatient only)    Breast pain, right        Relevant Orders    BI DIAGNOSTIC MAMMOGRAM BILATERAL (TOMOSYNTHESIS)    ULTRASOUND BREAST LIMITED RIGHT    Epigastric abdominal tenderness without rebound tenderness        Relevant Orders    ULTRASOUND ABDOMINAL AORTA    Comprehensive metabolic panel    CBC and Differential    Amylase    Lipase        1. Chronic coronary artery disease  Asymptomatic.    2. Essential hypertension  Good control current regimen.    3. Diet-controlled diabetes mellitus (CMS/HCC)  A1c at goal.    4. Hyperlipidemia, unspecified hyperlipidemia type  LDL at goal at 85.    5. Osteoarthritis of both hands, unspecified osteoarthritis type  Pt cont to stay active.     6. Osteopenia, unspecified location  Continue to monitor bone density at appropriate intervals.    7. Thyroid nodule  No change in clinical exam of the thyroid.  Ultrasound due.    8. Basal cell carcinoma (BCC), unspecified site  Patient up-to-date with dermatologic evaluation.    9. Urinary urgency  Check urinalysis.  Patient willing to try oxybutynin 5 mg at at bedtime for the pelvic floor dysfunction.  Side effects discussed.  - Urinalysis with Reflex Culture (ED and Outpatient only)    10. Breast pain, right  Normal breast exam, except for reproducible pain in the right retroareolar area.  - BI DIAGNOSTIC MAMMOGRAM BILATERAL (TOMOSYNTHESIS); Future  - ULTRASOUND BREAST LIMITED RIGHT; Future    11. Epigastric abdominal tenderness without rebound tenderness  Epigastric tenderness.  Right over the aorta.  It is not wide.  There are no bruits.  Check amylase and  lipase and other labs today.  - ULTRASOUND ABDOMINAL AORTA; Future  - Comprehensive metabolic panel  - CBC and Differential  - Amylase  - Lipase        By signing my name below, I, Marcia Alejo, attest that this documentation has been prepared under the direction and in the presence of Sierra Montanez,   5/6/2021 10:00 AM  Sierra Montanez, DO  5/6/2021     I, Sierra Montanez, personally performed the services described in this documentation, as documented by the scribe in my presence, and it is both accurate and complete.  5/6/2021 10:02 AM     May 25, 2021 Addendum:   Call to patient this evening to discuss her concerns.  After reading her most recent portal message, and also reviewing the office note, I realized that I misinterpreted patient's complaint.  I thought that we were discussing urgency urinary, and not rectal.  After discussion, plan is as follows:  1.  Patient should not use the oxybutynin.  It will not help rectal urgency.  2.  We had discussed how her irritable bowel had improved with the Metamucil.  When she drinks coffee in the morning, and sometimes even when she smells it, she gets a sudden urge to evacuate.  I did not recognize the time of her evaluation that this was her main concern.  She had seen GI approximately 2 years ago.  He had treated her for traveler's diarrhea.  Again, what helped was patient using Metamucil to add bulk to the stool.  3.  Patient has not had any back injuries, I do not see any MRIs of the spine in her medical record, she does not have paresthesias in the legs or perineum, and she does get sensation when she needs to evacuate her bowels.  This is reassuring.  Had I interpreted her concern at the time of the visit, I would have done a rectal exam to assess her sphincter tone.     Plan will be as follows:  Continue with current Metamucil dose.  Stop caffeine in the morning.  We will see if this resolves the problem.  Patient will ultimately need to see  colorectal specialist to assess her sphincter tone.  In the interim, if no resolution, I can have patient come in to do a rectal exam, and manually assess in a generalized way her sphincter tone, and then facilitate evaluation by the colorectal specialist.    I thanked the patient for being so understanding.

## 2021-05-07 LAB
ALBUMIN SERPL-MCNC: 4.7 G/DL (ref 3.8–4.8)
ALBUMIN/GLOB SERPL: 2 {RATIO} (ref 1.2–2.2)
ALP SERPL-CCNC: 67 IU/L (ref 39–117)
ALT SERPL-CCNC: 19 IU/L (ref 0–32)
AMYLASE SERPL-CCNC: 91 U/L (ref 31–110)
AST SERPL-CCNC: 19 IU/L (ref 0–40)
BASOPHILS # BLD AUTO: 0.1 X10E3/UL (ref 0–0.2)
BASOPHILS NFR BLD AUTO: 1 %
BILIRUB SERPL-MCNC: 0.4 MG/DL (ref 0–1.2)
BUN SERPL-MCNC: 27 MG/DL (ref 8–27)
BUN/CREAT SERPL: 25 (ref 12–28)
CALCIUM SERPL-MCNC: 9.9 MG/DL (ref 8.7–10.3)
CHLORIDE SERPL-SCNC: 104 MMOL/L (ref 96–106)
CO2 SERPL-SCNC: 23 MMOL/L (ref 20–29)
CREAT SERPL-MCNC: 1.08 MG/DL (ref 0.57–1)
EOSINOPHIL # BLD AUTO: 0.1 X10E3/UL (ref 0–0.4)
EOSINOPHIL NFR BLD AUTO: 1 %
ERYTHROCYTE [DISTWIDTH] IN BLOOD BY AUTOMATED COUNT: 14.3 % (ref 11.7–15.4)
GLOBULIN SER CALC-MCNC: 2.4 G/DL (ref 1.5–4.5)
GLUCOSE SERPL-MCNC: 98 MG/DL (ref 65–99)
HCT VFR BLD AUTO: 43.8 % (ref 34–46.6)
HGB BLD-MCNC: 14.2 G/DL (ref 11.1–15.9)
IMM GRANULOCYTES # BLD AUTO: 0 X10E3/UL (ref 0–0.1)
IMM GRANULOCYTES NFR BLD AUTO: 1 %
LAB CORP EGFR IF AFRICN AM: 61 ML/MIN/1.73
LAB CORP EGFR IF NONAFRICN AM: 53 ML/MIN/1.73
LIPASE SERPL-CCNC: 30 U/L (ref 14–72)
LYMPHOCYTES # BLD AUTO: 1.6 X10E3/UL (ref 0.7–3.1)
LYMPHOCYTES NFR BLD AUTO: 20 %
MCH RBC QN AUTO: 28.9 PG (ref 26.6–33)
MCHC RBC AUTO-ENTMCNC: 32.4 G/DL (ref 31.5–35.7)
MCV RBC AUTO: 89 FL (ref 79–97)
MONOCYTES # BLD AUTO: 0.8 X10E3/UL (ref 0.1–0.9)
MONOCYTES NFR BLD AUTO: 9 %
NEUTROPHILS # BLD AUTO: 5.7 X10E3/UL (ref 1.4–7)
NEUTROPHILS NFR BLD AUTO: 68 %
PLATELET # BLD AUTO: 291 X10E3/UL (ref 150–450)
POTASSIUM SERPL-SCNC: 5.2 MMOL/L (ref 3.5–5.2)
PROT SERPL-MCNC: 7.1 G/DL (ref 6–8.5)
RBC # BLD AUTO: 4.91 X10E6/UL (ref 3.77–5.28)
SODIUM SERPL-SCNC: 140 MMOL/L (ref 134–144)
WBC # BLD AUTO: 8.3 X10E3/UL (ref 3.4–10.8)

## 2021-05-12 RX ORDER — EZETIMIBE 10 MG/1
TABLET ORAL
Qty: 90 TABLET | Refills: 3 | Status: SHIPPED | OUTPATIENT
Start: 2021-05-12 | End: 2022-09-23 | Stop reason: SDUPTHER

## 2021-05-12 RX ORDER — PAROXETINE HYDROCHLORIDE HEMIHYDRATE 12.5 MG/1
TABLET, FILM COATED, EXTENDED RELEASE ORAL
Qty: 90 TABLET | Refills: 3 | Status: SHIPPED | OUTPATIENT
Start: 2021-05-12 | End: 2021-12-07 | Stop reason: ALTCHOICE

## 2021-05-12 RX ORDER — METOPROLOL SUCCINATE 100 MG/1
TABLET, EXTENDED RELEASE ORAL
Qty: 90 TABLET | Refills: 3 | Status: SHIPPED | OUTPATIENT
Start: 2021-05-12 | End: 2022-05-05 | Stop reason: SDUPTHER

## 2021-05-25 ENCOUNTER — HOSPITAL ENCOUNTER (OUTPATIENT)
Dept: RADIOLOGY | Facility: HOSPITAL | Age: 71
Discharge: HOME | End: 2021-05-25
Attending: INTERNAL MEDICINE
Payer: COMMERCIAL

## 2021-05-25 ENCOUNTER — TELEPHONE (OUTPATIENT)
Dept: INTERNAL MEDICINE | Facility: CLINIC | Age: 71
End: 2021-05-25

## 2021-05-25 DIAGNOSIS — N64.4 BREAST PAIN, RIGHT: ICD-10-CM

## 2021-05-25 PROCEDURE — 77066 DX MAMMO INCL CAD BI: CPT

## 2021-05-25 NOTE — TELEPHONE ENCOUNTER
Call to patient this evening to discuss her concerns.  After reading her most recent portal message, and also reviewing the office note, I realized that I misinterpreted patient's complaint.  I thought that we were discussing urgency urinary, and not rectal.  After discussion, plan is as follows:  1.  Patient should not use the oxybutynin.  It will not help rectal urgency.  2.  We had discussed how her irritable bowel had improved with the Metamucil.  When she drinks coffee in the morning, and sometimes even when she smells it, she gets a sudden urge to evacuate.  I did not recognize the time of her evaluation that this was her main concern.  She had seen GI approximately 2 years ago.  He had treated her for traveler's diarrhea.  Again, what helped was patient using Metamucil to add bulk to the stool.  3.  Patient has not had any back injuries, I do not see any MRIs of the spine in her medical record, she does not have paresthesias in the legs or perineum, and she does get sensation when she needs to evacuate her bowels.  This is reassuring.  Had I interpreted her concern at the time of the visit, I would have done a rectal exam to assess her sphincter tone.     Plan will be as follows:  Continue with current Metamucil dose.  Stop caffeine in the morning.  We will see if this resolves the problem.  Patient will ultimately need to see colorectal specialist to assess her sphincter tone.  In the interim, if no resolution, I can have patient come in to do a rectal exam, and manually assess in a generalized way her sphincter tone, and then facilitate evaluation by the colorectal specialist.    I thanked the patient for being so understanding.

## 2021-05-26 ENCOUNTER — HOSPITAL ENCOUNTER (OUTPATIENT)
Dept: RADIOLOGY | Facility: CLINIC | Age: 71
Discharge: HOME | End: 2021-05-26
Attending: INTERNAL MEDICINE
Payer: COMMERCIAL

## 2021-05-26 DIAGNOSIS — E04.1 THYROID NODULE: ICD-10-CM

## 2021-05-26 DIAGNOSIS — R10.816 EPIGASTRIC ABDOMINAL TENDERNESS WITHOUT REBOUND TENDERNESS: ICD-10-CM

## 2021-05-26 PROCEDURE — 76536 US EXAM OF HEAD AND NECK: CPT

## 2021-05-26 PROCEDURE — 76775 US EXAM ABDO BACK WALL LIM: CPT

## 2021-06-04 ENCOUNTER — HOSPITAL ENCOUNTER (OUTPATIENT)
Dept: CARDIOLOGY | Facility: HOSPITAL | Age: 71
Discharge: HOME | End: 2021-06-04
Attending: INTERNAL MEDICINE
Payer: COMMERCIAL

## 2021-06-04 DIAGNOSIS — R09.89 DECREASED CAROTID PULSE: ICD-10-CM

## 2021-06-04 LAB
LEFT CCA DIST DIAS: 24.46 CM/S
LEFT CCA DIST SYS: 65.87 CM/S
LEFT CCA MID DIAS: 25.75 CM/S
LEFT CCA MID SYS: 77.52 CM/S
LEFT CCA PROX DIAS: 29.64 CM/S
LEFT CCA PROX SYS: 86.58 CM/S
LEFT ICA DIST DIAS: 36.99 CM/S
LEFT ICA DIST SYS: 87.06 CM/S
LEFT ICA MID DIAS: 33.76 CM/S
LEFT ICA MID SYS: 91.91 CM/S
LEFT ICA PROX DIAS: 24.46 CM/S
LEFT ICA PROX SYS: 71.05 CM/S
LEFT ICA/CCA SYS: 1.4
LT BULB EDV: 23.17 CM/S
LT BULB PSV: 72.34 CM/S
LT ECA PROX EDV: 19.28 CM/S
LT ECA PROX PSV: 87.87 CM/S
LT VERTEBRAL MID EDV: 16.3 CM/S
LT VERTEBRAL MID PSV: 44.77 CM/S
RIGHT CCA DIST DIAS: 20.74 CM/S
RIGHT CCA DIST SYS: 66.39 CM/S
RIGHT CCA MID DIAS: 19.43 CM/S
RIGHT CCA MID SYS: 59.87 CM/S
RIGHT CCA PROX DIAS: 19.43 CM/S
RIGHT CCA PROX SYS: 69 CM/S
RIGHT ECA SYS: 96.38 CM/S
RIGHT ICA DIST DIAS: 19.22 CM/S
RIGHT ICA DIST SYS: 77.37 CM/S
RIGHT ICA MID DIAS: 31.31 CM/S
RIGHT ICA MID SYS: 129.77 CM/S
RIGHT ICA PROX DIAS: 39.23 CM/S
RIGHT ICA PROX SYS: 131.87 CM/S
RIGHT ICA/CCA SYS: 1.99
RT BULB EDV: 20.74 CM/S
RT BULB PSV: 67.69 CM/S
RT ECA PROC EDV: 22.04 CM/S
RT VERTEBRAL MID EDV: 22.45 CM/S
RT VERTEBRAL MID PSV: 70.91 CM/S

## 2021-06-04 PROCEDURE — 93880 EXTRACRANIAL BILAT STUDY: CPT

## 2021-06-04 PROCEDURE — 93880 EXTRACRANIAL BILAT STUDY: CPT | Mod: 26 | Performed by: SURGERY

## 2021-08-10 ENCOUNTER — OFFICE VISIT (OUTPATIENT)
Dept: INTERNAL MEDICINE | Facility: CLINIC | Age: 71
End: 2021-08-10
Payer: COMMERCIAL

## 2021-08-10 VITALS
RESPIRATION RATE: 18 BRPM | BODY MASS INDEX: 28.88 KG/M2 | TEMPERATURE: 98.5 F | DIASTOLIC BLOOD PRESSURE: 82 MMHG | WEIGHT: 163 LBS | SYSTOLIC BLOOD PRESSURE: 132 MMHG | OXYGEN SATURATION: 98 % | HEIGHT: 63 IN | HEART RATE: 60 BPM

## 2021-08-10 DIAGNOSIS — Z13.0 SCREENING, ANEMIA, DEFICIENCY, IRON: ICD-10-CM

## 2021-08-10 DIAGNOSIS — R73.9 HYPERGLYCEMIA: ICD-10-CM

## 2021-08-10 DIAGNOSIS — I10 ESSENTIAL HYPERTENSION: ICD-10-CM

## 2021-08-10 DIAGNOSIS — Z00.00 MEDICARE ANNUAL WELLNESS VISIT, SUBSEQUENT: Primary | ICD-10-CM

## 2021-08-10 DIAGNOSIS — M85.80 OSTEOPENIA, UNSPECIFIED LOCATION: ICD-10-CM

## 2021-08-10 DIAGNOSIS — F32.5 MAJOR DEPRESSIVE DISORDER WITH SINGLE EPISODE, IN FULL REMISSION (CMS/HCC): ICD-10-CM

## 2021-08-10 DIAGNOSIS — Z78.0 MENOPAUSE: ICD-10-CM

## 2021-08-10 DIAGNOSIS — K52.9 COLITIS: ICD-10-CM

## 2021-08-10 DIAGNOSIS — Z13.89 SCREENING FOR HEMATURIA OR PROTEINURIA: ICD-10-CM

## 2021-08-10 DIAGNOSIS — I25.10 CHRONIC CORONARY ARTERY DISEASE: ICD-10-CM

## 2021-08-10 DIAGNOSIS — E55.9 VITAMIN D DEFICIENCY: ICD-10-CM

## 2021-08-10 DIAGNOSIS — E78.5 HYPERLIPIDEMIA, UNSPECIFIED HYPERLIPIDEMIA TYPE: ICD-10-CM

## 2021-08-10 DIAGNOSIS — E04.1 THYROID NODULE: ICD-10-CM

## 2021-08-10 PROBLEM — E11.9 DIET-CONTROLLED DIABETES MELLITUS (CMS/HCC): Status: RESOLVED | Noted: 2020-12-14 | Resolved: 2021-08-10

## 2021-08-10 PROCEDURE — G0439 PPPS, SUBSEQ VISIT: HCPCS | Performed by: INTERNAL MEDICINE

## 2021-08-10 PROCEDURE — 3008F BODY MASS INDEX DOCD: CPT | Performed by: INTERNAL MEDICINE

## 2021-08-10 PROCEDURE — 99214 OFFICE O/P EST MOD 30 MIN: CPT | Mod: 25 | Performed by: INTERNAL MEDICINE

## 2021-08-10 RX ORDER — FAMOTIDINE 40 MG/1
40 TABLET, FILM COATED ORAL NIGHTLY
Qty: 90 TABLET | Refills: 1 | Status: SHIPPED | OUTPATIENT
Start: 2021-08-10 | End: 2022-01-19

## 2021-08-10 ASSESSMENT — ENCOUNTER SYMPTOMS
HEMATURIA: 0
CHILLS: 0
DECREASED CONCENTRATION: 0
JOINT SWELLING: 0
SHORTNESS OF BREATH: 0
FREQUENCY: 0
FATIGUE: 1
ACTIVITY CHANGE: 0
BLOOD IN STOOL: 0
BACK PAIN: 0
DYSURIA: 0
COUGH: 0
MYALGIAS: 0
ARTHRALGIAS: 1
SLEEP DISTURBANCE: 0
ABDOMINAL DISTENTION: 0
UNEXPECTED WEIGHT CHANGE: 0
FEVER: 0
ABDOMINAL PAIN: 0
SORE THROAT: 0
PALPITATIONS: 0
NERVOUS/ANXIOUS: 0
APPETITE CHANGE: 0
DYSPHORIC MOOD: 0
VOMITING: 0
DIZZINESS: 0
DIAPHORESIS: 0
DIARRHEA: 0
RHINORRHEA: 0
HEADACHES: 0
CHEST TIGHTNESS: 0
BRUISES/BLEEDS EASILY: 0
NAUSEA: 0
WHEEZING: 0
CONSTIPATION: 0

## 2021-08-10 ASSESSMENT — MINI COG
COMPLETED: YES
TOTAL SCORE: 5

## 2021-08-10 ASSESSMENT — PATIENT HEALTH QUESTIONNAIRE - PHQ9: SUM OF ALL RESPONSES TO PHQ9 QUESTIONS 1 & 2: 0

## 2021-08-10 NOTE — PROGRESS NOTES
Subjective      MAW     Patient ID: Forrest Mueller is a 70 y.o. female.    HPI     MAW    Discuss bowel habits.     Essential Hypertension.   Monitor response to therapy.;    Review data to date.     The following have been reviewed and updated as appropriate in this visit:  Tobacco  Allergies  Meds  Problems  Med Hx  Surg Hx  Fam Hx         Allergies   Allergen Reactions   • No Known Allergies        Current Outpatient Medications   Medication Sig Dispense Refill   • calcium carbonate/vitamin D3 (CALCIUM 500 WITH D ORAL)      • celecoxib (celeBREX) 100 mg capsule TAKE 1 CAPSULE TWICE A  capsule 3   • ezetimibe (ZETIA) 10 mg tablet TAKE 1 TABLET DAILY 90 tablet 3   • fluticasone propionate (FLONASE) 50 mcg/actuation nasal spray      • hydrochlorothiazide (HYDRODIURIL) 25 mg tablet TAKE 1 TABLET DAILY 90 tablet 3   • hydrocortisone (ANUSOL-HC) 25 mg suppository Insert 1 suppository (25 mg total) into the rectum 2 (two) times a day as needed for hemorrhoids. 20 suppository 0   • metoprolol succinate XL (TOPROL-XL) 100 mg 24 hr tablet TAKE 1 TABLET DAILY 90 tablet 3   • oxybutynin XL (DITROPAN XL) 5 mg 24 hr tablet Take 1 tablet (5 mg total) by mouth nightly. 90 tablet 1   • PARoxetine CR (PAXIL-CR) 12.5 mg 24 hr tablet TAKE 1 TABLET DAILY 90 tablet 3   • ramipriL (ALTACE) 10 mg capsule TAKE 2 CAPSULES ONCE DAILY 180 capsule 3   • rosuvastatin (CRESTOR) 40 mg tablet TAKE 1 TABLET DAILY 90 tablet 3   • famotidine (PEPCID) 40 mg tablet Take 1 tablet (40 mg total) by mouth nightly. 90 tablet 1     No current facility-administered medications for this visit.       Past Medical History:   Diagnosis Date   • Basal cell carcinoma    • Depression    • Diverticulitis of colon    • Endometrioma    • Hypertension    • Lipid disorder    • Osteoarthritis    • Thyroid nodule      Family History   Problem Relation Age of Onset   • Breast cancer Biological Mother 80   • Hypertension Biological Mother    •  Stroke Biological Mother    • Colon cancer Biological Father    • Hypertension Biological Father    • Stroke Paternal Grandmother    • Colon cancer Father's Brother    • Colon cancer Cousin    • Stroke Father's Sister      Social History     Socioeconomic History   • Marital status:      Spouse name: Not on file   • Number of children: Not on file   • Years of education: Not on file   • Highest education level: Not on file   Occupational History   • Not on file   Tobacco Use   • Smoking status: Never Smoker   • Smokeless tobacco: Never Used   Substance and Sexual Activity   • Alcohol use: Not on file   • Drug use: Not on file   • Sexual activity: Not on file   Other Topics Concern   • Not on file   Social History Narrative   • Not on file     Social Determinants of Health     Financial Resource Strain:    • Difficulty of Paying Living Expenses:    Food Insecurity:    • Worried About Running Out of Food in the Last Year:    • Ran Out of Food in the Last Year:    Transportation Needs:    • Lack of Transportation (Medical):    • Lack of Transportation (Non-Medical):    Physical Activity:    • Days of Exercise per Week:    • Minutes of Exercise per Session:    Stress:    • Feeling of Stress :    Social Connections:    • Frequency of Communication with Friends and Family:    • Frequency of Social Gatherings with Friends and Family:    • Attends Christian Services:    • Active Member of Clubs or Organizations:    • Attends Club or Organization Meetings:    • Marital Status:    Intimate Partner Violence:    • Fear of Current or Ex-Partner:    • Emotionally Abused:    • Physically Abused:    • Sexually Abused:        Review of Systems   Constitutional: Positive for fatigue. Negative for activity change, appetite change, chills, diaphoresis, fever and unexpected weight change.        Appropriate for activity level.    HENT: Negative for congestion, ear pain, postnasal drip, rhinorrhea and sore throat.         Less  "symptmatic.    Eyes: Negative for visual disturbance.        Yearly exam due.   Close to cataracts.    Respiratory: Negative for cough, chest tightness, shortness of breath and wheezing.    Cardiovascular: Negative for chest pain, palpitations and leg swelling.   Gastrointestinal: Negative for abdominal distention, abdominal pain, blood in stool, constipation, diarrhea, nausea and vomiting.        Ongoing issue w bowel.   Pt feels it is malabsorption.   Loose stool. Has been treated for travelers diarrhea, avoiding coffee, adding fiber. No change. .  Hemorrhoids have flared up.    Genitourinary: Negative for dysuria, frequency, hematuria and urgency.        Nocturia x 1    Musculoskeletal: Positive for arthralgias. Negative for back pain, joint swelling and myalgias.        Hands are painful, at the base of the 1st metacarpal. Pt sees Dr Shelby Hernandez. Pt had shot, and really used her hands. Pt has a new game plan this time.     Left knee is painful. It shows itself every now and then. Pt sees Dr Galvez at [place]. Pt gets steroid shot with activity, eg, before going skiing. Has a brace. Has not used it yet. The pain shows up on \"steps\".    Skin: Negative for rash.   Neurological: Negative for dizziness and headaches.   Hematological: Does not bruise/bleed easily.   Psychiatric/Behavioral: Negative for decreased concentration, dysphoric mood and sleep disturbance. The patient is not nervous/anxious.        Objective     Visit Vitals  /82   Pulse 60   Temp 36.9 °C (98.5 °F) (Oral)   Resp 18   Ht 1.6 m (5' 3\")   Wt 73.9 kg (163 lb)   LMP  (LMP Unknown)   SpO2 98%   BMI 28.87 kg/m²     BP Readings from Last 3 Encounters:   08/10/21 132/82   05/06/21 138/82   12/14/20 128/72     Wt Readings from Last 3 Encounters:   08/10/21 73.9 kg (163 lb)   05/06/21 72.5 kg (159 lb 12.8 oz)   12/14/20 78.1 kg (172 lb 3.2 oz)       Physical Exam  Vitals and nursing note reviewed.   Constitutional:       General: She is not in " acute distress.     Appearance: Normal appearance. She is well-developed.   HENT:      Head: Normocephalic and atraumatic.      Right Ear: Tympanic membrane, ear canal and external ear normal.      Left Ear: Ear canal and external ear normal.      Mouth/Throat:      Pharynx: No oropharyngeal exudate.   Eyes:      Conjunctiva/sclera: Conjunctivae normal.      Pupils: Pupils are equal, round, and reactive to light.   Neck:      Thyroid: No thyromegaly.      Vascular: No JVD.      Trachea: No tracheal deviation.   Cardiovascular:      Rate and Rhythm: Normal rate and regular rhythm.      Pulses:           Carotid pulses are 2+ on the right side and 2+ on the left side.       Dorsalis pedis pulses are 2+ on the right side and 2+ on the left side.        Posterior tibial pulses are 2+ on the right side and 2+ on the left side.      Heart sounds: S1 normal and S2 normal. No murmur heard.   No S3 or S4 sounds.       Comments: Decreased right carotid pulse.   Pulmonary:      Effort: Pulmonary effort is normal. No respiratory distress.      Breath sounds: Normal breath sounds. No stridor. No decreased breath sounds, wheezing, rhonchi or rales.   Abdominal:      General: Bowel sounds are normal. There is no distension or abdominal bruit.      Palpations: Abdomen is soft. There is no mass.      Tenderness: There is no abdominal tenderness.   Musculoskeletal:         General: No swelling.   Lymphadenopathy:      Cervical: No cervical adenopathy.      Right cervical: No superficial or posterior cervical adenopathy.     Left cervical: No superficial or posterior cervical adenopathy.      Upper Body:      Right upper body: No supraclavicular adenopathy.      Left upper body: No supraclavicular adenopathy.   Skin:     General: Skin is warm and dry.      Findings: No rash.      Comments: Sun affect skin       Neurological:      Mental Status: She is alert and oriented to person, place, and time.      Deep Tendon Reflexes:       Reflex Scores:       Patellar reflexes are 2+ on the right side and 2+ on the left side.     Comments: Cranial nerves II through XII grossly intact, motor +5/5 bilaterally upper extremity and lower extremity, cerebellar without ataxia finger to nose, heel to shin.  Deep tendon reflexes 2+ bilateral upper extremity and lower extremity.     Psychiatric:         Speech: Speech normal.         Behavior: Behavior normal.         Lab Results   Component Value Date    WBC 8.3 05/06/2021    HGB 14.2 05/06/2021    HCT 43.8 05/06/2021    MCV 89 05/06/2021     05/06/2021     Lab Results   Component Value Date    GLUCOSE 98 05/06/2021    CALCIUM 9.8 09/29/2020     05/06/2021    K 5.2 05/06/2021    CO2 23 05/06/2021     05/06/2021    BUN 27 05/06/2021    CREATININE 1.08 (H) 05/06/2021         Assessment/Plan   Problem List Items Addressed This Visit        Circulatory    Chronic coronary artery disease    Essential hypertension       Musculoskeletal    Osteopenia       Endocrine/Metabolic    Hyperlipidemia    Relevant Orders    Lipid panel    Thyroid nodule    Relevant Orders    TSH 3rd Generation       Other    Major depressive disorder with single episode, in full remission (CMS/[facility])    Medicare annual wellness visit, subsequent - Primary      Other Visit Diagnoses     Colitis        Relevant Orders    Fecal Fat Qualitative    Stool culture    Fecal leukocytes    Ova and parasite screen    Clostridium difficile tox screen    Fecal Immunochemical(MEDICARE SCREENING)    Celiac reflex panel    C-reactive protein    Sedimentation rate    Menopause        Relevant Orders    DEXA BONE DENSITY    Hyperglycemia        Relevant Orders    Comprehensive metabolic panel    Hemoglobin A1c    Screening, anemia, deficiency, iron        Relevant Orders    CBC and Differential    Screening for hematuria or proteinuria        Relevant Orders    Urinalysis with microscopic    Vitamin D deficiency        Relevant Orders     Vitamin D 1,25-Dihydroxy          Sierra Montanez DO  8/10/2021  Subjective     Forrest Mueller is a 70 y.o. female who presents for a subsequent annual wellness visit.     Patient Care Team:  Sierra Montanez DO as PCP - General    Comprehensive Medical and Social History  Patient Active Problem List   Diagnosis   • Blood glucose abnormal   • Chronic coronary artery disease   • Basal cell carcinoma   • Chicken pox   • Cerebral vascular disease   • Diverticulosis   • Endometriosis   • Family history of malignant neoplasm of breast   • Family history of cardiovascular disease   • Goiter   • Hemorrhoids   • History of non anemic vitamin B12 deficiency   • Essential hypertension   • Hyperlipidemia   • Osteoarthritis   • Osteopenia   • Thyroid nodule   • Peptic ulcer disease   • Breast pain, left   • Major depressive disorder with single episode, in full remission (CMS/[facility])   • Medicare annual wellness visit, subsequent     Past Medical History:   Diagnosis Date   • Basal cell carcinoma    • Depression    • Diverticulitis of colon    • Endometrioma    • Hypertension    • Lipid disorder    • Osteoarthritis    • Thyroid nodule      Past Surgical History:   Procedure Laterality Date   • DILATION AND CURETTAGE OF UTERUS     • HYSTERECTOMY     • LAPAROTOMY OOPHERECTOMY     • WRIST SURGERY Right      Allergies   Allergen Reactions   • No Known Allergies      Current Outpatient Medications   Medication Sig Dispense Refill   • calcium carbonate/vitamin D3 (CALCIUM 500 WITH D ORAL)      • celecoxib (celeBREX) 100 mg capsule TAKE 1 CAPSULE TWICE A  capsule 3   • ezetimibe (ZETIA) 10 mg tablet TAKE 1 TABLET DAILY 90 tablet 3   • fluticasone propionate (FLONASE) 50 mcg/actuation nasal spray      • hydrochlorothiazide (HYDRODIURIL) 25 mg tablet TAKE 1 TABLET DAILY 90 tablet 3   • hydrocortisone (ANUSOL-HC) 25 mg suppository Insert 1 suppository (25 mg total) into the rectum 2 (two) times a day as needed for  "hemorrhoids. 20 suppository 0   • metoprolol succinate XL (TOPROL-XL) 100 mg 24 hr tablet TAKE 1 TABLET DAILY 90 tablet 3   • oxybutynin XL (DITROPAN XL) 5 mg 24 hr tablet Take 1 tablet (5 mg total) by mouth nightly. 90 tablet 1   • PARoxetine CR (PAXIL-CR) 12.5 mg 24 hr tablet TAKE 1 TABLET DAILY 90 tablet 3   • ramipriL (ALTACE) 10 mg capsule TAKE 2 CAPSULES ONCE DAILY 180 capsule 3   • rosuvastatin (CRESTOR) 40 mg tablet TAKE 1 TABLET DAILY 90 tablet 3   • famotidine (PEPCID) 40 mg tablet Take 1 tablet (40 mg total) by mouth nightly. 90 tablet 1     No current facility-administered medications for this visit.     Social History     Tobacco Use   • Smoking status: Never Smoker   • Smokeless tobacco: Never Used   Substance Use Topics   • Alcohol use: Not on file   • Drug use: Not on file     Family History   Problem Relation Age of Onset   • Breast cancer Biological Mother 80   • Hypertension Biological Mother    • Stroke Biological Mother    • Colon cancer Biological Father    • Hypertension Biological Father    • Stroke Paternal Grandmother    • Colon cancer Father's Brother    • Colon cancer Cousin    • Stroke Father's Sister        Objective   Vitals  Vitals:    08/10/21 0826 08/10/21 0904   BP: (!) 150/78 132/82   BP Location: Left upper arm    Patient Position: Sitting    Pulse: 60    Resp: 18    Temp: 36.9 °C (98.5 °F)    TempSrc: Oral    SpO2: 98%    Weight: 73.9 kg (163 lb)    Height: 1.6 m (5' 3\")      Body mass index is 28.87 kg/m².    Advanced Care Plan   Pt and her  do have advanced directives.                                      PHQ2  Will the patient answer the depression questions?: YesPHQ2 SCREEN NEGATIVE.    Little interest or pleasure in doing things: Not at all   Feeling down, depressed, or hopeless: Not at all   Depression Risk: 0                                             Mini Cog  Completed: Yes  Score: 5  Result: Negative      Get Up and Go   PASS     STEADI Falls Risk   NO FALLS.  "                                                             Hearing and Vision Screening  No exam data present  See HRA for relevant hearing screening response.    Assessment/Plan   Diagnoses and all orders for this visit:    Medicare annual wellness visit, subsequent (Primary)    Essential hypertension    Colitis  -     Fecal Fat Qualitative; Future  -     Stool culture; Future  -     Fecal leukocytes; Future  -     Ova and parasite screen; Future  -     Clostridium difficile tox screen; Future  -     Fecal Immunochemical(MEDICARE SCREENING); Future  -     Celiac reflex panel; Future  -     C-reactive protein; Future  -     Sedimentation rate; Future    Chronic coronary artery disease    Osteopenia, unspecified location    Thyroid nodule  -     TSH 3rd Generation; Future    Hyperlipidemia, unspecified hyperlipidemia type  -     Lipid panel; Future    Major depressive disorder with single episode, in full remission (CMS/[facility])    Menopause  -     DEXA BONE DENSITY; Future    Hyperglycemia  -     Comprehensive metabolic panel; Future  -     Hemoglobin A1c; Future    Screening, anemia, deficiency, iron  -     CBC and Differential; Future    Screening for hematuria or proteinuria  -     Urinalysis with microscopic; Future    Vitamin D deficiency  -     Vitamin D 1,25-Dihydroxy; Future    Other orders  -     famotidine (PEPCID) 40 mg tablet; Take 1 tablet (40 mg total) by mouth nightly.    1. Medicare annual wellness visit, subsequent    MA W completed today.  All categories of the Medicare annual well addressed, and entered in the EMR.  Please see attachments.    Patient and her  do have advanced directives.    2. Essential hypertension  Good control current regimen.    3. Chronic coronary artery disease  Asymptomatic.  Blood pressure is good.  Continue to aggressively modify risk factors.    4. Osteopenia, unspecified location  Patient's most recent bone density done in November 2019 shows patient to have a  stable lumbar spine and -1.2, and a stable left hip femoral neck -0.9,As well as a stable right hip at -0.8.      5. Thyroid nodule  Most recent ultrasound of thyroid reviewed.  It was just done in recent months.  TSH will be due before next visit.    6. Hyperlipidemia, unspecified hyperlipidemia type  Continue current dose of statin.  Lipid panel before next visit.    7. Major depressive disorder with single episode, in full remission (CMS/HCC)  Affect bright on current regimen and patient is doing well.    8. Menopause  Bone density ordered.    9. Colitis  Patient is describing colitis.  We discussed the different subsets of colitis.  She was given a requisition to obtain containers to collect her stool.  We will screen for celiac.  Will check TSH.  Patient feeling is related to malabsorption.  Will check fecal fat.  Patient is up-to-date with colonoscopy.  It was last done in 2019.      See Patient Instructions (the written plan) which was given to the patient for PPPS and health risk factors with interventions.

## 2021-08-10 NOTE — PATIENT INSTRUCTIONS
Your Personalized Prevention Plan Services (PPPS)    Preventive Services Checklist (Assumes Average Risk Unless Otherwise Noted):    Health Maintenance Topics with due status: Overdue       Topic Date Due    Diabetic Foot Exam Never done    Varicella Vaccines Never done    Zoster Vaccine 12/31/2020    Annual Falls Risk Screening Never done    Medicare Annual Wellness Visit 06/30/2021    Influenza Vaccine 08/01/2021     Health Maintenance Topics with due status: Not Due       Topic Last Completion Date    DTaP, Tdap, and Td Vaccines 09/23/2015    Colonoscopy 07/23/2019    DEXA Scan 11/12/2019    Mammogram 05/25/2021     Health Maintenance Topics with due status: Completed       Topic Last Completion Date    Pneumococcal 11/28/2017    Pneumococcal (65 years and older) 11/28/2017    Hepatitis C Screening 07/17/2018    COVID-19 Vaccine 02/11/2021     Health Maintenance Topics with due status: Aged Out       Topic Date Due    Meningococcal ACWY Aged Out    HIB Vaccines Aged Out    IPV Vaccines Aged Out    HPV Vaccines Aged Out       You May Be Eligible for These Additional Preventive Services   (Assumes Average Risk Unless Otherwise Noted)  Diabetes Screening Any 1 risk factor: hypertension, dyslipidemia, obesity, high glucose; or Any 2 risk factors: >=66yo, overweight, family history diabetes (covered every 6 months)   Hepatitis C Screening Any 1 risk factor: 1) blood transfusion before 1992,   2) current or past injection drug use (annually for high risk; if born between [birth year]03, see above for status).   Vaccine: Hepatitis B As necessary if at-risk: hemophilia, ESRD, diabetes, living with individual infected with hep B, healthcare worker with frequent contact with blood/bodily fluids (series covered once)   Sexually Transmitted Diseases (STDs) As necessary chlamydia, gonorrhea, syphilis, hepatitis B (covered annually)  HIV if any 1 risk factor present: 1) <16yo or >66yo and at increased risk or  2) 15-64yo and ask for it (covered annually)   Lung Cancer Screening Low dose chest CT if all 3 risk factors: 1) 55-78yo, 2) smoker or quit within last 15y, 3) >=30 pack years (covered annually).  No results found for this or any previous visit.       Cholesterol Screening Both risk factors: 1) >=21yo and 2)  increased risk coronary artery disease (covered every 5 years).     Breast Cancer Screening Covered once 35-38yo, annually >=41yo (if >=51yo, see above for status).     Glaucoma Screening Any 1 risk factor: 1) diabetes, 2) family history glaucoma, 3)  >=51yo, 4)  American >=64yo (covered annually)           Health Risk Factors with Personalized Education:  ----------------------------------------------------------------------------------------------------------------------  Stress management:  Understanding Your Stress  · Think about how you know when you’re stressed.  · Think about how your thoughts or behaviors are different when you’re stressed.  · Think about what triggers stress for you.  · Think about how you handle stress, and whether you’re making unhealthy choices in response to stress (like smoking, drinking alcohol or overeating).  Managing Stress  · Take a break from the stressful situation.  · Reduce your stress levels by exercising, talking with family/friends, ensuring adequate sleep.  · Consider meditation or yoga.  · Make sure you plan time to do things you enjoy.  · Let your PCP know if you’re having problems limiting your stress.  ----------------------------------------------------------------------------------------------------------------------  Controlling Your Blood Pressure  · Maintain a normal weight (body mass index between 18.5 and 24.9).  · Eat more fruit, vegetables and low-fat dairy.  · Eat less saturated fat and total fat.  · Lower your sodium (salt) intake.  Try to stay under 1500 mg per day, but if you cannot get your intake to be that low, at least  lower it by 1000 mg.  · Stay active.  Try to get at least 90 to 150 minutes of exercise per week.  Try brisk walking, swimming, bicycling or dancing.  · Limit alcohol intake.  When you do consume alcohol, drink no more than 1 drink per day.  · If you have been prescribed medication, take it regularly and exactly as prescribed.  Let your PCP know if you have any problems or questions about your medication.  · Check your blood pressure at home or at the store.  Write down your readings and share them with your PCP  ----------------------------------------------------------------------------------------------------------------------  Controlling Your Diabetes  · Stress can raise your blood sugar.  Learn what causes your stress.  Learn to lower your stress by spending time with family and friends, exercising, deep breathing, trying meditation or yoga, enjoying hobbies and getting enough rest.  Let your PCP know if you’re having problems limiting your stress.  · Maintain a healthy weight by balancing your diet and exercise.  · Choose foods that are lower in calories, saturated fat, trans fat, sugar and salt.  Eat foods with more fiber, like whole grain cereals, bread, crackers, rice or pasta.  Choose to eat fruit, vegetables, and low-fat or fat-free/skim dairy.  · Reduce the portion size of your meals.  Make half of your meal vegetables and fruit, and divide the other half between lean protein and whole grains.  · Drink water instead of juice and regular soda.  · Spend at least some time being active on most days of the week.  · Work to increase your muscle strength at least twice per week.  Try things like light weights, stretch bands, yoga, heavy gardening (digging, planting with tools) or push-ups.  · If you have been prescribed medication, take it regularly and exactly as prescribed.  Let your PCP know if you have any problems or questions about your medication.  · If you have been asked to check your blood sugar,  write down your readings and share them with your PCP  ----------------------------------------------------------------------------------------------------------------------  Controlling Your Cholesterol  · Reduce the amount of saturated and trans fat in your diet.  Limit intake of red meat.  Consume only low-fat or non-fat/skim dairy.  Limit fried food.  Cook with vegetable oils.  · Reduce your intake of sugary foods, sugary drinks and alcohol.  · Eat a diet high in fruit, vegetables and whole grains.  · Get protein from fish, poultry and a small portion of nuts.  · Stay active.  Try to get at least 90 to 150 minutes of exercise per week.  Try brisk walking, swimming, bicycling or dancing.  · Maintain a healthy weight by balancing your diet and exercise.  · If you have been prescribed medication, take it regularly and exactly as prescribed. Let your PCP know if you have any problems or questions about your medication.  · It’s important to know your cholesterol numbers.  When recommended by your PCP, get the cholesterol blood test.  ---------------------------------------------------------------------------------------------------------------------   Controlling Your Osteoporosis, Strengthening Your Bones  · Try to get at least 90 to 150 minutes of weight-bearing exercise per week.  · Ensure intake of at least 1200mg of calcium per day.  Eat foods high in calcium like milk and other dairy, green vegetables, fruit, canned fish with soft and edible bones, nuts, calcium-set tofu.  Some foods are calcium-fortified, like bread, cereal, fruit juices and mineral water.  · Help your body make vitamin D by getting 10-15 minutes per day of sunlight.    · Ensure intake of at least 600IU of vitamin D per day.  Eat foods high in vitamin D like oily fish (salmon, sardines, mackerel) and eggs.  Some foods are fortified with vitamin D, like dairy and cereals.  · Avoid high amounts of caffeine and salt, since they can cause the body  to loose calcium.  · Limit alcohol intake, since it is associated with weaker bones and is associated with falls and fractures.  · Limit intake of fizzy drinks.  · If you have been prescribed medication, take it regularly and exactly as prescribed.  Let your PCP know if you have any problems or questions about your medication  ----------------------------------------------------------------------------------------------------------------------  Controlling Your Osteopenia, Strengthening Your Bones  · Try to get at least 90 to 150 minutes of weight-bearing exercise per week.  · Ensure intake of at least 1200mg of calcium per day.  Eat foods high in calcium like milk and other dairy, green vegetables, fruit, canned fish with soft and edible bones, nuts, calcium-set tofu.  Some foods are calcium-fortified, like bread, cereal, fruit juices and mineral water.  · Help your body make vitamin D by getting 10-15 minutes per day of sunlight.    · Ensure intake of at least 600IU of vitamin D per day.  Eat foods high in vitamin D like oily fish (salmon, sardines, mackerel) and eggs.  Some foods are fortified with vitamin D, like dairy and cereals.  · Avoid high amounts of caffeine and salt, since they can cause the body to loose calcium.  · Limit alcohol intake, since it is associated with weaker bones and is associated with falls and fractures.  · Limit intake of fizzy drinks.  · If you have been prescribed medication, take it regularly and exactly as prescribed.  Let your PCP know if you have any problems or questions about your medication  ----------------------------------------------------------------------------------------------------------------------  Reducing Your Risk of Falls  · Tell your PCP if any of your medications make you feel tired, dizzy, lightheaded or off-balance.  · Maintain coordination, flexibility and balance by ensuring regular physical activity.  · Limit alcohol intake to 1 drink per day.  Consider  avoiding all alcohol intake.  · Ensure good vision.  Visit an ophthalmologist or optometrist regularly for vision screening or to make sure your glasses / contact lens prescription is correct.  If you need glasses or contacts, wear them.  When you get new glasses or contacts, take time to get used to them.  Do not wear sunglasses or tinted lenses when indoors.  · Ensure good hearing.  Have your hearing checked if you are having trouble hearing, or family and friends think you cannot hear them.  If you need a hearing aid, be sure it fits well and wear it.  · Get enough rest.  Ensure about 7-9 hours of sleep every day.  · Get up slowly from your bed or chairs.  Do not start walking until you are sure you feel steady.  · Wear non-skid, rubber-soled, low-heeled shoes.  Do not walk in socks, or in shoes and slippers with smooth soles.  · If your PCP or therapist recommends using a cane or walker, use it regularly.  · Make your home safer.  Increase lighting throughout the house, especially at the top and bottom of stairs.  Ensure lighting is easily turned on when getting up in the middle of the night.  Make sure there are two secure rails on all stairs.  Install grab bars in the bathtub / shower and near the toilet.  Consider using a shower chair and / or a hand-held shower.  · Spread sand or salt on icy surfaces.  Beware of wet surfaces, which can be icy.  · Tell your PCP if you have fallen.

## 2021-08-11 ENCOUNTER — APPOINTMENT (OUTPATIENT)
Dept: LAB | Facility: CLINIC | Age: 71
End: 2021-08-11
Attending: INTERNAL MEDICINE
Payer: COMMERCIAL

## 2021-08-11 DIAGNOSIS — K52.9 COLITIS: ICD-10-CM

## 2021-08-11 LAB — WBC STL QL MB STN: NORMAL

## 2021-08-11 PROCEDURE — G0328 FECAL BLOOD SCRN IMMUNOASSAY: HCPCS | Performed by: INTERNAL MEDICINE

## 2021-08-11 PROCEDURE — 87045 FECES CULTURE AEROBIC BACT: CPT

## 2021-08-11 PROCEDURE — 82705 FATS/LIPIDS FECES QUAL: CPT

## 2021-08-11 PROCEDURE — 89055 LEUKOCYTE ASSESSMENT FECAL: CPT

## 2021-08-11 PROCEDURE — 87177 OVA AND PARASITES SMEARS: CPT

## 2021-08-12 LAB
FAT STL SUDAN IV STN: NORMAL
O+P SPEC MICRO: NORMAL
SERVICE CMNT-IMP: NORMAL

## 2021-08-18 ENCOUNTER — LAB REQUISITION (OUTPATIENT)
Dept: LAB | Facility: HOSPITAL | Age: 71
End: 2021-08-18
Attending: INTERNAL MEDICINE
Payer: COMMERCIAL

## 2021-08-18 DIAGNOSIS — K52.9 NONINFECTIVE GASTROENTERITIS AND COLITIS, UNSPECIFIED: ICD-10-CM

## 2021-08-18 LAB — HEMOCCULT STL QL IA: NEGATIVE

## 2021-08-19 LAB — BACTERIA STL CULT: NORMAL

## 2021-10-25 RX ORDER — HYDROCHLOROTHIAZIDE 25 MG/1
TABLET ORAL
Qty: 90 TABLET | Refills: 3 | Status: SHIPPED | OUTPATIENT
Start: 2021-10-25 | End: 2022-10-20

## 2021-10-25 RX ORDER — RAMIPRIL 10 MG/1
CAPSULE ORAL
Qty: 180 CAPSULE | Refills: 3 | Status: SHIPPED | OUTPATIENT
Start: 2021-10-25 | End: 2022-10-20

## 2021-12-07 ENCOUNTER — OFFICE VISIT (OUTPATIENT)
Dept: INTERNAL MEDICINE | Facility: CLINIC | Age: 71
End: 2021-12-07
Payer: COMMERCIAL

## 2021-12-07 VITALS
HEIGHT: 63 IN | DIASTOLIC BLOOD PRESSURE: 90 MMHG | SYSTOLIC BLOOD PRESSURE: 160 MMHG | OXYGEN SATURATION: 97 % | BODY MASS INDEX: 29.55 KG/M2 | HEART RATE: 65 BPM | TEMPERATURE: 98.7 F | WEIGHT: 166.8 LBS

## 2021-12-07 DIAGNOSIS — E78.5 HYPERLIPIDEMIA, UNSPECIFIED HYPERLIPIDEMIA TYPE: ICD-10-CM

## 2021-12-07 DIAGNOSIS — M54.32 SCIATICA, LEFT SIDE: ICD-10-CM

## 2021-12-07 DIAGNOSIS — I10 HYPERTENSION, UNSPECIFIED TYPE: Primary | ICD-10-CM

## 2021-12-07 DIAGNOSIS — I77.9 RIGHT-SIDED CAROTID ARTERY DISEASE, UNSPECIFIED TYPE (CMS/HCC): ICD-10-CM

## 2021-12-07 DIAGNOSIS — F41.9 ANXIETY: ICD-10-CM

## 2021-12-07 PROCEDURE — 3008F BODY MASS INDEX DOCD: CPT | Performed by: INTERNAL MEDICINE

## 2021-12-07 PROCEDURE — 93000 ELECTROCARDIOGRAM COMPLETE: CPT | Performed by: INTERNAL MEDICINE

## 2021-12-07 PROCEDURE — 99215 OFFICE O/P EST HI 40 MIN: CPT | Performed by: INTERNAL MEDICINE

## 2021-12-07 RX ORDER — AMLODIPINE BESYLATE 2.5 MG/1
2.5 TABLET ORAL DAILY
Qty: 30 TABLET | Refills: 1 | Status: SHIPPED | OUTPATIENT
Start: 2021-12-07 | End: 2021-12-07 | Stop reason: SDUPTHER

## 2021-12-07 RX ORDER — AMLODIPINE BESYLATE 2.5 MG/1
2.5 TABLET ORAL DAILY
Qty: 30 TABLET | Refills: 1 | Status: SHIPPED | OUTPATIENT
Start: 2021-12-07 | End: 2022-01-31

## 2021-12-07 RX ORDER — ESCITALOPRAM OXALATE 10 MG/1
10 TABLET ORAL DAILY
Qty: 90 TABLET | Refills: 0 | Status: SHIPPED | OUTPATIENT
Start: 2021-12-07 | End: 2022-05-17 | Stop reason: ALTCHOICE

## 2021-12-07 RX ORDER — AMLODIPINE BESYLATE 2.5 MG/1
2.5 TABLET ORAL DAILY
Qty: 30 TABLET | Refills: 1 | Status: SHIPPED | OUTPATIENT
Start: 2021-12-07 | End: 2021-12-07

## 2021-12-07 NOTE — PROGRESS NOTES
Subjective      Follow up     Patient ID: Forrest Mueller is a 71 y.o. female.    HPI     Essential Hypertension.   Monitor response to therapy  It has been elevated.  No chnge in diet or exercise.   Pt attributes it to stressors.     Hyperlipidemia   Monitor response to therapy    Sciatica type pain  It is on the left  No real back pain             The following have been reviewed and updated as appropriate in this visit:          Allergies   Allergen Reactions   • No Known Allergies        Current Outpatient Medications   Medication Sig Dispense Refill   • calcium carbonate/vitamin D3 (CALCIUM 500 WITH D ORAL)      • celecoxib (celeBREX) 100 mg capsule TAKE 1 CAPSULE TWICE A  capsule 3   • ezetimibe (ZETIA) 10 mg tablet TAKE 1 TABLET DAILY 90 tablet 3   • famotidine (PEPCID) 40 mg tablet Take 1 tablet (40 mg total) by mouth nightly. 90 tablet 1   • fluticasone propionate (FLONASE) 50 mcg/actuation nasal spray      • hydrochlorothiazide 25 mg tablet TAKE 1 TABLET DAILY 90 tablet 3   • hydrocortisone (ANUSOL-HC) 25 mg suppository Insert 1 suppository (25 mg total) into the rectum 2 (two) times a day as needed for hemorrhoids. 20 suppository 0   • metoprolol succinate XL (TOPROL-XL) 100 mg 24 hr tablet TAKE 1 TABLET DAILY 90 tablet 3   • PARoxetine CR (PAXIL-CR) 12.5 mg 24 hr tablet TAKE 1 TABLET DAILY 90 tablet 3   • ramipriL 10 mg capsule TAKE 2 CAPSULES ONCE DAILY 180 capsule 3   • rosuvastatin (CRESTOR) 40 mg tablet TAKE 1 TABLET DAILY 90 tablet 3   • oxybutynin XL (DITROPAN XL) 5 mg 24 hr tablet Take 1 tablet (5 mg total) by mouth nightly. 90 tablet 1     No current facility-administered medications for this visit.       Past Medical History:   Diagnosis Date   • Basal cell carcinoma    • Depression    • Diverticulitis of colon    • Endometrioma    • Hypertension    • Lipid disorder    • Osteoarthritis    • Thyroid nodule      Family History   Problem Relation Age of Onset   • Breast cancer  Biological Mother 80   • Hypertension Biological Mother    • Stroke Biological Mother    • Colon cancer Biological Father    • Hypertension Biological Father    • Stroke Paternal Grandmother    • Colon cancer Father's Brother    • Colon cancer Cousin    • Stroke Father's Sister      Social History     Socioeconomic History   • Marital status:      Spouse name: Not on file   • Number of children: Not on file   • Years of education: Not on file   • Highest education level: Not on file   Occupational History   • Not on file   Tobacco Use   • Smoking status: Never Smoker   • Smokeless tobacco: Never Used   Substance and Sexual Activity   • Alcohol use: Not on file   • Drug use: Not on file   • Sexual activity: Not on file   Other Topics Concern   • Not on file   Social History Narrative   • Not on file     Social Determinants of Health     Financial Resource Strain: Not on file   Food Insecurity: Not on file   Transportation Needs: Not on file   Physical Activity: Not on file   Stress: Not on file   Social Connections: Not on file   Intimate Partner Violence: Not on file   Housing Stability: Not on file       Review of Systems   Constitutional: Negative for activity change, appetite change, chills, diaphoresis, fatigue, fever and unexpected weight change.   HENT: Negative for congestion, ear pain, postnasal drip, rhinorrhea and sore throat.    Eyes: Negative for visual disturbance.   Respiratory: Negative for cough, chest tightness, shortness of breath and wheezing.    Cardiovascular: Negative for chest pain, palpitations and leg swelling.   Gastrointestinal: Negative for abdominal distention, abdominal pain, blood in stool, constipation, diarrhea, nausea and vomiting.        Stool pattern unchanged.    Genitourinary: Negative for dysuria, frequency, hematuria and urgency.   Musculoskeletal: Negative for arthralgias, back pain, joint swelling and myalgias.        Left buttock pain    Skin: Negative for rash.  "  Neurological: Negative for dizziness and headaches.   Hematological: Does not bruise/bleed easily.   Psychiatric/Behavioral: Negative for decreased concentration, dysphoric mood and sleep disturbance. The patient is nervous/anxious.        Objective     Visit Vitals  BP (!) 160/90 (BP Location: Right upper arm, Patient Position: Sitting)   Pulse 65   Temp 37.1 °C (98.7 °F) (Oral)   Ht 1.6 m (5' 3\")   Wt 75.7 kg (166 lb 12.8 oz)   LMP  (LMP Unknown)   SpO2 97%   BMI 29.55 kg/m²     BP Readings from Last 3 Encounters:   12/07/21 (!) 160/90   08/10/21 132/82   05/06/21 138/82     Wt Readings from Last 3 Encounters:   12/07/21 75.7 kg (166 lb 12.8 oz)   08/10/21 73.9 kg (163 lb)   05/06/21 72.5 kg (159 lb 12.8 oz)       Physical Exam  Nursing note reviewed.   Constitutional:       General: She is not in acute distress.     Appearance: Normal appearance. She is well-developed. She is not ill-appearing.   HENT:      Head: Normocephalic and atraumatic.      Right Ear: Tympanic membrane, ear canal and external ear normal.      Left Ear: Tympanic membrane, ear canal and external ear normal.      Mouth/Throat:      Pharynx: No oropharyngeal exudate.   Eyes:      General: No scleral icterus.     Conjunctiva/sclera: Conjunctivae normal.      Pupils: Pupils are equal, round, and reactive to light.   Neck:      Thyroid: No thyromegaly.      Vascular: No JVD.      Trachea: No tracheal deviation.   Cardiovascular:      Rate and Rhythm: Normal rate and regular rhythm.      Pulses: Normal pulses.           Carotid pulses are 2+ on the right side and 2+ on the left side.       Dorsalis pedis pulses are 2+ on the right side and 2+ on the left side.        Posterior tibial pulses are 2+ on the right side and 2+ on the left side.      Heart sounds: Normal heart sounds, S1 normal and S2 normal.     No S3 or S4 sounds.      Comments: Slight decreased carotid upstroke.  Pulmonary:      Effort: Pulmonary effort is normal. No respiratory " distress.      Breath sounds: No stridor. No decreased breath sounds, wheezing, rhonchi or rales.   Chest:   Breasts:      Right: No supraclavicular adenopathy.      Left: No supraclavicular adenopathy.       Abdominal:      General: Bowel sounds are normal. There is no distension or abdominal bruit.      Palpations: Abdomen is soft. There is no mass.      Tenderness: There is no abdominal tenderness.      Hernia: No hernia is present.   Musculoskeletal:         General: No swelling.      Comments: No reproducible tenderness over the lumbar line to percussion or palpation.  Good range of motion of the left hip without pain.  Negative sign of 4.   Lymphadenopathy:      Cervical: No cervical adenopathy.      Right cervical: No superficial or posterior cervical adenopathy.     Left cervical: No superficial or posterior cervical adenopathy.      Upper Body:      Right upper body: No supraclavicular adenopathy.      Left upper body: No supraclavicular adenopathy.   Skin:     General: Skin is warm and dry.      Findings: No rash.   Neurological:      Mental Status: She is alert and oriented to person, place, and time.      Deep Tendon Reflexes:      Reflex Scores:       Patellar reflexes are 2+ on the right side and 2+ on the left side.     Comments: Straight leg raise negative bilaterally.  Motor strength is 5/5 bilateral lower extremity.   Psychiatric:         Speech: Speech normal.         Behavior: Behavior normal.         Lab Results   Component Value Date    WBC 8.3 05/06/2021    HGB 14.2 05/06/2021    HCT 43.8 05/06/2021    MCV 89 05/06/2021     05/06/2021     Lab Results   Component Value Date    GLUCOSE 98 05/06/2021    CALCIUM 9.8 09/29/2020     05/06/2021    K 5.2 05/06/2021    CO2 23 05/06/2021     05/06/2021    BUN 27 05/06/2021    CREATININE 1.08 (H) 05/06/2021         Assessment/Plan   Problem List Items Addressed This Visit     None      Visit Diagnoses     Hypertension, unspecified type     -  Primary    Relevant Orders    ECG 12 LEAD OFFICE PERFORMED (Completed)        1. Hypertension, unspecified type  Suboptimal control current regimen.  We have maxed out on the ACE inhibitor, want to increase the beta-blocker.  She is also at maximum dose of diuretic.  Add low-dose of ACE inhibitor.  Patient does have [place].  She will be leaving soon.  She will check in with the nurse practitioner there to have blood pressure check in 4 to 6 weeks.  EKG personally reviewed by me.  - ECG 12 LEAD OFFICE PERFORMED  - amLODIPine 2.5 mg tablet; Take 1 tablet (2.5 mg total) by mouth daily.  Dispense: 30 tablet; Refill: 1    2. Sciatica, left side  No signs of radiculopathy.  Patient has been using ibuprofen.  It has been sparingly.  Patient will use the ibuprofen 200 mg, 2 tabs with each meal to quiet down the symptoms.  Glucocorticoids have been helpful, as reported by patients.  I am okay with the ibuprofen as we are also going to more aggressively treat the blood pressure.    3. Hyperlipidemia, unspecified hyperlipidemia type  There is lab requisition created.  Will clarify if you have time to get it done before leaving for [place].      4. Right carotid artery disease, unspecified type (CMS/[facility])  Ultrasound most recently done in June shows 50 to 69% occlusion on the right.  I will recheck the patient to see if she is on a baby aspirin.  I will also again clarify when she is going to get her lab work done.    5. Anxiety  Patient has some challenges currently.  She is on the paroxetine, and has been for a while with good results for the depression.  However, I think switching to Lexapro would be helpful as far as treating the anxiety compounded.  It is an SSRI, so patient can go from 1 tab to the next.  She is open to the idea.  Changes made today.      Addendum: Reviewing meds more thoroughly, I see that patient also has the Celebrex.  It to clarify if she is taking it.  I would not want her to take both the  Celebrex and the ibuprofen.  I will send patient a portal message this evening, and reach out to her tomorrow.    I spent 40 minutes on this date of service performing the following activities: obtaining history, performing examination, entering orders, preparing for visit, obtaining / reviewing records and providing counseling and education.          Sierra Montanez, DO  12/7/2021

## 2021-12-08 ASSESSMENT — ENCOUNTER SYMPTOMS
BRUISES/BLEEDS EASILY: 0
ABDOMINAL DISTENTION: 0
RHINORRHEA: 0
DYSURIA: 0
DIZZINESS: 0
DECREASED CONCENTRATION: 0
CONSTIPATION: 0
FEVER: 0
ACTIVITY CHANGE: 0
MYALGIAS: 0
HEADACHES: 0
SHORTNESS OF BREATH: 0
DIAPHORESIS: 0
DIARRHEA: 0
NERVOUS/ANXIOUS: 1
WHEEZING: 0
SORE THROAT: 0
FATIGUE: 0
PALPITATIONS: 0
UNEXPECTED WEIGHT CHANGE: 0
NAUSEA: 0
DYSPHORIC MOOD: 0
CHEST TIGHTNESS: 0
APPETITE CHANGE: 0
CHILLS: 0
BLOOD IN STOOL: 0
VOMITING: 0
BACK PAIN: 0
FREQUENCY: 0
ABDOMINAL PAIN: 0
COUGH: 0
JOINT SWELLING: 0
HEMATURIA: 0
SLEEP DISTURBANCE: 0
ARTHRALGIAS: 0

## 2021-12-14 ENCOUNTER — DOCUMENTATION (OUTPATIENT)
Dept: INTERNAL MEDICINE | Facility: CLINIC | Age: 71
End: 2021-12-14
Payer: COMMERCIAL

## 2021-12-14 NOTE — PROGRESS NOTES
Gavi Rivera MA has completed a chart review for Forrest Mueller.     Care Gap Source: [employer]    Care Gap(s) Identified: Diabetic Eye Exam    Chart Review Completed: Yes    Pt recently seen by PCP. Pt outreach not needed at this time.

## 2022-01-03 RX ORDER — CELECOXIB 100 MG/1
CAPSULE ORAL
Qty: 180 CAPSULE | Refills: 3 | Status: SHIPPED | OUTPATIENT
Start: 2022-01-03 | End: 2023-12-05

## 2022-01-19 RX ORDER — FAMOTIDINE 40 MG/1
TABLET, FILM COATED ORAL
Qty: 90 TABLET | Refills: 3 | Status: SHIPPED | OUTPATIENT
Start: 2022-01-19 | End: 2023-01-16

## 2022-01-25 ENCOUNTER — NEW PATIENT (OUTPATIENT)
Dept: URBAN - METROPOLITAN AREA CLINIC 47 | Facility: CLINIC | Age: 72
End: 2022-01-25

## 2022-01-25 DIAGNOSIS — H52.7: ICD-10-CM

## 2022-01-25 DIAGNOSIS — H25.813: ICD-10-CM

## 2022-01-25 PROCEDURE — 92004 COMPRE OPH EXAM NEW PT 1/>: CPT

## 2022-01-25 PROCEDURE — 92015 DETERMINE REFRACTIVE STATE: CPT

## 2022-01-25 ASSESSMENT — VISUAL ACUITY
OS_SC: J6
OD_SC: J12
OD_SC: 20/40+2
OS_BAT: 20/20
OS_SC: 20/50
OU_SC: J3
OU_SC: 20/30-2
OD_BAT: 20/20

## 2022-01-25 ASSESSMENT — KERATOMETRY
OS_AXISANGLE2_DEGREES: 165
OD_AXISANGLE2_DEGREES: 172
OS_K2POWER_DIOPTERS: 44.50
OD_AXISANGLE_DEGREES: 82
OS_AXISANGLE_DEGREES: 75
OD_K2POWER_DIOPTERS: 44.50
OD_K1POWER_DIOPTERS: 43.00
OS_K1POWER_DIOPTERS: 42.75

## 2022-01-25 ASSESSMENT — TONOMETRY
OS_IOP_MMHG: 13
OD_IOP_MMHG: 10

## 2022-02-17 DIAGNOSIS — I10 HYPERTENSION, UNSPECIFIED TYPE: ICD-10-CM

## 2022-02-17 RX ORDER — AMLODIPINE BESYLATE 2.5 MG/1
2.5 TABLET ORAL
Qty: 90 TABLET | Refills: 1 | Status: SHIPPED | OUTPATIENT
Start: 2022-02-17 | End: 2022-05-17 | Stop reason: ALTCHOICE

## 2022-05-05 RX ORDER — METOPROLOL SUCCINATE 100 MG/1
100 TABLET, EXTENDED RELEASE ORAL
Qty: 90 TABLET | Refills: 3 | Status: SHIPPED | OUTPATIENT
Start: 2022-05-05 | End: 2023-05-15

## 2022-05-05 RX ORDER — ROSUVASTATIN CALCIUM 40 MG/1
40 TABLET, COATED ORAL
Qty: 90 TABLET | Refills: 3 | Status: SHIPPED | OUTPATIENT
Start: 2022-05-05 | End: 2023-07-14 | Stop reason: SDUPTHER

## 2022-05-11 ENCOUNTER — TRANSCRIBE ORDERS (OUTPATIENT)
Dept: SCHEDULING | Age: 72
End: 2022-05-11

## 2022-05-11 ENCOUNTER — TELEPHONE (OUTPATIENT)
Dept: INTERNAL MEDICINE | Facility: CLINIC | Age: 72
End: 2022-05-11
Payer: COMMERCIAL

## 2022-05-11 DIAGNOSIS — Z12.31 ENCOUNTER FOR SCREENING MAMMOGRAM FOR MALIGNANT NEOPLASM OF BREAST: Primary | ICD-10-CM

## 2022-05-11 NOTE — TELEPHONE ENCOUNTER
PT called stating she wants a medical records request for started for NP Naina Comer in [place] apart of [employer].

## 2022-05-17 ENCOUNTER — OFFICE VISIT (OUTPATIENT)
Dept: INTERNAL MEDICINE | Facility: CLINIC | Age: 72
End: 2022-05-17
Payer: COMMERCIAL

## 2022-05-17 VITALS
WEIGHT: 170.4 LBS | DIASTOLIC BLOOD PRESSURE: 78 MMHG | TEMPERATURE: 98.6 F | SYSTOLIC BLOOD PRESSURE: 128 MMHG | OXYGEN SATURATION: 98 % | HEIGHT: 63 IN | HEART RATE: 68 BPM | BODY MASS INDEX: 30.19 KG/M2

## 2022-05-17 DIAGNOSIS — Z78.0 MENOPAUSE: ICD-10-CM

## 2022-05-17 DIAGNOSIS — M54.32 SCIATICA OF LEFT SIDE: ICD-10-CM

## 2022-05-17 DIAGNOSIS — M62.89 PELVIC FLOOR DYSFUNCTION IN FEMALE: Primary | ICD-10-CM

## 2022-05-17 DIAGNOSIS — I10 ESSENTIAL HYPERTENSION: ICD-10-CM

## 2022-05-17 DIAGNOSIS — Z12.31 ENCOUNTER FOR SCREENING MAMMOGRAM FOR MALIGNANT NEOPLASM OF BREAST: ICD-10-CM

## 2022-05-17 DIAGNOSIS — F32.5 MAJOR DEPRESSIVE DISORDER WITH SINGLE EPISODE, IN FULL REMISSION (CMS/HCC): ICD-10-CM

## 2022-05-17 DIAGNOSIS — I77.9 RIGHT-SIDED CAROTID ARTERY DISEASE, UNSPECIFIED TYPE (CMS/HCC): ICD-10-CM

## 2022-05-17 DIAGNOSIS — R09.89 DECREASED PULSE: ICD-10-CM

## 2022-05-17 DIAGNOSIS — E78.5 HYPERLIPIDEMIA, UNSPECIFIED HYPERLIPIDEMIA TYPE: ICD-10-CM

## 2022-05-17 PROCEDURE — 99215 OFFICE O/P EST HI 40 MIN: CPT | Performed by: INTERNAL MEDICINE

## 2022-05-17 PROCEDURE — 3008F BODY MASS INDEX DOCD: CPT | Performed by: INTERNAL MEDICINE

## 2022-05-17 RX ORDER — PANTOPRAZOLE SODIUM 40 MG/1
40 TABLET, DELAYED RELEASE ORAL DAILY
Qty: 90 TABLET | Refills: 1 | Status: SHIPPED | OUTPATIENT
Start: 2022-05-17 | End: 2022-11-03 | Stop reason: SDUPTHER

## 2022-05-17 RX ORDER — PAROXETINE HYDROCHLORIDE HEMIHYDRATE 12.5 MG/1
12.5 TABLET, FILM COATED, EXTENDED RELEASE ORAL EVERY MORNING
Qty: 90 TABLET | Refills: 1 | Status: SHIPPED | OUTPATIENT
Start: 2022-05-17 | End: 2023-07-14 | Stop reason: SDUPTHER

## 2022-05-17 RX ORDER — NAPROXEN SODIUM 220 MG/1
81 TABLET, FILM COATED ORAL DAILY
Qty: 90 TABLET | Refills: 1
Start: 2022-05-17 | End: 2023-04-13 | Stop reason: SDUPTHER

## 2022-05-17 ASSESSMENT — ENCOUNTER SYMPTOMS
COUGH: 0
RHINORRHEA: 0
ARTHRALGIAS: 0
DIAPHORESIS: 0
ACTIVITY CHANGE: 0
APPETITE CHANGE: 0
PALPITATIONS: 0
NAUSEA: 0
FEVER: 0
DIARRHEA: 0
BLOOD IN STOOL: 0
CHEST TIGHTNESS: 0
HEMATURIA: 0
VOMITING: 0
ABDOMINAL PAIN: 0
FATIGUE: 0
CHILLS: 0
DIZZINESS: 0
MYALGIAS: 0
SHORTNESS OF BREATH: 0
SORE THROAT: 0
HEADACHES: 0
CONSTIPATION: 0
FREQUENCY: 0
WHEEZING: 0
ABDOMINAL DISTENTION: 0
DYSURIA: 0
UNEXPECTED WEIGHT CHANGE: 0
JOINT SWELLING: 0

## 2022-05-17 NOTE — PROGRESS NOTES
Subjective      Follow up      Patient ID: Forrest Mueller is a 71 y.o. female.    HPI     Pt has wintered in [place]    Essential Hypertension.   Monitor response to therapy.  Pt has been feeling well  Pt has not been on the .     Hyperlipidemia   Monitor response to therapy    Stress incontinence  Pt states that totally dressed, turned on water, and started to urinate. It has happened w coughing and sneezing as well.   Pt prefers not to use a pad.       Sciatica continues   Pt had seen a health care provider   Pt has left sided pain to the ankle  Pt did get PT in [place]  Not able to continue.     Patient has known hypoglycemia.  She has never crossed the diabetes line.  And her numbers most recently done continue to support that.      The following have been reviewed and updated as appropriate in this visit:   Allergies  Meds  Problems           Allergies   Allergen Reactions   • No Known Allergies        Current Outpatient Medications   Medication Sig Dispense Refill   • aspirin 81 mg chewable tablet Take 1 tablet (81 mg total) by mouth daily. 90 tablet 1   • calcium carbonate/vitamin D3 (CALCIUM 500 WITH D ORAL)      • celecoxib 100 mg capsule TAKE 1 CAPSULE TWICE A  capsule 3   • ezetimibe (ZETIA) 10 mg tablet TAKE 1 TABLET DAILY 90 tablet 3   • famotidine 40 mg tablet TAKE 1 TABLET NIGHTLY 90 tablet 3   • fluticasone propionate (FLONASE) 50 mcg/actuation nasal spray      • hydrochlorothiazide 25 mg tablet TAKE 1 TABLET DAILY 90 tablet 3   • hydrocortisone (ANUSOL-HC) 25 mg suppository Insert 1 suppository (25 mg total) into the rectum 2 (two) times a day as needed for hemorrhoids. 20 suppository 0   • metoprolol succinate XL (TOPROL-XL) 100 mg 24 hr tablet Take 1 tablet (100 mg total) by mouth once daily. 90 tablet 3   • ramipriL 10 mg capsule TAKE 2 CAPSULES ONCE DAILY 180 capsule 3   • rosuvastatin (CRESTOR) 40 mg tablet Take 1 tablet (40 mg total) by mouth once daily. 90 tablet 3   • amLODIPine  (NORVASC) 2.5 mg tablet Take 1 tablet (2.5 mg total) by mouth once daily. 90 tablet 1     No current facility-administered medications for this visit.       Past Medical History:   Diagnosis Date   • Basal cell carcinoma    • Depression    • Diverticulitis of colon    • Endometrioma    • Hypertension    • Lipid disorder    • Osteoarthritis    • Thyroid nodule      Family History   Problem Relation Age of Onset   • Breast cancer Biological Mother 80   • Hypertension Biological Mother    • Stroke Biological Mother    • Colon cancer Biological Father    • Hypertension Biological Father    • Stroke Paternal Grandmother    • Colon cancer Father's Brother    • Colon cancer Cousin    • Stroke Father's Sister      Social History     Socioeconomic History   • Marital status:      Spouse name: Not on file   • Number of children: Not on file   • Years of education: Not on file   • Highest education level: Not on file   Occupational History   • Not on file   Tobacco Use   • Smoking status: Never Smoker   • Smokeless tobacco: Never Used   Substance and Sexual Activity   • Alcohol use: Not on file   • Drug use: Not on file   • Sexual activity: Not on file   Other Topics Concern   • Not on file   Social History Narrative   • Not on file     Social Determinants of Health     Financial Resource Strain: Not on file   Food Insecurity: Not on file   Transportation Needs: Not on file   Physical Activity: Not on file   Stress: Not on file   Social Connections: Not on file   Intimate Partner Violence: Not on file   Housing Stability: Not on file       Review of Systems   Constitutional: Negative for activity change, appetite change, chills, diaphoresis, fatigue, fever and unexpected weight change.   HENT: Negative for congestion, ear pain, postnasal drip, rhinorrhea and sore throat.    Eyes: Negative for visual disturbance.   Respiratory: Negative for cough, chest tightness, shortness of breath and wheezing.    Cardiovascular:  "Negative for chest pain, palpitations and leg swelling.   Gastrointestinal: Negative for abdominal distention, abdominal pain, blood in stool, constipation, diarrhea, nausea and vomiting.   Genitourinary: Positive for urgency. Negative for dysuria, frequency and hematuria.        Incontinence. See HPI    Musculoskeletal: Negative for arthralgias, joint swelling and myalgias.        Left sciatica    Skin: Negative for rash.        Up to date w derm    Neurological: Negative for dizziness and headaches.       Objective     Visit Vitals  /80 (BP Location: Right upper arm, Patient Position: Sitting)   Pulse 68   Temp 37 °C (98.6 °F) (Oral)   Ht 1.6 m (5' 3\")   Wt 77.3 kg (170 lb 6.4 oz)   LMP  (LMP Unknown)   SpO2 98%   BMI 30.19 kg/m²     BP Readings from Last 3 Encounters:   05/17/22 130/80   12/07/21 (!) 160/90   08/10/21 132/82     Wt Readings from Last 3 Encounters:   05/17/22 77.3 kg (170 lb 6.4 oz)   12/07/21 75.7 kg (166 lb 12.8 oz)   08/10/21 73.9 kg (163 lb)       Physical Exam  Constitutional:       General: She is not in acute distress.     Appearance: Normal appearance. She is well-developed.   HENT:      Head: Normocephalic and atraumatic.      Right Ear: Tympanic membrane, ear canal and external ear normal.      Left Ear: Tympanic membrane, ear canal and external ear normal.      Mouth/Throat:      Pharynx: No oropharyngeal exudate.   Eyes:      General: No scleral icterus.     Conjunctiva/sclera: Conjunctivae normal.      Pupils: Pupils are equal, round, and reactive to light.   Neck:      Thyroid: No thyromegaly.      Vascular: No JVD.      Trachea: No tracheal deviation.      Comments: Thyroid slightly enlarged and irregular.  No dominant nodule.  Cardiovascular:      Rate and Rhythm: Regular rhythm. Bradycardia present.      Pulses: Normal pulses.           Carotid pulses are 2+ on the right side and 2+ on the left side.       Dorsalis pedis pulses are 2+ on the right side and 2+ on the left " side.        Posterior tibial pulses are 2+ on the right side and 2+ on the left side.      Heart sounds: Normal heart sounds, S1 normal and S2 normal. No murmur heard.    No S3 or S4 sounds.      Comments: Decreased right carotid pulse with no bruit bilaterally  Pulmonary:      Effort: Pulmonary effort is normal. No respiratory distress.      Breath sounds: Normal breath sounds. No stridor. No decreased breath sounds, wheezing, rhonchi or rales.   Chest:   Breasts:      Right: No supraclavicular adenopathy.      Left: No supraclavicular adenopathy.        Comments: Breasts with no masses, no nipple discharge, no skin retraction, no axillary nodes bilaterally.     Abdominal:      General: Bowel sounds are normal. There is no distension or abdominal bruit.      Palpations: Abdomen is soft. There is no mass.      Tenderness: There is no abdominal tenderness.   Musculoskeletal:         General: No swelling.   Lymphadenopathy:      Cervical: No cervical adenopathy.      Right cervical: No superficial or posterior cervical adenopathy.     Left cervical: No superficial or posterior cervical adenopathy.      Upper Body:      Right upper body: No supraclavicular adenopathy.      Left upper body: No supraclavicular adenopathy.   Skin:     General: Skin is warm and dry.      Findings: No rash.   Neurological:      Mental Status: She is alert and oriented to person, place, and time.      Deep Tendon Reflexes:      Reflex Scores:       Patellar reflexes are 2+ on the right side and 2+ on the left side.  Psychiatric:         Speech: Speech normal.         Behavior: Behavior normal.         Lab Results   Component Value Date    WBC 8.3 05/06/2021    HGB 14.2 05/06/2021    HCT 43.8 05/06/2021    MCV 89 05/06/2021     05/06/2021     Lab Results   Component Value Date    GLUCOSE 98 05/06/2021    CALCIUM 9.8 09/29/2020     05/06/2021    K 5.2 05/06/2021    CO2 23 05/06/2021     05/06/2021    BUN 27 05/06/2021     CREATININE 1.08 (H) 05/06/2021         Assessment/Plan   Problem List Items Addressed This Visit        Circulatory    Right-sided carotid artery disease, unspecified type (CMS/HCC)       Other    Major depressive disorder with single episode, in full remission (CMS/HCC)      Other Visit Diagnoses     Pelvic floor dysfunction in female    -  Primary    Relevant Orders    Ambulatory referral to Urogynecology        1. Pelvic floor dysfunction in female  Patient is describing pelvic floor dysfunction.  It is now aware without provocation, she at times does lose some urine.  We talked about the Kegel exercises.  We talked about pharmacologic's.  We will hold off on those.  And she will be referred to Dr. Lewis and his team for an evaluation including urodynamics, and evaluation for the physical therapy that they provide.  - Ambulatory referral to Urogynecology; Future    2. Major depressive disorder with single episode, in full remission (CMS/HCC)  Patient's affect is bright.  We will continue to use the paroxetine, as opposed to the Lexapro.  The new prescription was sent.  The same dose will be continued.    3. Right-sided carotid artery disease, unspecified type (CMS/HCC)  Patient with distant decreased right carotid pulse.  No bruit.  We discussed risk factor modification.  And patient is at goal with blood pressure, LDL cholesterol at 70, and normal and healthy lifestyle.  We discussed the value of a baby aspirin.  She does have up to 69% blockage on the right.  So she would be a candidate.  Patient does have history of peptic ulcer disease.  She is on both a PPI and an H2 blocker.  She will take it with food and see how she does.  The ulcer did not bleed.  It was an incidental finding.  - Ultrasound carotid bilateral; Future    4. Sciatica of left side  Patient with persistent left sciatic pain.  She did have xray  while in [place]. No MRI.  We will procure the results from the provider.  And she will continue  physical therapy, as she was able to started there, and not completed.  She has found it helpful.  - Ambulatory referral to Physical Therapy; Future    5. Encounter for screening mammogram for malignant neoplasm of breast  Breast exam.  - BI SCREENING MAMMOGRAM BILATERAL(TOMOSYNTHESIS); Future    6. Menopause  Patient due for bone density.  Patient will be sure to take vitamin D 2000 international units daily.  And we will give her a calcium counter, which is a sheet that has calcium rich foods and what is serving is.  The recommendation is 1200 mg daily.  - DEXA BONE DENSITY; Future    7. Decreased pulse    - Ultrasound carotid bilateral; Future    8. Essential hypertension  Slight blood pressure control.  Patient does not require the amlodipine.    9. Hyperlipidemia, unspecified hyperlipidemia type  LDL at goal at 70.    I spent 45 minutes on this date of service performing the following activities: obtaining history, performing examination, entering orders, documenting, preparing for visit, obtaining / reviewing records and providing counseling and education.    We will procure the records from the provider in [place], including copy of the labs, imaging.      '  Sierra Montanez,   5/17/2022

## 2022-05-17 NOTE — TELEPHONE ENCOUNTER
"KJ, lets have a conversation about the \"diabetes\" diagnosis being in patient's chart.  She now looking at together.  And it seems to be embedded in the system or I cannot get it out. Lets figure this out together. Thanks   "

## 2022-05-27 ENCOUNTER — TRANSCRIBE ORDERS (OUTPATIENT)
Dept: RADIOLOGY | Facility: CLINIC | Age: 72
End: 2022-05-27

## 2022-05-27 ENCOUNTER — HOSPITAL ENCOUNTER (OUTPATIENT)
Dept: RADIOLOGY | Facility: CLINIC | Age: 72
Discharge: HOME | End: 2022-05-27
Attending: INTERNAL MEDICINE
Payer: COMMERCIAL

## 2022-05-27 DIAGNOSIS — Z78.0 ASYMPTOMATIC MENOPAUSAL STATE: Primary | ICD-10-CM

## 2022-05-27 DIAGNOSIS — Z12.31 ENCOUNTER FOR SCREENING MAMMOGRAM FOR MALIGNANT NEOPLASM OF BREAST: ICD-10-CM

## 2022-05-27 PROCEDURE — 77080 DXA BONE DENSITY AXIAL: CPT

## 2022-05-27 PROCEDURE — 77063 BREAST TOMOSYNTHESIS BI: CPT

## 2022-05-31 ENCOUNTER — TELEPHONE (OUTPATIENT)
Dept: INTERNAL MEDICINE | Facility: CLINIC | Age: 72
End: 2022-05-31
Payer: COMMERCIAL

## 2022-06-01 ENCOUNTER — TELEPHONE (OUTPATIENT)
Dept: INTERNAL MEDICINE | Facility: CLINIC | Age: 72
End: 2022-06-01
Payer: COMMERCIAL

## 2022-06-01 NOTE — TELEPHONE ENCOUNTER
Left message for patient to call the office. Her insurance does not require a Pre-Cert for MRI Lumbar Spine.    Case# [id number]

## 2022-06-03 ENCOUNTER — TRANSCRIBE ORDERS (OUTPATIENT)
Dept: SCHEDULING | Age: 72
End: 2022-06-03

## 2022-06-20 ENCOUNTER — HOSPITAL ENCOUNTER (OUTPATIENT)
Dept: CARDIOLOGY | Facility: CLINIC | Age: 72
Discharge: HOME | End: 2022-06-20
Attending: INTERNAL MEDICINE
Payer: COMMERCIAL

## 2022-06-20 DIAGNOSIS — I77.9 RIGHT-SIDED CAROTID ARTERY DISEASE, UNSPECIFIED TYPE (CMS/HCC): ICD-10-CM

## 2022-06-20 DIAGNOSIS — R09.89 DECREASED PULSE: ICD-10-CM

## 2022-06-20 LAB
LEFT CCA DIST DIAS: 19.72 CM/S
LEFT CCA DIST SYS: 60.72 CM/S
LEFT CCA MID DIAS: 13.6 CM/S
LEFT CCA MID SYS: 61.6 CM/S
LEFT CCA PROX DIAS: 28.13 CM/S
LEFT CCA PROX SYS: 80.88 CM/S
LEFT ICA DIST DIAS: 23.37 CM/S
LEFT ICA DIST SYS: 60.35 CM/S
LEFT ICA MID DIAS: 33.6 CM/S
LEFT ICA MID SYS: 76.5 CM/S
LEFT ICA PROX DIAS: 17.51 CM/S
LEFT ICA PROX SYS: 60.21 CM/S
LEFT ICA/CCA SYS: 1.26
LT ECA PROX EDV: 14.66 CM/S
LT ECA PROX PSV: 60.92 CM/S
LT VERTEBRAL PROX EDV: 8.25 CM/S
LT VERTEBRAL PROX PSV: 33.87 CM/S
RIGHT CCA DIST DIAS: 22.59 CM/S
RIGHT CCA DIST SYS: 68.83 CM/S
RIGHT CCA MID DIAS: 14.74 CM/S
RIGHT CCA MID SYS: 62.72 CM/S
RIGHT CCA PROX DIAS: 13.87 CM/S
RIGHT CCA PROX SYS: 63.59 CM/S
RIGHT ECA SYS: 73.01 CM/S
RIGHT ICA DIST DIAS: 19.08 CM/S
RIGHT ICA DIST SYS: 57.11 CM/S
RIGHT ICA MID DIAS: 19.72 CM/S
RIGHT ICA MID SYS: 67.7 CM/S
RIGHT ICA PROX DIAS: 20.44 CM/S
RIGHT ICA PROX SYS: 91.89 CM/S
RIGHT ICA/CCA SYS: 1.34
RIGHT VERTEBRAL SYS: 45.72 CM/S
RT ECA PROC EDV: 18.86 CM/S
RT VERTEBRAL PROX EDV: 12.75 CM/S

## 2022-06-20 PROCEDURE — 93880 EXTRACRANIAL BILAT STUDY: CPT

## 2022-06-21 ENCOUNTER — HOSPITAL ENCOUNTER (OUTPATIENT)
Dept: RADIOLOGY | Facility: CLINIC | Age: 72
Discharge: HOME | End: 2022-06-21
Attending: NURSE PRACTITIONER
Payer: COMMERCIAL

## 2022-06-21 DIAGNOSIS — M54.16 LUMBAR RADICULOPATHY: ICD-10-CM

## 2022-06-29 ENCOUNTER — TELEPHONE (OUTPATIENT)
Dept: INTERNAL MEDICINE | Facility: CLINIC | Age: 72
End: 2022-06-29

## 2022-06-29 NOTE — TELEPHONE ENCOUNTER
Spoke to pt and advised. Pt spoke with dr Montanez and pt is due for PT on July 11 at [facility] [place]. Gave pt info for  office

## 2022-06-29 NOTE — TELEPHONE ENCOUNTER
Call patient and let her know that I reviewed her MRI.  Is she seeing a specialist for the sciatica?  She does not have any severe stenosis, but she does have numerous spots with disc bulging.  If she does not have a specialist yet, I would recommend follow-up with a physical medicine and rehab specialist such as Dr. Jeovany Anglin or Dr. Merino with [facility]

## 2022-07-11 ENCOUNTER — HOSPITAL ENCOUNTER (OUTPATIENT)
Dept: PHYSICAL THERAPY | Facility: CLINIC | Age: 72
Setting detail: THERAPIES SERIES
Discharge: HOME | End: 2022-07-11
Attending: INTERNAL MEDICINE
Payer: COMMERCIAL

## 2022-07-11 DIAGNOSIS — M54.32 SCIATICA OF LEFT SIDE: Primary | ICD-10-CM

## 2022-07-11 PROCEDURE — 97162 PT EVAL MOD COMPLEX 30 MIN: CPT | Mod: GP

## 2022-07-11 PROCEDURE — 97530 THERAPEUTIC ACTIVITIES: CPT | Mod: GP

## 2022-07-11 NOTE — OP PT TREATMENT LOG
DOS    IE 7/11/2022   30 Day    60 Day    Precautions    Treatment G/Y/N Current Session Time   Modalities  CPT 23151 Total Time for Session Not performed   MHP    CP    TENS/E-STIM CPT 83293    Manual   CPT 43129 Total Time for Session Not performed   STM/MFR/TPR    Instrument Assisted STM     Mobilizations    ROM/Flexibility    Myofascial Decompression    Therapeutic Exercise  CPT 65769 Total Time for Session Not performed    Sets Reps Load Comment    STRETCH        Prone press up        Cobra press                LTR        Open books                        Treadmill         Elliptical          STRENGTH        hooklying TAC        bridges        Supine hip adduct        Supine hip abduct        clamshells        STS        Weighted STS                                Neuromuscular Re-Education  CPT 45990 Total Time for Session Not performed    Sets Reps Load Comment                                             Gait  CPT 57022 Total Time for Session Not performed           Therapeutic Activity   CPT 16036 Total Time for Session 8-22 Minutes   Pt education    yes Patient educated on examination findings, impairments identified, dx, prognosis and PT POC moving forward. Pt verbally reports understanding.     Group   CPT 21105 Total Time for Session Not performed       Abbreviation Key      OTB Orange Theraband   PTB Pink Theraband   GTB Green Theraband   BTB Blue Theraband   HT Head turns   HN Head nods   ST Semi tandem   EO Eyes open   EC Eyes closed   MET Muscle Energy Technique

## 2022-07-11 NOTE — PROGRESS NOTES
Referring Provider: By co-signing this Plan of Care (POC) either electronically or physically you agree to the following:    I have reviewed the the Plan of Care established by the therapist within this document and certify that the services are skilled and medically necessary. I have reviewed the plan and recommend that these services continue to meet the goals stated in this document.       EXTERNAL PROVIDER FAXING BACK:    PHYSICIAN SIGNATURE: __________________________________     DATE: ___________________  TIME: _____________    IMPORTANT:  If returning this Plan of Care by fax, please fax back ONLY the signature page.   _________________________________________________________________________      [facility] Fax: [phone]        PT EVALUATION FOR OUTPATIENT THERAPY    Patient: Marquita Mueller  MRN: [id number]  : 1950 71 y.o.   Referring Physician: Sierra Montanez DO  Date of Visit: 2022      Certification Dates:  22 through 10/09/22         Recommended Frequency & Duration:  2 times/week for up to 6 weeks     Diagnosis:   1. Sciatica of left side        Chief Complaints:   Chief Complaint   Patient presents with   • Dec ROM   • Dec Strength   • Pain   • Decreased Trunk Control       Precautions: no known precautions/restrictions, cardiac    Past Medical History:   Past Medical History:   Diagnosis Date   • Basal cell carcinoma    • Depression    • Diverticulitis of colon    • Endometrioma    • Hypertension    • Lipid disorder    • Osteoarthritis    • Thyroid nodule        Past Surgical History:   Past Surgical History:   Procedure Laterality Date   • DILATION AND CURETTAGE OF UTERUS     • HYSTERECTOMY     • LAPAROTOMY OOPHERECTOMY     • WRIST SURGERY Right          LEARNING ASSESSMENT    Assessment completed:  Yes    Learner name:  Marquita    Learner: Patient    Learning Barriers:  Learning barriers: No Barriers    Preferred Language: English      "Needed: No    Education Provided:   Method: Discussion  Readiness: acceptance and eager  Response: Demonstrated understanding and Needs reinforcement      CO-LEARNER ASSESSMENT:    Completed: No            OBJECTIVE MEASUREMENTS/DATA:    Time In Session:  Start Time: 1502  Stop Time: 1555  Time Calculation (min): 53 min   Assessment and Plan - 07/11/22 1507        Assessment    Plan of Care reviewed and patient/family in agreement Yes     System Pathology/Pathophysiology Noted musculoskeletal;neuromuscular     Functional Limitations in Following Categories (PT Eval) self-care;home management;community/leisure     Rehab Potential/Prognosis good, to achieve stated therapy goals     Problem List decreased endurance;decreased flexibility;decreased ROM;impaired motor control;impaired postural control;decreased strength;impaired sensation;pain     Clinical Assessment Pt is 70 y/o female presenting with lumbar pain/dysfunction resulting in LLE radicular pain. Pt reports that radicular pain is located in L foot and exacerbated with lumbar flexion. At this time, pt presents in an extension preference. PT observes BLE (+) slump test, decreased forward lumbar flexion, and LLE<RLE for active strength. HARJEET is recoreded as 13/50. Pt denies recent falls to the ground with the exception of a skiing incident where she tried to  another person who had recently fallen. Pt is an active individual who reports that with LLE radicular pain, \"I am afraid to make it worse, it limits me.\" Pt will benefit from skilled OP PT services in order to address limitations, work towards rehab and patient goals, and return to PLOF and chosen ADLs.     Planned Services CPT 67557 Gait training;CPT 56274 Manual therapy;CPT 04951 Neuromuscular Reeducation;CPT 71560 Therapeutic activities;CPT 33141 Therapeutic exercises;CPT 09153 Therapeutic Massage;CPT 16917 Electrical stimulation UNATTENDED;CPT 68578 Hot/Cold Packs therapy                General " "Information - 07/11/22 1507        Session Details    Document Type initial evaluation     Mode of Treatment physical therapy     Patient/Family/Caregiver Comments/Observations Pt reports to session today without AD. Pt denies pain, however reports that she feels, \"Neurological burning,\" in L foot.        General Information    Referring Physician Sierra Montanez MD     History of present illness/functional impairment Pt is 70 y/o female presenting with lumbar dysfunction resulting in LLE pain. Pt reports that pain began about 12/2021, \"I thought that I had sciatica, so I went to my PCP to get checked out.\" She states at that time, \"We were not alarmed.\" Pt reports while in [place] during the winter, 4/2022 pain began to flare up again, \"It just seemed to be there more frequently.\" Pt reports that saw OP PT x4 visits in [place], HEP given to PT; extension based program. When arrived back in [place], MRI was performed and multi levels are revealed to have herniated discs/stenosis. Pt reports that pain is persisted and continues to increase with activity. Pt reports that at current time, sitting for an extended period of time is uncomfortable, \"Walking and standing is more comfortable.\" Please assess flex vs ext bias.  However with further questioning, appears that pt may also have pain with extended standing.     Existing Precautions/Restrictions no known precautions/restrictions;cardiac                Pain/Vitals - 07/11/22 1507        Pain Assessment    Currently in pain Yes     Preferred Pain Scale word (verbal rating pain scale)     Pain Side/Orientation left     Pain: Body location Foot     Word Pain Rating: Rest 4 - moderate pain        Pain Intervention    Intervention  TE/manual     Post Intervention Comments see assessment                Falls - 07/11/22 1507        Initial Falls Assessment    One or more falls in the last year Yes     How many times 1   pt reports falling earlier this year while skiing. \"I went " "down, but I got up and kept going for another 3-4 days.\" no hitting head, LOC, or reuqired medical intervention.    Was the patient injured in any fall No                PT - 07/11/22 1507        Physical Therapy    Physical Therapy Specialty Spine PT        PT Plan    Frequency of treatment 2 times/week     PT Duration 6 weeks     PT Cert From 07/11/22     PT Cert To 10/09/22     Date PT POC was sent to provider 07/11/22     Signed PT Plan of Care received?  No                   Living Environment    Living Environment - 07/11/22 1507        Living Environment    People in Home spouse     Living Arrangements house     Living Environment Comment full flight of stairs to the basement               PLOF:    Prior Level of Function - 07/11/22 1507        OTHER    Previous level of function independent for upright mobility and ADLs.               Sensory Tests    Sensory Testing - 07/11/22 1507        Sensory Assessment (Somatosensory)    Sensory Assessment (Somatosensory) sensation intact   pt reports radicular pain described as hot/burning pain    Sensory Subjective Reports burning               ROM    Range of Motion - 07/11/22 1500        Lumbar AROM    Lumbar Flexion 85   p!    Lumbar Extension 25        Additional ROM Measurements    Additional ROM  pt reports increased pain with sustained/repeated lumbar flexion     Additional ROM  pt reports decreased pain/centralized with repeated/sustained lumbar extension     Additional ROM  BLE slump test (+)               MMT    Manual Muscle Tests - 07/11/22 1507        RIGHT: Lower Extremity Manual Muscle Test Assessment    Hip Flexion gross movement (4/5) good   p!    Hip Internal Rotation gross movement (4/5) good     Hip External Rotation gross movement (4/5) good     Knee Flexion strength (4+/5) good plus     Knee Extension strength (4+/5) good plus     Ankle Dorsiflexion gross movement (4+/5) good plus     Ankle Plantarflexion gross movement (4+/5) good plus        " "LEFT: Lower Extremity Manual Muscle Test Assessment    Hip Flexion gross movement (4-/5) good minus     Hip Internal Rotation gross movement (4-/5) good minus     Hip External Rotation gross movement (4-/5) good minus     Knee Flexion strength (4+/5) good plus     Knee Extension strength (4/5) good     Ankle Dorsiflexion gross movement (4/5) good     Ankle Plantarflexion gross movement (4/5) good               Palpation    Palpation - 07/11/22 1507        Palpation    LE Palpation  PT palpates significant tension in R lumbar paraspinals, without pain. Pt reports TTP L lumbar paraspinals with less observed tension via palpation. R greater trochanter is TTP.               Outcome Measures        Goals     •  Mutually agreed upon pain goal       Mutually agreed upon pain goal: using exercise as a tool to decrease pain          •  patient stated goals (pt-stated)       \"I want the pain in my foot to go away. I feel like I cannot be as active as I want to be with this pain.\"          •  PT Lumbar Goals       Short Term Goals Time Frame Result Comment/Progress   Pt will increase core, LE and Hip MMT >/= ½ grade 4 weeks      Pt will increase lumbar ROM >/= 10 degrees into flexion, extension, lateral flexion and rotation 4 weeks      Pt will demonstrate improvements in Oswestry >/=  12% functional improvement  4 weeks   7/11 - 13/50   Improve PSFS score >/= 20%  4 weeks   Ability to garden (bent forward position) x60 min - 4/10  Sitting at sewing table x2 hours - 4/10  Cooking a meal x60 min - 5/10    7/11 - 13/30   Pt will be independent with HEP to increase carryover at home 4 weeks      Pt will be independent with postural correction management for pain management 4 weeks         Long Term Goals Time Frame Result Comment/Progress   Pt will increase core, LE and Hip MMT >/= full grade 12 weeks       Pt will increase lumbar ROM >/= 10 degrees into flexion, extension, lateral flexion and rotation from progress note 12 weeks "      Pt will demonstrate improvements in Oswestry >/=  12% functional improvement from progress note 12 weeks      Improve PSFS score >/= 20% from progress note 12 weeks                          TREATMENT PLAN:    DOS    IE 7/11/2022   30 Day    60 Day    Precautions    Treatment G/Y/N Current Session Time   Modalities  CPT 95223 Total Time for Session Not performed   MHP    CP    TENS/E-STIM CPT 78168    Manual   CPT 50476 Total Time for Session Not performed   STM/MFR/TPR    Instrument Assisted STM     Mobilizations    ROM/Flexibility    Myofascial Decompression    Therapeutic Exercise  CPT 52892 Total Time for Session Not performed    Sets Reps Load Comment    STRETCH        Prone press up        Cobra press                LTR        Open books                        Treadmill         Elliptical          STRENGTH        hooklying TAC        bridges        Supine hip adduct        Supine hip abduct        clamshells        STS        Weighted STS                                Neuromuscular Re-Education  CPT 75432 Total Time for Session Not performed    Sets Reps Load Comment                                             Gait  CPT 38313 Total Time for Session Not performed           Therapeutic Activity   CPT 71392 Total Time for Session 8-22 Minutes   Pt education    yes Patient educated on examination findings, impairments identified, dx, prognosis and PT POC moving forward. Pt verbally reports understanding.     Group   CPT 78167 Total Time for Session Not performed       Abbreviation Key      OTB Orange Theraband   PTB Pink Theraband   GTB Green Theraband   BTB Blue Theraband   HT Head turns   HN Head nods   ST Semi tandem   EO Eyes open   EC Eyes closed   MET Muscle Energy Technique                 ASSESSMENT:    This 71 y.o. year old female presents to PT with above stated diagnosis. Physical Therapy evaluation reveals decreased endurance, decreased flexibility, decreased ROM, impaired motor control, impaired  postural control, decreased strength, impaired sensation, pain resulting in self-care, home management, community/leisure limitations. Frorest Mueller will benefit from skilled PT services to address limitation, work towards rehab and patient goals and maximize PLOF of chosen ADLs.     Planned Services: The patient's treatment will include CPT 25264 Gait training, CPT 76080 Manual therapy, CPT 64281 Neuromuscular Reeducation, CPT 29141 Therapeutic activities, CPT 99538 Therapeutic exercises, CPT 47214 Therapeutic Massage, CPT 01041 Electrical stimulation UNATTENDED, CPT 44404 Hot/Cold Packs therapy, .

## 2022-07-13 ENCOUNTER — HOSPITAL ENCOUNTER (OUTPATIENT)
Dept: RADIOLOGY | Facility: CLINIC | Age: 72
Discharge: HOME | End: 2022-07-13
Attending: INTERNAL MEDICINE
Payer: COMMERCIAL

## 2022-07-13 DIAGNOSIS — Z78.0 MENOPAUSE: ICD-10-CM

## 2022-07-14 ENCOUNTER — DOCUMENTATION (OUTPATIENT)
Dept: INTERNAL MEDICINE | Facility: CLINIC | Age: 72
End: 2022-07-14
Payer: COMMERCIAL

## 2022-07-14 NOTE — PROGRESS NOTES
Lacey Greer MA has completed a chart review for Marquita Mueller and have determined that the care gap has been satisfied.    Care Gap Source: [employer]    Care Gap(s) Identified: Annual Wellness Visit    Chart Review Completed: Yes    ROSELINE sched 09/22

## 2022-07-18 ENCOUNTER — HOSPITAL ENCOUNTER (OUTPATIENT)
Dept: PHYSICAL THERAPY | Facility: CLINIC | Age: 72
Setting detail: THERAPIES SERIES
Discharge: HOME | End: 2022-07-18
Attending: INTERNAL MEDICINE
Payer: COMMERCIAL

## 2022-07-18 DIAGNOSIS — M54.32 SCIATICA OF LEFT SIDE: Primary | ICD-10-CM

## 2022-07-18 PROCEDURE — 97110 THERAPEUTIC EXERCISES: CPT | Mod: GP,CQ

## 2022-07-18 NOTE — OP PT TREATMENT LOG
"DOS     IE 7/11/2022   30 Day     60 Day     Precautions     Treatment G/Y/N Current Session Time   Modalities  CPT 79554 Total Time for Session Not performed   MHP     CP     TENS/E-STIM CPT 02419     Manual   CPT 25975 Total Time for Session Not performed   STM/MFR/TPR     Instrument Assisted STM      Mobilizations     ROM/Flexibility     Myofascial Decompression     Therapeutic Exercise  CPT 64513 Total Time for Session 53-67 minutes    Sets Reps Load Comment    STRETCH             Prone press up  Y  2  10       Cobra press  Y  1  10  5-10\"                   LTR  Y  1  10  5\"H     Open books  Y  3  20\"                                   Treadmill   NV           Elliptical    Y    [id number]    see note- pt wearing flip flops today's session   STRENGTH             hooklying TAC  Y  2  10  5\"H     bridges  Y  2  10  3\"H     Supine hip adduct  Y  2  10  5\"H  yellow ball   Supine hip abduct  Y  2  10  5\"H  GTB   clamshells  Y  2  10  5\"H  PTB   STS  Y  2  10       Weighted STS                                                       Neuromuscular Re-Education  CPT 74665 Total Time for Session Not performed    Sets Reps Load Comment                                                                           Gait  CPT 11489 Total Time for Session Not performed               Therapeutic Activity   CPT 90786 Total Time for Session Not performed   Pt education      Patient educated on examination findings, impairments identified, dx, prognosis and PT POC moving forward. Pt verbally reports understanding.      Group   CPT 71462 Total Time for Session Not performed         Abbreviation Key       OTB Orange Theraband   PTB Pink Theraband   GTB Green Theraband   BTB Blue Theraband   HT Head turns   HN Head nods   ST Semi tandem   EO Eyes open   EC Eyes closed   MET Muscle Energy Technique           "

## 2022-07-19 NOTE — PROGRESS NOTES
"PT DAILY NOTE FOR OUTPATIENT THERAPY    Patient: Marquita Mueller MRN: [id number]  : 1950 71 y.o.  Referring Physician: Sierra Montanez DO  Date of Visit: 2022    Certification Dates: 22 through 10/09/22    Diagnosis:   1. Sciatica of left side        Chief Complaints:       Precautions:   Existing Precautions/Restrictions: no known precautions/restrictions, cardiac     TODAY'S VISIT    Time In Session:  Start Time: 1705  Stop Time: 1800  Time Calculation (min): 55 min   History/Vitals/Pain/Encounter Info - 22 1715        Injury History/Precautions/Daily Required Info    Document Type daily treatment     Primary Therapist Anderson Kearney DPT     Referring Physician Sierra Montanez MD     Existing Precautions/Restrictions no known precautions/restrictions;cardiac     History of present illness/functional impairment Pt is 70 y/o female presenting with lumbar dysfunction resulting in LLE pain. Pt reports that pain began about 2021, \"I thought that I had sciatica, so I went to my PCP to get checked out.\" She states at that time, \"We were not alarmed.\" Pt reports while in [place] during the winter, 2022 pain began to flare up again, \"It just seemed to be there more frequently.\" Pt reports that saw OP PT x4 visits in [place], HEP given to PT; extension based program. When arrived back in [place], MRI was performed and multi levels are revealed to have herniated discs/stenosis. Pt reports that pain is persisted and continues to increase with activity. Pt reports that at current time, sitting for an extended period of time is uncomfortable, \"Walking and standing is more comfortable.\" Please assess flex vs ext bias.  However with further questioning, appears that pt may also have pain with extended standing.     Patient/Family/Caregiver Comments/Observations Pt reports trying to walk which she feels has been helping her. Pain 3/10 to her hip at arrival. Pt also brought in a copy of her exercises " "given to her when in [place]- see exercises scanned in chart.     Patient reported fall since last visit No        Pain Assessment    Currently in pain Yes     Preferred Pain Scale number (Numeric Rating Pain Scale)     Pain Side/Orientation left     Pain: Body location Hip     Pain Rating (0-10): Pre Activity 3        Pain Intervention    Intervention  Aden, manual PRN     Post Intervention Comments see assessment                Daily Treatment Assessment and Plan - 07/18/22 1715        Daily Treatment Assessment and Plan    Progress toward goals Progressing     Daily Outcome Summary Pt only completed 3 minutes on eliptical due to wearing flip flops at arrival- discussed sneakers/closed shoe NV for TM/ eliptical w/u. Cues for TA with completing mat and standing Aden and discussed importance of TA with activity at home to encourage neutral spine and decrease LBP. Positive fatigue noted to her hip post clamshell. Tightness with open book stretch. No other c/o offered post session.     Plan and Recommendations Cont per PT- consider L/B stab with tband. Consider manual to piriformis if PT agrees. Issue updated HEP as able.                     OBJECTIVE DATA TAKEN TODAY:    None taken    Today's Treatment:    DOS     IE 7/11/2022   30 Day     60 Day     Precautions     Treatment G/Y/N Current Session Time   Modalities  CPT 89026 Total Time for Session Not performed   MHP     CP     TENS/E-STIM CPT 50132     Manual   CPT 41024 Total Time for Session Not performed   STM/MFR/TPR     Instrument Assisted STM      Mobilizations     ROM/Flexibility     Myofascial Decompression     Therapeutic Exercise  CPT 99837 Total Time for Session 53-67 minutes    Sets Reps Load Comment    STRETCH             Prone press up  Y  2  10       Cobra press  Y  1  10  5-10\"                   LTR  Y  1  10  5\"H     Open books  Y  3  20\"                                   Treadmill   NV           Elliptical    Y    [id number]    see note- pt wearing " "flip flops today's session   STRENGTH             hooklying TAC  Y  2  10  5\"H     bridges  Y  2  10  3\"H     Supine hip adduct  Y  2  10  5\"H  yellow ball   Supine hip abduct  Y  2  10  5\"H  GTB   clamshells  Y  2  10  5\"H  PTB   STS  Y  2  10       Weighted STS                                                       Neuromuscular Re-Education  CPT 37129 Total Time for Session Not performed    Sets Reps Load Comment                                                                           Gait  CPT 35044 Total Time for Session Not performed               Therapeutic Activity   CPT 83712 Total Time for Session Not performed   Pt education      Patient educated on examination findings, impairments identified, dx, prognosis and PT POC moving forward. Pt verbally reports understanding.      Group   CPT 46725 Total Time for Session Not performed         Abbreviation Key       OTB Orange Theraband   PTB Pink Theraband   GTB Green Theraband   BTB Blue Theraband   HT Head turns   HN Head nods   ST Semi tandem   EO Eyes open   EC Eyes closed   MET Muscle Energy Technique                                "

## 2022-07-20 ENCOUNTER — HOSPITAL ENCOUNTER (OUTPATIENT)
Dept: PHYSICAL THERAPY | Facility: CLINIC | Age: 72
Setting detail: THERAPIES SERIES
Discharge: HOME | End: 2022-07-20
Attending: INTERNAL MEDICINE
Payer: COMMERCIAL

## 2022-07-20 DIAGNOSIS — M54.32 SCIATICA OF LEFT SIDE: Primary | ICD-10-CM

## 2022-07-20 PROCEDURE — 97110 THERAPEUTIC EXERCISES: CPT | Mod: GP | Performed by: PHYSICAL THERAPIST

## 2022-07-20 PROCEDURE — 97010 HOT OR COLD PACKS THERAPY: CPT | Mod: GP | Performed by: PHYSICAL THERAPIST

## 2022-07-20 NOTE — PROGRESS NOTES
"PT DAILY NOTE FOR OUTPATIENT THERAPY    Patient: Marquita Mueller MRN: [id number]  : 1950 71 y.o.  Referring Physician: Sierra Montanez DO  Date of Visit: 2022    Certification Dates: 22 through 10/09/22    Diagnosis:   1. Sciatica of left side        Chief Complaints:       Precautions:   Existing Precautions/Restrictions: no known precautions/restrictions, cardiac     TODAY'S VISIT    Time In Session:  Start Time: 1100  Stop Time: 1155  Time Calculation (min): 55 min   History/Vitals/Pain/Encounter Info - 22 1105        Injury History/Precautions/Daily Required Info    Document Type daily treatment     Primary Therapist Anderson Kearney DPT     Referring Physician Sierra Montanez MD     Existing Precautions/Restrictions no known precautions/restrictions;cardiac     History of present illness/functional impairment Pt is 72 y/o female presenting with lumbar dysfunction resulting in LLE pain. Pt reports that pain began about 2021, \"I thought that I had sciatica, so I went to my PCP to get checked out.\" She states at that time, \"We were not alarmed.\" Pt reports while in [place] during the winter, 2022 pain began to flare up again, \"It just seemed to be there more frequently.\" Pt reports that saw OP PT x4 visits in [place], HEP given to PT; extension based program. When arrived back in [place], MRI was performed and multi levels are revealed to have herniated discs/stenosis. Pt reports that pain is persisted and continues to increase with activity. Pt reports that at current time, sitting for an extended period of time is uncomfortable, \"Walking and standing is more comfortable.\" Please assess flex vs ext bias.  However with further questioning, appears that pt may also have pain with extended standing.     Patient/Family/Caregiver Comments/Observations Overall 3/10 pain in the left buttock     Patient reported fall since last visit No        Pain Assessment    Currently in pain Yes     " "Preferred Pain Scale number (Numeric Rating Pain Scale)     Pain Side/Orientation left     Pain: Body location Back     Pain Rating (0-10): Pre Activity 3     Pain Rating (0-10): Post Activity 0        Pain Intervention    Intervention  improved     Post Intervention Comments monitored                Daily Treatment Assessment and Plan - 07/20/22 1105        Daily Treatment Assessment and Plan    Progress toward goals Progressing     Daily Outcome Summary Pt responds well to extension program. Educated on proper technique and frequency. She was given updated HEP on physitrack. Avoid flexion exercises. Neutral spine + extension for TE and stretching. Pt is going out of town next week but has lumbar extension to complete at home.     Plan and Recommendations Extension program. stretching posterior chain, core strengthening                     OBJECTIVE DATA TAKEN TODAY:    None taken    Today's Treatment:    DOS     IE 7/11/2022   30 Day     60 Day     Precautions     Treatment G/Y/N Current Session Time   Modalities  CPT 59039 Total Time for Session Not performed   MHP     CP     TENS/E-STIM CPT 86234     Manual   CPT 17900 Total Time for Session Not performed   STM/MFR/TPR     Instrument Assisted STM      Mobilizations     ROM/Flexibility     Myofascial Decompression     Therapeutic Exercise  CPT 34906 Total Time for Session 53-67 minutes    Sets Reps Load Comment    STRETCH             Prone press up  Y  2  10       Cobra press  Y  1  10  5-10\"                   LTR    1  10  5\"H     Open books    3  20\"        Prone  y  1  10        Prone on elbows  y    3 min       Prone press up y 2 10     Standing lumbar extension y 2 10  At the wall   Piriformis stretch y 3 30 sec  Supine figure 4   Hamstring stretch y 3 30 sec  With strap supine   Calf stretch y 3 30 sc  On wedge standing           Treadmill   Yes    7 min    speed 1.7    Elliptical   N         STRENGTH             hooklying TAC   2  10  5\"H     bridges    2 " " 10  3\"H     Supine hip adduct    2  10  5\"H  yellow ball   Supine hip abduct    2  10  5\"H  GTB   clamshells    2  10  5\"H  PTB   STS    2  10       Weighted STS                                                       Neuromuscular Re-Education  CPT 14827 Total Time for Session Not performed    Sets Reps Load Comment                                                                           Gait  CPT 24207 Total Time for Session Not performed               Therapeutic Activity   CPT 81494 Total Time for Session Not performed   Pt education      Patient educated on examination findings, impairments identified, dx, prognosis and PT POC moving forward. Pt verbally reports understanding.      Group   CPT 52324 Total Time for Session Not performed         Abbreviation Key       OTB Orange Theraband   PTB Pink Theraband   GTB Green Theraband   BTB Blue Theraband   HT Head turns   HN Head nods   ST Semi tandem   EO Eyes open   EC Eyes closed   MET Muscle Energy Technique                                "

## 2022-07-20 NOTE — OP PT TREATMENT LOG
"DOS     IE 7/11/2022   30 Day     60 Day     Precautions     Treatment G/Y/N Current Session Time   Modalities  CPT 80109 Total Time for Session Not performed   MHP     CP     TENS/E-STIM CPT 84140     Manual   CPT 43705 Total Time for Session Not performed   STM/MFR/TPR     Instrument Assisted STM      Mobilizations     ROM/Flexibility     Myofascial Decompression     Therapeutic Exercise  CPT 49745 Total Time for Session 53-67 minutes    Sets Reps Load Comment    STRETCH             Prone press up  Y  2  10       Cobra press  Y  1  10  5-10\"                   LTR    1  10  5\"H     Open books    3  20\"        Prone  y  Group  1  10        Prone on elbows  y Group    3 min       Prone press up y  Group 2 10     Standing lumbar extension y  Group 2 10  At the wall   Piriformis stretch y Group 3 30 sec  Supine figure 4   Hamstring stretch y Group 3 30 sec  With strap supine   Calf stretch y Group 3 30 sc  On wedge standing           Treadmill   Yes    7 min    speed 1.7    Elliptical   N         STRENGTH             hooklying TAC   2  10  5\"H     bridges    2  10  3\"H     Supine hip adduct    2  10  5\"H  yellow ball   Supine hip abduct    2  10  5\"H  GTB   clamshells    2  10  5\"H  PTB   STS    2  10       Weighted STS                                                       Neuromuscular Re-Education  CPT 70550 Total Time for Session Not performed    Sets Reps Load Comment                                                                           Gait  CPT 25240 Total Time for Session Not performed               Therapeutic Activity   CPT 40126 Total Time for Session Not performed   Pt education      Patient educated on examination findings, impairments identified, dx, prognosis and PT POC moving forward. Pt verbally reports understanding.      Group   CPT 27773 Total Time for Session Not performed         Abbreviation Key       OTB Orange Theraband   PTB Pink Theraband   GTB Green Theraband   BTB Blue Theraband   HT " Head turns   HN Head nods   ST Semi tandem   EO Eyes open   EC Eyes closed   MET Muscle Energy Technique

## 2022-07-20 NOTE — ADDENDUM NOTE
Encounter addended by: Katiana Duffy, PT on: 7/20/2022 3:49 PM   Actions taken: Flowsheet accepted, Charge Capture section accepted, Clinical Note Signed

## 2022-08-02 ENCOUNTER — HOSPITAL ENCOUNTER (OUTPATIENT)
Dept: PHYSICAL THERAPY | Facility: CLINIC | Age: 72
Setting detail: THERAPIES SERIES
Discharge: HOME | End: 2022-08-02
Attending: INTERNAL MEDICINE
Payer: COMMERCIAL

## 2022-08-02 DIAGNOSIS — M54.32 SCIATICA OF LEFT SIDE: Primary | ICD-10-CM

## 2022-08-02 PROCEDURE — 97110 THERAPEUTIC EXERCISES: CPT | Mod: GP

## 2022-08-02 NOTE — PROGRESS NOTES
"PT DAILY NOTE FOR OUTPATIENT THERAPY    Patient: Marquita Mueller MRN: [id number]  : 1950 71 y.o.  Referring Physician: Sierra Montanez DO  Date of Visit: 2022    Certification Dates: 22 through 10/09/22    Diagnosis:   1. Sciatica of left side        Chief Complaints:  Lumbar Pain    Precautions:   Existing Precautions/Restrictions: no known precautions/restrictions, cardiac     TODAY'S VISIT    Time In Session:  Start Time: 1500  Stop Time: 1555  Time Calculation (min): 55 min   History/Vitals/Pain/Encounter Info - 22 1516        Injury History/Precautions/Daily Required Info    Document Type daily treatment     Primary Therapist Anderson Kearney, PT     Chief Complaint/Reason for Visit  Lumbar Pain     Referring Physician Sierra Montanez MD     Existing Precautions/Restrictions no known precautions/restrictions;cardiac     History of present illness/functional impairment Pt is 72 y/o female presenting with lumbar dysfunction resulting in LLE pain. Pt reports that pain began about 2021, \"I thought that I had sciatica, so I went to my PCP to get checked out.\" She states at that time, \"We were not alarmed.\" Pt reports while in [place] during the winter, 2022 pain began to flare up again, \"It just seemed to be there more frequently.\" Pt reports that saw OP PT x4 visits in [place], HEP given to PT; extension based program. When arrived back in [place], MRI was performed and multi levels are revealed to have herniated discs/stenosis. Pt reports that pain is persisted and continues to increase with activity. Pt reports that at current time, sitting for an extended period of time is uncomfortable, \"Walking and standing is more comfortable.\" Please assess flex vs ext bias.  However with further questioning, appears that pt may also have pain with extended standing.     Patient/Family/Caregiver Comments/Observations Pt reports to session today with 3/10 pain in L glute, denies recent falls to " "the ground.     Patient reported fall since last visit No        Pain Assessment    Currently in pain Yes     Preferred Pain Scale number (Numeric Rating Pain Scale)     Pain Side/Orientation left     Pain: Body location Buttock     Pain Rating (0-10): Pre Activity 3        Pain Intervention    Intervention  TE/manual     Post Intervention Comments see assessment                Daily Treatment Assessment and Plan - 08/02/22 1516        Daily Treatment Assessment and Plan    Progress toward goals Progressing     Daily Outcome Summary Pt is able to tolerate the entire session today. Pt begins on treadmill for an active warm up, followed by stretching program and core stabilization TE. Physioball bridge exercises performed today without increasing lumbar pain. Pt reports an increase in fatigue, denies overall change in pain. Pt would continue to benefit from skilled OP PT intervention in order to increase functional mobility and return to prior lifestyle.     Plan and Recommendations please be cautious of extension program as pt demonstrates an INCREASE in radicular symptoms with standing lumbar extension today                     OBJECTIVE DATA TAKEN TODAY:    None taken    Today's Treatment:    DOS     IE 7/11/2022   30 Day     60 Day     Precautions     Treatment G/Y/N Current Session Time   Modalities  CPT 85107 Total Time for Session Not performed   MHP     CP     TENS/E-STIM CPT 14230     Manual   CPT 35701 Total Time for Session Not performed   STM/MFR/TPR     Instrument Assisted STM      Mobilizations     ROM/Flexibility     Myofascial Decompression     Therapeutic Exercise  CPT 41796 Total Time for Session 53-67 minutes    Sets Reps Load Comment    STRETCH             Prone press up  Y  2  10       Cobra press  Y  1  10  5-10\"                   LTR  yes  1  10  5\"H     Open books  yes  3  20\"                                 Standing lumbar extension Please HOLD; increases radicular symptoms  2 10  At the wall " "  Piriformis stretch y  3 30 sec  Supine figure 4   Hamstring stretch y  3 30 sec  With strap supine   Calf stretch y  3 30 sc  On wedge standing           Treadmill   Yes    7 min    speed 1.7    Elliptical   N         STRENGTH             hooklying TAC   2  10  5\"H     bridges    2  10  3\"H     Supine hip adduct    2  10  5\"H  yellow ball   Supine hip abduct    2  10  5\"H  GTB   clamshells  yes  2  10  5\"H  GTB   STS    2  10       Weighted STS yes 3 10 10# KB             PB bridges yes 3 10  G PB   PB hamstring curls Yes 3 5  G PB                                                     Neuromuscular Re-Education  CPT 06771 Total Time for Session Not performed    Sets Reps Load Comment                                                                           Gait  CPT 84912 Total Time for Session Not performed               Therapeutic Activity   CPT 08252 Total Time for Session Not performed   Pt education      Patient educated on examination findings, impairments identified, dx, prognosis and PT POC moving forward. Pt verbally reports understanding.      Group   CPT 42172 Total Time for Session Not performed         Abbreviation Key       OTB Orange Theraband   PTB Pink Theraband   GTB Green Theraband   BTB Blue Theraband   HT Head turns   HN Head nods   ST Semi tandem   EO Eyes open   EC Eyes closed   MET Muscle [employer] Technique                                "

## 2022-08-02 NOTE — OP PT TREATMENT LOG
"DOS     IE 7/11/2022   30 Day     60 Day     Precautions     Treatment G/Y/N Current Session Time   Modalities  CPT 86970 Total Time for Session Not performed   MHP     CP     TENS/E-STIM CPT 33719     Manual   CPT 11867 Total Time for Session Not performed   STM/MFR/TPR     Instrument Assisted STM      Mobilizations     ROM/Flexibility     Myofascial Decompression     Therapeutic Exercise  CPT 29729 Total Time for Session 53-67 minutes    Sets Reps Load Comment    STRETCH             Prone press up  Y  2  10       Cobra press  Y  1  10  5-10\"                   LTR  yes  1  10  5\"H     Open books  yes  3  20\"                                 Standing lumbar extension Please HOLD; increases radicular symptoms  2 10  At the wall   Piriformis stretch y  3 30 sec  Supine figure 4   Hamstring stretch y  3 30 sec  With strap supine   Calf stretch y  3 30 sc  On wedge standing           Treadmill   Yes    7 min    speed 1.7    Elliptical   N         STRENGTH             hooklying TAC   2  10  5\"H     bridges    2  10  3\"H     Supine hip adduct    2  10  5\"H  yellow ball   Supine hip abduct    2  10  5\"H  GTB   clamshells  yes  2  10  5\"H  GTB   STS    2  10       Weighted STS yes 3 10 10# KB             PB bridges yes 3 10  G PB   PB hamstring curls Yes 3 5  G PB                                                     Neuromuscular Re-Education  CPT 01379 Total Time for Session Not performed    Sets Reps Load Comment                                                                           Gait  CPT 62379 Total Time for Session Not performed               Therapeutic Activity   CPT 94215 Total Time for Session Not performed   Pt education      Patient educated on examination findings, impairments identified, dx, prognosis and PT POC moving forward. Pt verbally reports understanding.      Group   CPT 68485 Total Time for Session Not performed         Abbreviation Key       OTB Orange Theraband   PTB Pink Theraband   GTB Green " Theraband   BTB Blue Theraband   HT Head turns   HN Head nods   ST Semi tandem   EO Eyes open   EC Eyes closed   MET Muscle [employer] Technique

## 2022-08-05 ENCOUNTER — HOSPITAL ENCOUNTER (OUTPATIENT)
Dept: PHYSICAL THERAPY | Facility: CLINIC | Age: 72
Setting detail: THERAPIES SERIES
Discharge: HOME | End: 2022-08-05
Attending: INTERNAL MEDICINE
Payer: COMMERCIAL

## 2022-08-05 DIAGNOSIS — M54.32 SCIATICA OF LEFT SIDE: Primary | ICD-10-CM

## 2022-08-05 PROCEDURE — 97530 THERAPEUTIC ACTIVITIES: CPT | Mod: GP,CQ,59

## 2022-08-05 PROCEDURE — 97110 THERAPEUTIC EXERCISES: CPT | Mod: GP,CQ,59

## 2022-08-05 PROCEDURE — 97150 GROUP THERAPEUTIC PROCEDURES: CPT | Mod: GP,CQ

## 2022-08-05 NOTE — PROGRESS NOTES
"PT DAILY NOTE FOR OUTPATIENT THERAPY    Patient: Marquita Mueller MRN: [id number]  : 1950 71 y.o.  Referring Physician: Sierra Montanez DO  Date of Visit: 2022    Certification Dates: 22 through 10/09/22    Diagnosis:   1. Sciatica of left side        Chief Complaints:  Lumbar Pain    Precautions:   Existing Precautions/Restrictions: no known precautions/restrictions, cardiac     TODAY'S VISIT    Time In Session:  Start Time: 800  Stop Time: 912  Time Calculation (min): 72 min   History/Vitals/Pain/Encounter Info - 22 0756        Injury History/Precautions/Daily Required Info    Document Type daily treatment     Primary Therapist Anderson Kearney, PT     Chief Complaint/Reason for Visit  Lumbar Pain     Referring Physician Sierra Montanez MD     Existing Precautions/Restrictions no known precautions/restrictions;cardiac     History of present illness/functional impairment Pt is 72 y/o female presenting with lumbar dysfunction resulting in LLE pain. Pt reports that pain began about 2021, \"I thought that I had sciatica, so I went to my PCP to get checked out.\" She states at that time, \"We were not alarmed.\" Pt reports while in [place] during the winter, 2022 pain began to flare up again, \"It just seemed to be there more frequently.\" Pt reports that saw OP PT x4 visits in [place], HEP given to PT; extension based program. When arrived back in [place], MRI was performed and multi levels are revealed to have herniated discs/stenosis. Pt reports that pain is persisted and continues to increase with activity. Pt reports that at current time, sitting for an extended period of time is uncomfortable, \"Walking and standing is more comfortable.\" Please assess flex vs ext bias.  However with further questioning, appears that pt may also have pain with extended standing.     Patient/Family/Caregiver Comments/Observations Pt arrives to therapy this morning with reports of feeling well overall, though " "ongoing mild discomfort to mid back and L hip. Pt notes her son who is a PT instructed her to perform a few stretches targeting her L hip flexor, stating \"they made it feel really good!\". Pt notes pain at rest is mild. Pt denies falls or changes in medications recently.     Patient reported fall since last visit No        Pain Assessment    Currently in pain Yes     Preferred Pain Scale word (verbal rating pain scale)     Pain Side/Orientation left     Pain: Body location Back;Hip     Word Pain Rating: Rest 2 - mild pain        Pain Intervention    Intervention  TM w/u, [place]     Post Intervention Comments See assessment                Daily Treatment Assessment and Plan - 08/05/22 0756        Daily Treatment Assessment and Plan    Progress toward goals Progressing     Daily Outcome Summary Pt seen this morning and continued under current PT POC. Session initiated with TM w/u, with no reports of increased pain or radicular sx's. Pt then performed MAT stretching and strengthening activities, with progressions made as reflected in Rx log. Pt with good tolerance to all interventions, with appropriate challenge noted. HEP updated and education/cueing provided. Hip flexor stretch at step shown and reviewed with pt. Pt demo good verbal understanding to all education and info provided. Continue per PT POC to achieve established goals and maximize overall function.     Plan and Recommendations Continue per PT POC.                     OBJECTIVE DATA TAKEN TODAY:    None taken    Today's Treatment:    DOS     IE 7/11/2022   30 Day     60 Day     Precautions     Treatment G/Y/N Current Session Time   Modalities  CPT 97786 Total Time for Session Not performed   MHP     CP     TENS/E-STIM CPT 63913     Manual   CPT 72354 Total Time for Session Not performed   STM/MFR/TPR     Instrument Assisted STM      Mobilizations     ROM/Flexibility     Myofascial Decompression     Therapeutic Exercise  CPT 08740 Total Time for Session  30 " "minutes+group    Sets Reps Load Comment    STRETCH             Prone press up   2  10       Cobra press   1  10  5-10\"                   LTR Yes   3'     SKTC Yes/g 5ea  10''    Piriformis stretch Yes/g 3ea  30''    Calf stretch Yes/g 3  30'' @step   Hip flexor stretch Yes 3  30'' Instruction   HS stretch Yes/g 3ea  30'' Strap   Open books   3  20\"                                 Standing lumbar extension  2 10  At the wall           Treadmill  Yes       10' self-paced   Elliptical            STRENGTH             Hooklying TAC Yes  20  5\"     Supine bridges Yes  2  10   +TAC       2  10  5\"H  yellow ball       2  10  5\"H  GTB   Clamshells Yes  2  10   GTB   STS    2  10       Weighted STS  3 10 10# KB             PB bridges  3 10  G PB   PB hamstring curls  3 5  G PB                                                     Neuromuscular Re-Education  CPT 13036 Total Time for Session Not performed    Sets Reps Load Comment                                                                           Gait  CPT 49226 Total Time for Session Not performed               Therapeutic Activity   CPT 69637 Total Time for Session 8-22 minutes   Pt education HEP update/instruction; pt verbalized understanding    Patient educated on examination findings, impairments identified, dx, prognosis and PT POC moving forward. Pt verbally reports understanding.      Group   CPT 83748 Total Time for Session Not performed         Abbreviation Key       OTB Orange Theraband   PTB Pink Theraband   GTB Green Theraband   BTB Blue Theraband   HT Head turns   HN Head nods   ST Semi tandem   EO Eyes open   EC Eyes closed   MET [employer]cle [employer] Technique                                "

## 2022-08-05 NOTE — OP PT TREATMENT LOG
"DOS     IE 7/11/2022   30 Day     60 Day     Precautions     Treatment G/Y/N Current Session Time   Modalities  CPT 10052 Total Time for Session Not performed   MHP     CP     TENS/E-STIM CPT 03589     Manual   CPT 79669 Total Time for Session Not performed   STM/MFR/TPR     Instrument Assisted STM      Mobilizations     ROM/Flexibility     Myofascial Decompression     Therapeutic Exercise  CPT 07311 Total Time for Session  30 minutes+group    Sets Reps Load Comment    STRETCH             Prone press up   2  10       Cobra press   1  10  5-10\"                   LTR Yes   3'     SKTC Yes/g 5ea  10''    Piriformis stretch Yes/g 3ea  30''    Calf stretch Yes/g 3  30'' @step   Hip flexor stretch Yes 3  30'' Instruction   HS stretch Yes/g 3ea  30'' Strap   Open books   3  20\"                                 Standing lumbar extension  2 10  At the wall           Treadmill  Yes       10' self-paced   Elliptical            STRENGTH             Hooklying TAC Yes  20  5\"     Supine bridges Yes  2  10   +TAC       2  10  5\"H  yellow ball       2  10  5\"H  GTB   Clamshells Yes  2  10   GTB   STS    2  10       Weighted STS  3 10 10# KB             PB bridges  3 10  G PB   PB hamstring curls  3 5  G PB                                                     Neuromuscular Re-Education  CPT 59035 Total Time for Session Not performed    Sets Reps Load Comment                                                                           Gait  CPT 56186 Total Time for Session Not performed               Therapeutic Activity   CPT 74182 Total Time for Session 8-22 minutes   Pt education HEP update/instruction; pt verbalized understanding          Group   CPT 99370 Total Time for Session Not performed         Abbreviation Key       OTB Orange Theraband   PTB Pink Theraband   GTB Green Theraband   BTB Blue Theraband   HT Head turns   HN Head nods   ST Semi tandem   EO Eyes open   EC Eyes closed   MET Muscle [employer] Technique           "

## 2022-08-09 ENCOUNTER — HOSPITAL ENCOUNTER (OUTPATIENT)
Dept: PHYSICAL THERAPY | Facility: CLINIC | Age: 72
Setting detail: THERAPIES SERIES
Discharge: HOME | End: 2022-08-09
Attending: INTERNAL MEDICINE
Payer: COMMERCIAL

## 2022-08-09 DIAGNOSIS — M54.32 SCIATICA OF LEFT SIDE: Primary | ICD-10-CM

## 2022-08-09 PROCEDURE — 97530 THERAPEUTIC ACTIVITIES: CPT | Mod: GP

## 2022-08-09 PROCEDURE — 97110 THERAPEUTIC EXERCISES: CPT | Mod: GP

## 2022-08-09 NOTE — PROGRESS NOTES
"  PT PROGRESS NOTE FOR OUTPATIENT THERAPY    Patient: Marquita Mueller MRN: [id number]  : 1950 71 y.o.  Referring Physician: Sierra Montanez DO  Date of Visit: 2022      Certification Dates: 22 through 10/09/22    Recommended Frequency & Duration:  2 times/week for up to 6 weeks          Diagnosis:   1. Sciatica of left side        Chief Complaints:  No chief complaint on file.      Precautions:   Existing Precautions/Restrictions: no known precautions/restrictions, cardiac     TODAY'S VISIT:    Time In Session:  Start Time: 758  Stop Time: 853  Time Calculation (min): 55 min   General Information - 22 0814        Session Details    Document Type progress note     Mode of Treatment physical therapy     Patient/Family/Caregiver Comments/Observations Pt reports to session today with improved centralized symptoms compared to previous session.        General Information    Referring Physician Sierra Montanez MD     History of present illness/functional impairment Pt is 72 y/o female presenting with lumbar dysfunction resulting in LLE pain. Pt reports that pain began about 2021, \"I thought that I had sciatica, so I went to my PCP to get checked out.\" She states at that time, \"We were not alarmed.\" Pt reports while in [place] during the winter, 2022 pain began to flare up again, \"It just seemed to be there more frequently.\" Pt reports that saw OP PT x4 visits in [place], HEP given to PT; extension based program. When arrived back in [place], MRI was performed and multi levels are revealed to have herniated discs/stenosis. Pt reports that pain is persisted and continues to increase with activity. Pt reports that at current time, sitting for an extended period of time is uncomfortable, \"Walking and standing is more comfortable.\" Please assess flex vs ext bias.  However with further questioning, appears that pt may also have pain with extended standing.     Existing Precautions/Restrictions no " "known precautions/restrictions;cardiac                Daily Falls Screen - 08/09/22 0814        Daily Falls Assessment    Patient reported fall since last visit No                Pain/Vitals - 08/09/22 0814        Pain Assessment    Currently in pain Yes     Preferred Pain Scale number (Numeric Rating Pain Scale)     Pain Side/Orientation left     Pain: Body location Buttock;Hip     Pain Rating (0-10): Pre Activity 1        Pain Intervention    Intervention  TE/manual     Post Intervention Comments see assessment                PT - 08/09/22 0814        Physical Therapy    Physical Therapy [facility] PT        PT Plan    Frequency of treatment 2 times/week     PT Duration 6 weeks     PT Cert From 07/11/22     PT Cert To 10/09/22     Date PT POC was sent to provider 08/02/22     Signed PT Plan of Care received?  Yes   manually faxed               Assessment and Plan - 08/09/22 0814        Assessment    Plan of Care reviewed and patient/family in agreement Yes     System Pathology/Pathophysiology Noted musculoskeletal;neuromuscular     Functional Limitations in Following Categories (PT Eval) self-care;home management;community/leisure     Rehab Potential/Prognosis good, to achieve stated therapy goals     Problem List decreased endurance;decreased flexibility;decreased ROM;impaired motor control;impaired postural control;decreased strength;impaired sensation;pain     Clinical Assessment Progress note administered today as 30 days have occurred since IE. Pt begins on treadmill for an active warm up, followed by goals assessment. HARJEET and PSFS are significantly improved. Pt is complaint with HEP; all goals are progressing well. Afterwards, pt completes stretching and core stabilization exercises. Pt reports an overall reduction of pain at the end of the session, \"This is really working.\" Pt would continue to benefit from skilled OP PT intervention in order to increase functional mobility and return to prior " "lifestyle.     Planned Services CPT 07969 Gait training;CPT 33022 Manual therapy;CPT 37269 Neuromuscular Reeducation;CPT 51722 Therapeutic activities;CPT 59529 Therapeutic exercises;CPT 10794 Therapeutic Massage;CPT 17022 Electrical stimulation UNATTENDED;CPT 53955 Hot/Cold Packs therapy                 OBJECTIVE MEASUREMENTS/DATA:    ROM    Range of Motion - 08/09/22 0800        Lumbar AROM    Lumbar Flexion 90   \"Strain\"    Lumbar Extension 25               MMT    Manual Muscle Tests - 08/09/22 0814        RIGHT: Lower Extremity Manual Muscle Test Assessment    Hip Flexion gross movement (4/5) good   p!    Hip Internal Rotation gross movement (4+/5) good plus     Hip External Rotation gross movement (4+/5) good plus     Knee Flexion strength (4+/5) good plus     Knee Extension strength (4+/5) good plus     Ankle Dorsiflexion gross movement (4+/5) good plus     Ankle Plantarflexion gross movement (4+/5) good plus        LEFT: Lower Extremity Manual Muscle Test Assessment    Hip Flexion gross movement (4/5) good     Hip Internal Rotation gross movement (4/5) good     Hip External Rotation gross movement (4/5) good     Knee Flexion strength (4+/5) good plus     Knee Extension strength (4+/5) good plus     Ankle Dorsiflexion gross movement (4+/5) good plus     Ankle Plantarflexion gross movement (4+/5) good plus               Outcome Measures    PT Outcome Measures - 08/09/22 0814        Subjective Outcome Measures    Oswestry 7/50   IE: 13/50                ROM and MMT        Some values may be hidden. Unless noted otherwise, only the newest values recorded on each date are displayed.         PT UE ROM Measurements 7/11/22 8/9/22   No data to display.      PT LE ROM Measurements 7/11/22 8/9/22   No data to display.      PT Cervical/Lumbar/Other ROM Measurements 7/11/22 8/9/22   AROM: Lumbar Flexion 85  p! 90  \"Strain\"   AROM: Lumar Extension 25 25   Additional ROM  pt reports increased pain with sustained/repeated " "lumbar flexion    Additional ROM  pt reports decreased pain/centralized with repeated/sustained lumbar extension    Additional ROM  BLE slump test (+)       Hand ROM Measurements 7/11/22 8/9/22   No data to display.      PT UE MMT Measurements 7/11/22 8/9/22   No data to display.      PT LE MMT 7/11/22 8/9/22   Right Hip Flexion (4/5) good  p! (4/5) good  p!   Left Hip Flexion (4-/5) good minus (4/5) good   Right Hip IR (4/5) good (4+/5) good plus   Left Hip IR (4-/5) good minus (4/5) good   Right Hip ER (4/5) good (4+/5) good plus   Left Hip ER (4-/5) good minus (4/5) good   Right Knee Flexion (4+/5) good plus (4+/5) good plus   Left Knee Flexion (4+/5) good plus (4+/5) good plus   Right Knee Extension (4+/5) good plus (4+/5) good plus   Left Knee Extension (4/5) good (4+/5) good plus   Right Ankle DF (4+/5) good plus (4+/5) good plus   Left Ankle DF (4/5) good (4+/5) good plus   Right Ankle PF (4+/5) good plus (4+/5) good plus   Left Ankle PF (4/5) good (4+/5) good plus           Outcome Measures    PT OBJECTIVE Outcome Measures 8/9/22   No data to display.      PT SUBJECTIVE Outcome Measures 8/9/22   Oswestry 7/50  IE: 13/50             Today's Treatment::    Education provided:  Yes: See treatment log for details of education provided    DOS     IE 7/11/2022   30 Day 8/9/2022    60 Day     Precautions     Treatment G/Y/N Current Session Time   Modalities  CPT 27110 Total Time for Session Not performed   MHP     CP     TENS/E-STIM CPT 90188     Manual   CPT 76545 Total Time for Session Not performed   STM/MFR/TPR     Instrument Assisted STM      Mobilizations     ROM/Flexibility     Myofascial Decompression     Therapeutic Exercise  CPT 90527 Total Time for Session 38-52 Minutes     Sets Reps Load Comment    STRETCH             Prone press up   2  10       Cobra press   1  10  5-10\"                   LTR Yes   3'     SKTC Yes 5ea  10''    Piriformis stretch Yes 3ea  30''    Calf stretch Yes 3  30'' @step   Hip " "flexor stretch Yes 3  30'' Instruction   HS stretch Yes 3ea  30'' Strap   Open books   3  20\"                                 Standing lumbar extension hold 2 10  At the wall           Treadmill  Yes       10' self-paced   Elliptical            STRENGTH             Hooklying TAC   20  5\"     Supine bridges   2  10   +TAC       2  10  5\"H  yellow ball       2  10  5\"H  GTB   Clamshells Yes  2  10   GTB   STS yes  2  10       Weighted STS yes 3 10 5# KB             PB bridges  3 10  G PB   PB hamstring curls  3 5  G PB   pallof press yes 2 10  GTB                                             Neuromuscular Re-Education  CPT 42720 Total Time for Session Not performed    Sets Reps Load Comment                                                                           Gait  CPT 91946 Total Time for Session Not performed               Therapeutic Activity   CPT 27288 Total Time for Session 8-22 minutes   Progress Note  Yes    Pt education HEP update/instruction; pt verbalized understanding          Group   CPT 85349 Total Time for Session Not performed         Abbreviation Key       OTB Orange Theraband   PTB Pink Theraband   GTB Green Theraband   BTB Blue Theraband   HT Head turns   HN Head nods   ST Semi tandem   EO Eyes open   EC Eyes closed   MET Muscle Energy Technique                  Goals Addressed                    This Visit's Progress    •  Mutually agreed upon pain goal         Mutually agreed upon pain goal: using exercise as a tool to decrease pain met           •  patient stated goals (pt-stated)         \"I want the pain in my foot to go away. I feel like I cannot be as active as I want to be with this pain.\" Progressing           •  PT Lumbar Goals         Short Term Goals Time Frame Result Comment/Progress   Pt will increase core, LE and Hip MMT >/= ½ grade 4 weeks Progressing     Pt will increase lumbar ROM >/= 10 degrees into flexion, extension, lateral flexion and rotation 4 weeks Progressing     Pt will " demonstrate improvements in Oswestry >/=  12% functional improvement  4 weeks Met  7/11 - 13/50 8/9 - 7/50   Improve PSFS score >/= 20%  4 weeks Met  Ability to garden (bent forward position) x60 min - 5/10  Sitting at sewing table x2 hours - 5/10  Cooking a meal x60 min - 8/10    7/11 - 13/30 8/9 - 18/30   Pt will be independent with HEP to increase carryover at home 4 weeks Met     Pt will be independent with postural correction management for pain management 4 weeks Progressing        Long Term Goals Time Frame Result Comment/Progress   Pt will increase core, LE and Hip MMT >/= full grade 12 weeks       Pt will increase lumbar ROM >/= 10 degrees into flexion, extension, lateral flexion and rotation from progress note 12 weeks      Pt will demonstrate improvements in Oswestry >/=  12% functional improvement from progress note 12 weeks      Improve PSFS score >/= 20% from progress note 12 weeks

## 2022-08-09 NOTE — OP PT TREATMENT LOG
"DOS     IE 7/11/2022   30 Day 8/9/2022    60 Day     Precautions     Treatment G/Y/N Current Session Time   Modalities  CPT 80597 Total Time for Session Not performed   MHP     CP     TENS/E-STIM CPT 89964     Manual   CPT 04089 Total Time for Session Not performed   STM/MFR/TPR     Instrument Assisted STM      Mobilizations     ROM/Flexibility     Myofascial Decompression     Therapeutic Exercise  CPT 85491 Total Time for Session 38-52 Minutes     Sets Reps Load Comment    STRETCH             Prone press up   2  10       Cobra press   1  10  5-10\"                   LTR Yes   3'     SKTC Yes 5ea  10''    Piriformis stretch Yes 3ea  30''    Calf stretch Yes 3  30'' @step   Hip flexor stretch Yes 3  30'' Instruction   HS stretch Yes 3ea  30'' Strap   Open books   3  20\"                                 Standing lumbar extension hold 2 10  At the wall           Treadmill  Yes       10' self-paced   Elliptical            STRENGTH             Hooklying TAC   20  5\"     Supine bridges   2  10   +TAC       2  10  5\"H  yellow ball       2  10  5\"H  GTB   Clamshells Yes  2  10   GTB   STS yes  2  10       Weighted STS yes 3 10 5# KB             PB bridges  3 10  G PB   PB hamstring curls  3 5  G PB   pallof press yes 2 10  GTB                                             Neuromuscular Re-Education  CPT 82994 Total Time for Session Not performed    Sets Reps Load Comment                                                                           Gait  CPT 13426 Total Time for Session Not performed               Therapeutic Activity   CPT 61001 Total Time for Session 8-22 minutes   Progress Note  Yes    Pt education HEP update/instruction; pt verbalized understanding          Group   CPT 50140 Total Time for Session Not performed         Abbreviation Key       OTB Orange Theraband   PTB Pink Theraband   GTB Green Theraband   BTB Blue Theraband   HT Head turns   HN Head nods   ST Semi tandem   EO Eyes open   EC Eyes closed   MET " Muscle Energy Technique

## 2022-08-12 ENCOUNTER — HOSPITAL ENCOUNTER (OUTPATIENT)
Dept: PHYSICAL THERAPY | Facility: CLINIC | Age: 72
Setting detail: THERAPIES SERIES
Discharge: HOME | End: 2022-08-12
Attending: INTERNAL MEDICINE
Payer: COMMERCIAL

## 2022-08-12 DIAGNOSIS — M54.32 SCIATICA OF LEFT SIDE: Primary | ICD-10-CM

## 2022-08-12 PROCEDURE — 97110 THERAPEUTIC EXERCISES: CPT | Mod: GP,CQ

## 2022-08-12 NOTE — PROGRESS NOTES
"PT DAILY NOTE FOR OUTPATIENT THERAPY    Patient: Marquita Mueller MRN: [id number]  : 1950 71 y.o.  Referring Physician: Sierra Montanez DO  Date of Visit: 2022    Certification Dates: 22 through 10/09/22    Diagnosis:   1. Sciatica of left side        Chief Complaints:  Lumbar Pain    Precautions:   Existing Precautions/Restrictions: no known precautions/restrictions, cardiac     TODAY'S VISIT    Time In Session:  Start Time: 0800  Stop Time: 08  Time Calculation (min): 57 min   History/Vitals/Pain/Encounter Info - 22 0803        Injury History/Precautions/Daily Required Info    Document Type daily treatment     Primary Therapist Anderson Kearney, PT     Chief Complaint/Reason for Visit  Lumbar Pain     Referring Physician Sierra Montanez MD     Existing Precautions/Restrictions no known precautions/restrictions;cardiac     History of present illness/functional impairment Pt is 72 y/o female presenting with lumbar dysfunction resulting in LLE pain. Pt reports that pain began about 2021, \"I thought that I had sciatica, so I went to my PCP to get checked out.\" She states at that time, \"We were not alarmed.\" Pt reports while in [place] during the winter, 2022 pain began to flare up again, \"It just seemed to be there more frequently.\" Pt reports that saw OP PT x4 visits in [place], HEP given to PT; extension based program. When arrived back in [place], MRI was performed and multi levels are revealed to have herniated discs/stenosis. Pt reports that pain is persisted and continues to increase with activity. Pt reports that at current time, sitting for an extended period of time is uncomfortable, \"Walking and standing is more comfortable.\" Please assess flex vs ext bias.  However with further questioning, appears that pt may also have pain with extended standing.     Patient/Family/Caregiver Comments/Observations Pt arrives to therapy this morning with reports of feeling well overall. Pt " "notes \"the exercises are really helping\". notes compliance with HEP, as well as subjective feeling as though performing them makes a significant difference in LBP. 2/10 B/L LBP at rest this morning. Met with Ortho yesterday to discuss MRI results; \"he discussed injections as potential option, though we both agreed as of now PT is working\".     Patient reported fall since last visit No        Pain Assessment    Currently in pain Yes     Preferred Pain Scale number (Numeric Rating Pain Scale)     Pain Side/Orientation generalized     Pain: Body location Back     Pain Rating (0-10): Pre Activity 2        Pain Intervention    Intervention  Active w/u, [place]     Post Intervention Comments See assessment                Daily Treatment Assessment and Plan - 08/12/22 0803        Daily Treatment Assessment and Plan    Progress toward goals Progressing     Daily Outcome Summary Pt seen this morning and able to tolerate full session. Initiated with active w/u on treadmill, with education and cues to maintain upright posture during ambulation and minimze forward flexed posture; pt verbalized understanding and demo ability to self-correct. Warm up followed by MAT stretching and core stabilization activities; pt able to tolerate with appropriate challenge noted. Pt expresses comfort and good understanding of current HEP; directed to continue per current program.     Plan and Recommendations Continue per PT POC; progress strengthening as appropriate.                     OBJECTIVE DATA TAKEN TODAY:    None taken    Today's Treatment:    DOS     IE 7/11/2022   30 Day 8/9/2022    60 Day     Precautions     Treatment G/Y/N Current Session Time   Modalities  CPT 17002 Total Time for Session Not performed   MHP     CP     TENS/E-STIM CPT 50604     Manual   CPT 76940 Total Time for Session Not performed   STM/MFR/TPR     Instrument Assisted STM      Mobilizations     ROM/Flexibility     Myofascial Decompression     Therapeutic " "Exercise  CPT 21700 Total Time for Session 38-52 Minutes     Sets Reps Load Comment    STRETCH             Prone press up   2  10       Cobra press   1  10  5-10\"                   LTR Yes   3'     SKTC Yes 5ea  10''    Piriformis stretch Yes 3ea  30''    Calf stretch Yes 3  30'' @step   Hip flexor stretch Yes 3  30'' @step   HS stretch Yes 3ea  30'' Strap   Dead bug Yes 2 5ea     Open books   3  20\"                                 Standing lumbar extension hold 2 10  At the wall           Treadmill  Yes       10' self-paced   Elliptical            STRENGTH             Hooklying TAC   20  5\"     Supine bridges  2  10   +TAC      2  10  5\"H  yellow ball      2  10  5\"H  GTB   Clamshells Yes 2  10   GTB   STS  2  10       Weighted STS Yes 3 10 5# KB             PB bridges  3 10  G PB   PB hamstring curls  3 5  G PB   Pallof press Yes 2 10  GTB                                             Neuromuscular Re-Education  CPT 86593 Total Time for Session Not performed    Sets Reps Load Comment                                                                           Gait  CPT 86740 Total Time for Session Not performed               Therapeutic Activity   CPT 43866 Total Time for Session Not performed       Pt education           Group   CPT 82898 Total Time for Session Not performed         Abbreviation Key       OTB Orange Theraband   PTB Pink Theraband   GTB Green Theraband   BTB Blue Theraband   HT Head turns   HN Head nods   ST Semi tandem   EO Eyes open   EC Eyes closed   MET Muscle Energy Technique                                  "

## 2022-08-12 NOTE — OP PT TREATMENT LOG
"DOS     IE 7/11/2022   30 Day 8/9/2022    60 Day     Precautions     Treatment G/Y/N Current Session Time   Modalities  CPT 48109 Total Time for Session Not performed   MHP     CP     TENS/E-STIM CPT 02609     Manual   CPT 06847 Total Time for Session Not performed   STM/MFR/TPR     Instrument Assisted STM      Mobilizations     ROM/Flexibility     Myofascial Decompression     Therapeutic Exercise  CPT 70525 Total Time for Session 53-67 Minutes     Sets Reps Load Comment    STRETCH             Prone press up   2  10       Cobra press   1  10  5-10\"                   LTR Yes   3'     SKTC Yes 5ea  10''    Piriformis stretch Yes 3ea  30''    Calf stretch Yes 3  30'' @step   Hip flexor stretch Yes 3  30'' @step   HS stretch Yes 3ea  30'' Strap   Dead bug Yes 2 5ea     Open books   3  20\"                                 Standing lumbar extension hold 2 10  At the wall           Treadmill  Yes       10' self-paced   Elliptical            STRENGTH             Hooklying TAC   20  5\"     Supine bridges  2  10   +TAC      2  10  5\"H  yellow ball      2  10  5\"H  GTB   Clamshells Yes 2  10   GTB   STS  2  10       Weighted STS Yes 3 10 5# KB             PB bridges  3 10  G PB   PB hamstring curls  3 5  G PB   Pallof press Yes 2 10  GTB                                             Neuromuscular Re-Education  CPT 20268 Total Time for Session Not performed    Sets Reps Load Comment                                                                           Gait  CPT 25944 Total Time for Session Not performed               Therapeutic Activity   CPT 47070 Total Time for Session Not performed       Pt education           Group   CPT 00252 Total Time for Session Not performed         Abbreviation Key       OTB Orange Theraband   PTB Pink Theraband   GTB Green Theraband   BTB Blue Theraband   HT Head turns   HN Head nods   ST Semi tandem   EO Eyes open   EC Eyes closed   MET Muscle Energy Technique             "

## 2022-08-16 ENCOUNTER — HOSPITAL ENCOUNTER (OUTPATIENT)
Dept: PHYSICAL THERAPY | Facility: CLINIC | Age: 72
Setting detail: THERAPIES SERIES
Discharge: HOME | End: 2022-08-16
Attending: INTERNAL MEDICINE
Payer: COMMERCIAL

## 2022-08-16 DIAGNOSIS — M54.32 SCIATICA OF LEFT SIDE: Primary | ICD-10-CM

## 2022-08-16 PROCEDURE — 97110 THERAPEUTIC EXERCISES: CPT | Mod: GP,CQ

## 2022-08-16 NOTE — PROGRESS NOTES
"PT DAILY NOTE FOR OUTPATIENT THERAPY    Patient: Marquita Mueller MRN: [id number]  : 1950 71 y.o.  Referring Physician: Sierra Montanez DO  Date of Visit: 2022    Certification Dates: 22 through 10/09/22    Diagnosis:   1. Sciatica of left side        Chief Complaints:  Lumbar Pain    Precautions:   Existing Precautions/Restrictions: no known precautions/restrictions, cardiac     TODAY'S VISIT    Time In Session:  Start Time: 08  Stop Time: 900  Time Calculation (min): 60 min   History/Vitals/Pain/Encounter Info - 22 0809        Injury History/Precautions/Daily Required Info    Document Type daily treatment     Primary Therapist Anderson Kearney, PT     Chief Complaint/Reason for Visit  Lumbar Pain     Referring Physician Sierra Montanez MD     Existing Precautions/Restrictions no known precautions/restrictions;cardiac     History of present illness/functional impairment Pt is 70 y/o female presenting with lumbar dysfunction resulting in LLE pain. Pt reports that pain began about 2021, \"I thought that I had sciatica, so I went to my PCP to get checked out.\" She states at that time, \"We were not alarmed.\" Pt reports while in [place] during the winter, 2022 pain began to flare up again, \"It just seemed to be there more frequently.\" Pt reports that saw OP PT x4 visits in [place], HEP given to PT; extension based program. When arrived back in [place], MRI was performed and multi levels are revealed to have herniated discs/stenosis. Pt reports that pain is persisted and continues to increase with activity. Pt reports that at current time, sitting for an extended period of time is uncomfortable, \"Walking and standing is more comfortable.\" Please assess flex vs ext bias.  However with further questioning, appears that pt may also have pain with extended standing.     Patient/Family/Caregiver Comments/Observations Pt reports soreness 2/10 at arrival- been feeling much better.     Patient " "reported fall since last visit No        Pain Assessment    Currently in pain Yes     Preferred Pain Scale number (Numeric Rating Pain Scale)     Pain Side/Orientation generalized     Pain: Body location Back     Pain Rating (0-10): Pre Activity 2        Pain Intervention    Intervention  Aden     Post Intervention Comments see assessment                Daily Treatment Assessment and Plan - 08/16/22 0824        Daily Treatment Assessment and Plan    Progress toward goals Progressing     Daily Outcome Summary Pt with active w/u TM at arrival with continued cues for TA/ upright posture and heel toe gait pattern with ambulation. Cues for shoulder depression with L/B stab and kettlebell squats completed. Fatigued post clamshell L hip. No other c/o offered post session.     Plan and Recommendations Cont per PT- consider quadriped firehydrants for glut strength, consider modified side plank on knees.                     OBJECTIVE DATA TAKEN TODAY:    None taken    Today's Treatment:    DOS     IE 7/11/2022   30 Day 8/9/2022    60 Day     Precautions     Treatment G/Y/N Current Session Time   Modalities  CPT 84482 Total Time for Session Not performed   MHP     CP     TENS/E-STIM CPT 39668     Manual   CPT 54581 Total Time for Session Not performed   STM/MFR/TPR     Instrument Assisted STM      Mobilizations     ROM/Flexibility     Myofascial Decompression     Therapeutic Exercise  CPT 11021 Total Time for Session 53-67 Minutes      Sets Reps Load Comment    STRETCH             Prone press up    2  10       Cobra press    1  10  5-10\"                   LTR Yes     3'     SKTC Yes 3ea   30''     Piriformis stretch Yes 3ea   30''     Calf stretch Yes 3   30'' @slantboard   Hip flexor stretch Yes 3   30'' @step   HS stretch Yes 3ea   30'' Strap   Dead bug Yes 2 5ea       Open books    3  20\"                                                 Standing lumbar extension hold 2 10   At the wall                 Treadmill  Yes  2.0-2.5 " "mph     10' self-paced   Elliptical               STRENGTH             Hooklying TAC     20  5\"     Supine bridges   2  10   +TAC       2  10  5\"H  yellow ball       2  10  5\"H  GTB   Clamshells Yes 2  10  5\"H  GTB   Quadriped firehydrants        STS   2  10       Weighted STS Yes 3 10 5# KB      side stepping with tband  Yes           PB bridges  Yes 3 10   G PB   PB hamstring curls  Yes 3 5   G PB   Pallof press Yes 2 10   GTB                                             Neuromuscular Re-Education  CPT 06557 Total Time for Session Not performed     Sets Reps Load Comment                                                                           Gait  CPT 43771 Total Time for Session Not performed               Therapeutic Activity   CPT 11122 Total Time for Session Not performed         Pt education              Group   CPT 02464 Total Time for Session Not performed         Abbreviation Key       OTB Orange Theraband   PTB Pink Theraband   GTB Green Theraband   BTB Blue Theraband   HT Head turns   HN Head nods   ST Semi tandem   EO Eyes open   EC Eyes closed   MET Muscle Energy Technique                                   "

## 2022-08-16 NOTE — OP PT TREATMENT LOG
"DOS     IE 7/11/2022   30 Day 8/9/2022    60 Day     Precautions     Treatment G/Y/N Current Session Time   Modalities  CPT 09541 Total Time for Session Not performed   MHP     CP     TENS/E-STIM CPT 04491     Manual   CPT 00694 Total Time for Session Not performed   STM/MFR/TPR     Instrument Assisted STM      Mobilizations     ROM/Flexibility     Myofascial Decompression     Therapeutic Exercise  CPT 16664 Total Time for Session 53-67 Minutes      Sets Reps Load Comment    STRETCH             Prone press up    2  10       Cobra press    1  10  5-10\"                   LTR Yes     3'     SKTC Yes 3ea   30''     Piriformis stretch Yes 3ea   30''     Calf stretch Yes 3   30'' @slantboard   Hip flexor stretch Yes 3   30'' @step   HS stretch Yes 3ea   30'' Strap   Dead bug Yes 2 5ea       Open books    3  20\"                                                 Standing lumbar extension hold 2 10   At the wall                 Treadmill  Yes  2.0-2.5 mph     10' self-paced   Elliptical               STRENGTH             Hooklying TAC     20  5\"     Supine bridges   2  10   +TAC       2  10  5\"H  yellow ball       2  10  5\"H  GTB   Clamshells Yes 2  10  5\"H  GTB   Quadriped firehydrants        STS   2  10       Weighted STS Yes 3 10 5# KB      side stepping with tband  Yes           PB bridges  Yes 3 10   G PB   PB hamstring curls  Yes 3 5   G PB   Pallof press Yes 2 10   GTB                                             Neuromuscular Re-Education  CPT 40748 Total Time for Session Not performed     Sets Reps Load Comment                                                                           Gait  CPT 83220 Total Time for Session Not performed               Therapeutic Activity   CPT 65744 Total Time for Session Not performed         Pt education              Group   CPT 71658 Total Time for Session Not performed         Abbreviation Key       OTB Orange Theraband   PTB Pink Theraband   GTB Green Theraband   BTB Blue Theraband "   HT Head turns   HN Head nods   ST Semi tandem   EO Eyes open   EC Eyes closed   MET Muscle Energy Technique

## 2022-08-24 ENCOUNTER — HOSPITAL ENCOUNTER (OUTPATIENT)
Dept: PHYSICAL THERAPY | Facility: CLINIC | Age: 72
Setting detail: THERAPIES SERIES
Discharge: HOME | End: 2022-08-24
Attending: INTERNAL MEDICINE
Payer: COMMERCIAL

## 2022-08-24 DIAGNOSIS — M54.32 SCIATICA OF LEFT SIDE: Primary | ICD-10-CM

## 2022-08-24 PROCEDURE — 97530 THERAPEUTIC ACTIVITIES: CPT | Mod: GP,59

## 2022-08-24 PROCEDURE — 97150 GROUP THERAPEUTIC PROCEDURES: CPT | Mod: GP

## 2022-08-24 NOTE — PROGRESS NOTES
"  PT DISCHARGE NOTE FOR OUTPATIENT THERAPY    Patient: Marquita Mueller MRN: [id number]  : 1950 72 y.o.  Referring Physician: Sierra Montanez DO  Date of Visit: 2022      Certification Dates: 22 through 10/09/22    Chief Complaints:  No chief complaint on file.      Precautions:  Existing Precautions/Restrictions: no known precautions/restrictions, cardiac     TODAY'S VISIT:    Time In Session:  Start Time: 802  Stop Time: 842  Time Calculation (min): 40 min   General Information - 22 0810        Session Details    Document Type discharge evaluation     Mode of Treatment physical therapy     Patient/Family/Caregiver Comments/Observations Pt denies pain today, requests discharge today.        General Information    Referring Physician Sierra Montanez MD     History of present illness/functional impairment Pt is 70 y/o female presenting with lumbar dysfunction resulting in LLE pain. Pt reports that pain began about 2021, \"I thought that I had sciatica, so I went to my PCP to get checked out.\" She states at that time, \"We were not alarmed.\" Pt reports while in [place] during the winter, 2022 pain began to flare up again, \"It just seemed to be there more frequently.\" Pt reports that saw OP PT x4 visits in [place], HEP given to PT; extension based program. When arrived back in [place], MRI was performed and multi levels are revealed to have herniated discs/stenosis. Pt reports that pain is persisted and continues to increase with activity. Pt reports that at current time, sitting for an extended period of time is uncomfortable, \"Walking and standing is more comfortable.\" Please assess flex vs ext bias.  However with further questioning, appears that pt may also have pain with extended standing.     Existing Precautions/Restrictions no known precautions/restrictions;cardiac                Daily Falls Screen - 22 0810        Daily Falls Assessment    Patient reported fall since last " "visit No                Pain/Vitals - 08/24/22 0810        Pain Assessment    Currently in pain No/Denies        Pain Intervention    Intervention  none     Post Intervention Comments none                PT - 08/24/22 0810        Physical Therapy    Physical Therapy [facility] PT        PT Plan    Frequency of treatment 2 times/week     PT Duration 6 weeks     PT Cert From 07/11/22     PT Cert To 10/09/22     Date PT POC was sent to provider 08/02/22     Signed PT Plan of Care received?  Yes   manually faxed               Assessment and Plan - 08/24/22 0810        Assessment    Plan of Care reviewed and patient/family in agreement Yes     Clinical Assessment Pt reports to session today requesting discharge today, \"I think that I need to do this on my own. I have the confidence now.\" Pt performs an active warm up, followed by goals assessment. Goals are met with the exception of HARJEET, remains unchanged. Finalized HEP is created, reviewed with pt, questions are asked and addressed. Pt reports verbally that HEP can be completed independently at home. Pt is appropriate for discharge at this time and agrees with plan for d/c.                 OBJECTIVE MEASUREMENTS/DATA:    Outcome Measures    PT Outcome Measures - 08/24/22 0810        Subjective Outcome Measures    Oswestry 7/50   unchanged                ROM and MMT        Some values may be hidden. Unless noted otherwise, only the newest values recorded on each date are displayed.         PT UE ROM Measurements 7/11/22 8/9/22   No data to display.      PT LE ROM Measurements 7/11/22 8/9/22   No data to display.      PT Cervical/Lumbar/Other ROM Measurements 7/11/22 8/9/22   AROM: Lumbar Flexion 85  p! 90  \"Strain\"   AROM: Lumar Extension 25 25   Additional ROM  pt reports increased pain with sustained/repeated lumbar flexion    Additional ROM  pt reports decreased pain/centralized with repeated/sustained lumbar extension    Additional ROM  BLE slump test (+)     " "  Hand ROM Measurements 7/11/22 8/9/22   No data to display.      PT UE MMT Measurements 7/11/22 8/9/22   No data to display.      PT LE MMT 7/11/22 8/9/22   Right Hip Flexion (4/5) good  p! (4/5) good  p!   Left Hip Flexion (4-/5) good minus (4/5) good   Right Hip IR (4/5) good (4+/5) good plus   Left Hip IR (4-/5) good minus (4/5) good   Right Hip ER (4/5) good (4+/5) good plus   Left Hip ER (4-/5) good minus (4/5) good   Right Knee Flexion (4+/5) good plus (4+/5) good plus   Left Knee Flexion (4+/5) good plus (4+/5) good plus   Right Knee Extension (4+/5) good plus (4+/5) good plus   Left Knee Extension (4/5) good (4+/5) good plus   Right Ankle DF (4+/5) good plus (4+/5) good plus   Left Ankle DF (4/5) good (4+/5) good plus   Right Ankle PF (4+/5) good plus (4+/5) good plus   Left Ankle PF (4/5) good (4+/5) good plus           Outcome Measures    PT OBJECTIVE Outcome Measures 8/9/22 8/24/22   No data to display.      PT SUBJECTIVE Outcome Measures 8/9/22 8/24/22   Oswestry 7/50  IE: 13/50 7/50  unchanged             Today's Treatment:    Education provided:  Yes: See treatment log for details of education provided    DOS     IE 7/11/2022   30 Day 8/9/2022    Discharge 8/23/2022    Precautions     Treatment G/Y/N Current Session Time   Modalities  CPT 10571 Total Time for Session Not performed   MHP     CP     TENS/E-STIM CPT 62381     Manual   CPT 55033 Total Time for Session Not performed   STM/MFR/TPR     Instrument Assisted STM      Mobilizations     ROM/Flexibility     Myofascial Decompression     Therapeutic Exercise  CPT 83440 Total Time for Session Group     Sets Reps Load Comment    STRETCH             Prone press up    2  10       Cobra press    1  10  5-10\"                   LTR      3'     SKTC  3ea   30''     Piriformis stretch  3ea   30''     Calf stretch  3   30'' @slantboard   Hip flexor stretch  3   30'' @step   HS stretch  3ea   30'' Strap   Dead bug  2 5ea       Open books   3  20\"               " "                                  Standing lumbar extension  2 10   At the wall                 Treadmill  Yes/G  2.0-2.5 mph     10' self-paced   Elliptical               STRENGTH             Hooklying TAC     20  5\"     Supine bridges   2  10   +TAC       2  10  5\"H  yellow ball       2  10  5\"H  GTB   Clamshells  2  10  5\"H  GTB   Quadriped firehydrants        STS  2  10       Weighted STS  3 10 5# KB      side stepping with tband            PB bridges  3 10   G PB   PB hamstring curls  3 5   G PB   Pallof press  2 10   GTB                                           Neuromuscular Re-Education  CPT 07423 Total Time for Session Not performed     Sets Reps Load Comment                                                                           Gait  CPT 04418 Total Time for Session Not performed               Therapeutic Activity   CPT 72396 Total Time for Session 23-37 Minutes     Progress Note Finalized HEP is created, reviewed with pt, questions are asked and addressed. Pt reports verbally that HEP can be completed independently at home.      Pt education              Group   CPT 21041 Total Time for Session Not performed         Abbreviation Key       OTB Orange Theraband   PTB Pink Theraband   GTB Green Theraband   BTB Blue Theraband   HT Head turns   HN Head nods   ST Semi tandem   EO Eyes open   EC Eyes closed   MET Muscle Energy Technique                   Goals Addressed                    This Visit's Progress    •  COMPLETED: Mutually agreed upon pain goal         Mutually agreed upon pain goal: using exercise as a tool to decrease pain met           •  COMPLETED: patient stated goals (pt-stated)         \"I want the pain in my foot to go away. I feel like I cannot be as active as I want to be with this pain.\" Met            •  PT Lumbar Goals         Short Term Goals Time Frame Result Comment/Progress   Pt will increase core, LE and Hip MMT >/= ½ grade 4 weeks Progressing     Pt will increase lumbar ROM >/= " 10 degrees into flexion, extension, lateral flexion and rotation 4 weeks Progressing     Pt will demonstrate improvements in Oswestry >/=  12% functional improvement  4 weeks Met  7/11 - 13/50 8/9 - 7/50   Improve PSFS score >/= 20%  4 weeks Met  Ability to garden (bent forward position) x60 min - 5/10  Sitting at sewing table x2 hours - 5/10  Cooking a meal x60 min - 8/10    7/11 - 13/30 8/9 - 18/30   Pt will be independent with HEP to increase carryover at home 4 weeks Met     Pt will be independent with postural correction management for pain management 4 weeks Progressing        Long Term Goals Time Frame Result Comment/Progress   Pt will increase core, LE and Hip MMT >/= full grade 12 weeks Met      Pt will increase lumbar ROM >/= 10 degrees into flexion, extension, lateral flexion and rotation from progress note 12 weeks Met     Pt will demonstrate improvements in Oswestry >/=  12% functional improvement from progress note 12 weeks Not met  7/11 - 13/50 8/9 - 7/50 8/23 - 7/50   Improve PSFS score >/= 20% from progress note 12 weeks Met  Ability to garden (bent forward position) x60 min - 8/10  Sitting at sewing table x2 hours - 10/10  Cooking a meal x60 min - 10/10    7/11 - 13/30 8/9 - 18/30 8/23 - 28/30                   Discharge information for [facility]:

## 2022-08-24 NOTE — OP PT TREATMENT LOG
"DOS     IE 7/11/2022   30 Day 8/9/2022    Discharge 8/23/2022    Precautions     Treatment G/Y/N Current Session Time   Modalities  CPT 82487 Total Time for Session Not performed   MHP     CP     TENS/E-STIM CPT 96418     Manual   CPT 81377 Total Time for Session Not performed   STM/MFR/TPR     Instrument Assisted STM      Mobilizations     ROM/Flexibility     Myofascial Decompression     Therapeutic Exercise  CPT 91029 Total Time for Session Group     Sets Reps Load Comment    STRETCH             Prone press up    2  10       Cobra press    1  10  5-10\"                   LTR      3'     SKTC  3ea   30''     Piriformis stretch  3ea   30''     Calf stretch  3   30'' @slantboard   Hip flexor stretch  3   30'' @step   HS stretch  3ea   30'' Strap   Dead bug  2 5ea       Open books   3  20\"                                                 Standing lumbar extension  2 10   At the wall                 Treadmill  Yes  2.0-2.5 mph     10' self-paced   Elliptical               STRENGTH             Hooklying TAC     20  5\"     Supine bridges   2  10   +TAC       2  10  5\"H  yellow ball       2  10  5\"H  GTB   Clamshells  2  10  5\"H  GTB   Quadriped firehydrants        STS  2  10       Weighted STS  3 10 5# KB      side stepping with tband            PB bridges  3 10   G PB   PB hamstring curls  3 5   G PB   Pallof press  2 10   GTB                                           Neuromuscular Re-Education  CPT 44988 Total Time for Session Not performed     Sets Reps Load Comment                                                                           Gait  CPT 63084 Total Time for Session Not performed               Therapeutic Activity   CPT 68214 Total Time for Session 23-37 Minutes     Progress Note Finalized HEP is created, reviewed with pt, questions are asked and addressed. Pt reports verbally that HEP can be completed independently at home.      Pt education              Group   CPT 82951 Total Time for Session Not performed "         Abbreviation Key       OTB Orange Theraband   PTB Pink Theraband   GTB Green Theraband   BTB Blue Theraband   HT Head turns   HN Head nods   ST Semi tandem   EO Eyes open   EC Eyes closed   MET Muscle Energy Technique

## 2022-08-25 NOTE — ADDENDUM NOTE
Encounter addended by: Anderson Kearney, PT on: 8/25/2022 1:22 PM   Actions taken: Clinical Note Signed

## 2022-09-23 RX ORDER — EZETIMIBE 10 MG/1
10 TABLET ORAL
Qty: 90 TABLET | Refills: 3 | Status: SHIPPED | OUTPATIENT
Start: 2022-09-23 | End: 2023-07-14

## 2022-10-20 RX ORDER — HYDROCHLOROTHIAZIDE 25 MG/1
TABLET ORAL
Qty: 90 TABLET | Refills: 3 | Status: SHIPPED | OUTPATIENT
Start: 2022-10-20 | End: 2023-07-14 | Stop reason: SDUPTHER

## 2022-10-20 RX ORDER — RAMIPRIL 10 MG/1
CAPSULE ORAL
Qty: 180 CAPSULE | Refills: 3 | Status: SHIPPED | OUTPATIENT
Start: 2022-10-20 | End: 2023-07-14 | Stop reason: SDUPTHER

## 2022-11-03 RX ORDER — PANTOPRAZOLE SODIUM 40 MG/1
40 TABLET, DELAYED RELEASE ORAL DAILY
Qty: 90 TABLET | Refills: 1 | Status: SHIPPED | OUTPATIENT
Start: 2022-11-03 | End: 2023-07-14 | Stop reason: SDUPTHER

## 2022-11-03 NOTE — TELEPHONE ENCOUNTER
Medicine Refill Request    Last Office: 5/17/2022   Last Consult Visit: Visit date not found  Last Telemedicine Visit: 4/20/2020 Sierra Montanez, DO    Next Appointment: 5/22/2023      Current Outpatient Medications:     aspirin 81 mg chewable tablet, Take 1 tablet (81 mg total) by mouth daily., Disp: 90 tablet, Rfl: 1    calcium carbonate/vitamin D3 (CALCIUM 500 WITH D ORAL), , Disp: , Rfl:     celecoxib 100 mg capsule, TAKE 1 CAPSULE TWICE A DAY, Disp: 180 capsule, Rfl: 3    ezetimibe (ZETIA) 10 mg tablet, Take 1 tablet (10 mg total) by mouth once daily., Disp: 90 tablet, Rfl: 3    famotidine 40 mg tablet, TAKE 1 TABLET NIGHTLY, Disp: 90 tablet, Rfl: 3    fluticasone propionate (FLONASE) 50 mcg/actuation nasal spray, , Disp: , Rfl:     hydrochlorothiazide (HYDRODIURIL) 25 mg tablet, TAKE 1 TABLET DAILY, Disp: 90 tablet, Rfl: 3    hydrocortisone (ANUSOL-HC) 25 mg suppository, Insert 1 suppository (25 mg total) into the rectum 2 (two) times a day as needed for hemorrhoids., Disp: 20 suppository, Rfl: 0    metoprolol succinate XL (TOPROL-XL) 100 mg 24 hr tablet, Take 1 tablet (100 mg total) by mouth once daily., Disp: 90 tablet, Rfl: 3    pantoprazole (PROTONIX) 40 mg EC tablet, Take 1 tablet (40 mg total) by mouth daily., Disp: 90 tablet, Rfl: 1    PARoxetine CR (PAXIL CR) 12.5 mg 24 hr tablet, Take 1 tablet (12.5 mg total) by mouth every morning., Disp: 90 tablet, Rfl: 1    ramipriL (ALTACE) 10 mg capsule, TAKE 2 CAPSULES ONCE DAILY, Disp: 180 capsule, Rfl: 3    rosuvastatin (CRESTOR) 40 mg tablet, Take 1 tablet (40 mg total) by mouth once daily., Disp: 90 tablet, Rfl: 3      BP Readings from Last 3 Encounters:   05/17/22 128/78   12/07/21 (!) 160/90   08/10/21 132/82       Recent Lab results:  Lab Results   Component Value Date    CHOL 172 09/29/2020   ,   Lab Results   Component Value Date    HDL 57 09/29/2020   ,   Lab Results   Component Value Date    LDLCALC 85 09/29/2020   ,   Lab Results    Component Value Date    TRIG 148 09/29/2020        Lab Results   Component Value Date    GLUCOSE 98 05/06/2021   ,   Lab Results   Component Value Date    HGBA1C 6.1 (H) 09/29/2020         Lab Results   Component Value Date    CREATININE 1.08 (H) 05/06/2021       Lab Results   Component Value Date    TSH 0.76 09/29/2020           RX Refill

## 2023-01-16 RX ORDER — FAMOTIDINE 40 MG/1
TABLET, FILM COATED ORAL
Qty: 90 TABLET | Refills: 3 | Status: SHIPPED | OUTPATIENT
Start: 2023-01-16 | End: 2023-07-14 | Stop reason: SDUPTHER

## 2023-01-25 ASSESSMENT — KERATOMETRY
OD_K2POWER_DIOPTERS: 44.50
OS_AXISANGLE2_DEGREES: 165
OD_K1POWER_DIOPTERS: 43.00
OD_AXISANGLE2_DEGREES: 172
OD_AXISANGLE_DEGREES: 82
OS_AXISANGLE_DEGREES: 75
OS_K2POWER_DIOPTERS: 44.50
OS_K1POWER_DIOPTERS: 42.75

## 2023-01-26 ENCOUNTER — COMPREHENSIVE EXAM (OUTPATIENT)
Dept: URBAN - METROPOLITAN AREA CLINIC 47 | Facility: CLINIC | Age: 73
End: 2023-01-26

## 2023-01-26 DIAGNOSIS — H25.813: ICD-10-CM

## 2023-01-26 DIAGNOSIS — H01.111: ICD-10-CM

## 2023-01-26 DIAGNOSIS — H52.7: ICD-10-CM

## 2023-01-26 DIAGNOSIS — H01.114: ICD-10-CM

## 2023-01-26 PROCEDURE — 92014 COMPRE OPH EXAM EST PT 1/>: CPT

## 2023-01-26 PROCEDURE — 92015 DETERMINE REFRACTIVE STATE: CPT

## 2023-01-26 ASSESSMENT — VISUAL ACUITY
OS_SC: J3
OU_CC: J1
OU_CC: 20/25
OD_SC: 20/30
OD_CC: 20/40
OS_CC: J2
OD_CC: J1
OS_SC: 20/40-2
OU_SC: 20/30
OS_CC: 20/40
OD_SC: J6
OU_SC: J2

## 2023-01-26 ASSESSMENT — TONOMETRY
OD_IOP_MMHG: 16
OS_IOP_MMHG: 16

## 2023-02-27 ENCOUNTER — DOCUMENTATION (OUTPATIENT)
Dept: INTERNAL MEDICINE | Facility: CLINIC | Age: 73
End: 2023-02-27
Payer: COMMERCIAL

## 2023-02-27 NOTE — PROGRESS NOTES
Lacey Greer MA has completed a chart review for Marquita GILBERT Ami and have determined that the care gap has been satisfied.    Care Gap Source: [employer]    Care Gap(s) Identified: Annual Wellness Visit    Chart Review Completed: Yes  Pt. Maw sched 06/2023    No outreach will be made at this time.

## 2023-02-28 NOTE — PATIENT DISCUSSION
Monitor. Topical Steroids Applications Pregnancy And Lactation Text: Most topical steroids are considered safe to use during pregnancy and lactation.  Any topical steroid applied to the breast or nipple should be washed off before breastfeeding.

## 2023-04-13 RX ORDER — NAPROXEN SODIUM 220 MG/1
81 TABLET, FILM COATED ORAL DAILY
Qty: 90 TABLET | Refills: 1 | Status: SHIPPED | OUTPATIENT
Start: 2023-04-13 | End: 2023-04-18 | Stop reason: SDUPTHER

## 2023-04-18 RX ORDER — NAPROXEN SODIUM 220 MG/1
81 TABLET, FILM COATED ORAL DAILY
Qty: 90 TABLET | Refills: 1 | Status: SHIPPED | OUTPATIENT
Start: 2023-04-18 | End: 2023-05-01 | Stop reason: SDUPTHER

## 2023-05-01 RX ORDER — NAPROXEN SODIUM 220 MG/1
81 TABLET, FILM COATED ORAL DAILY
Qty: 90 TABLET | Refills: 1 | Status: SHIPPED | OUTPATIENT
Start: 2023-05-01 | End: 2023-12-21

## 2023-05-12 ENCOUNTER — TELEPHONE (OUTPATIENT)
Dept: INTERNAL MEDICINE | Facility: CLINIC | Age: 73
End: 2023-05-12
Payer: COMMERCIAL

## 2023-05-12 DIAGNOSIS — M85.88 OSTEOPENIA OF LUMBAR SPINE: ICD-10-CM

## 2023-05-12 DIAGNOSIS — Z12.31 BREAST CANCER SCREENING BY MAMMOGRAM: Primary | ICD-10-CM

## 2023-05-12 NOTE — TELEPHONE ENCOUNTER
"----- Message from Carol Medina MA sent at 2023  9:51 AM EDT -----  Regarding: FW: Need order for bone scan and mammo   Contact: [phone]    ----- Message -----  From: Forrest Mueller \"Marquita\"  Sent: 2023   9:21 AM EDT  To: Adrianna Paulino [facility] Nurse  Subject: Need order for bone scan and mammo               Seeing Dr Martinez for first time .  Trying to schedule bone scan and mammo prior.  Both orders in system are .  Need new orders to make this happen.  Ty    "

## 2023-05-16 ENCOUNTER — TELEPHONE (OUTPATIENT)
Dept: INTERNAL MEDICINE | Facility: CLINIC | Age: 73
End: 2023-05-16
Payer: COMMERCIAL

## 2023-05-16 RX ORDER — METOPROLOL SUCCINATE 100 MG/1
100 TABLET, EXTENDED RELEASE ORAL DAILY
Qty: 90 TABLET | Refills: 3 | Status: SHIPPED | OUTPATIENT
Start: 2023-05-16 | End: 2023-05-17 | Stop reason: SDUPTHER

## 2023-05-16 NOTE — TELEPHONE ENCOUNTER
Pt needs refill on Metoprolol to go to [facility] in [place], accidentally went to [facility]

## 2023-05-17 RX ORDER — METOPROLOL SUCCINATE 100 MG/1
100 TABLET, EXTENDED RELEASE ORAL DAILY
Qty: 90 TABLET | Refills: 3 | Status: SHIPPED | OUTPATIENT
Start: 2023-05-17 | End: 2023-07-14 | Stop reason: SDUPTHER

## 2023-06-12 ENCOUNTER — TELEPHONE (OUTPATIENT)
Dept: INTERNAL MEDICINE | Facility: CLINIC | Age: 73
End: 2023-06-12
Payer: COMMERCIAL

## 2023-06-12 NOTE — TELEPHONE ENCOUNTER
Pt states she left a voice message earlier.    Covid symptoms began Sat  Tested positive today with at home test.  Would like paxlovid called in.    [facility] in [place]

## 2023-06-12 NOTE — TELEPHONE ENCOUNTER
rx sent  She should stop rosuvastatin while taking it  Can resume rosuvastatin 5 days after stopping

## 2023-06-20 ENCOUNTER — HOSPITAL ENCOUNTER (OUTPATIENT)
Dept: RADIOLOGY | Facility: CLINIC | Age: 73
Discharge: HOME | End: 2023-06-20
Attending: INTERNAL MEDICINE
Payer: COMMERCIAL

## 2023-06-20 DIAGNOSIS — Z12.31 BREAST CANCER SCREENING BY MAMMOGRAM: ICD-10-CM

## 2023-06-20 PROCEDURE — 77067 SCR MAMMO BI INCL CAD: CPT

## 2023-06-20 PROCEDURE — 77063 BREAST TOMOSYNTHESIS BI: CPT

## 2023-07-14 ENCOUNTER — OFFICE VISIT (OUTPATIENT)
Dept: INTERNAL MEDICINE | Facility: CLINIC | Age: 73
End: 2023-07-14
Payer: COMMERCIAL

## 2023-07-14 VITALS
TEMPERATURE: 98.6 F | HEART RATE: 80 BPM | DIASTOLIC BLOOD PRESSURE: 80 MMHG | BODY MASS INDEX: 29.16 KG/M2 | HEIGHT: 65 IN | SYSTOLIC BLOOD PRESSURE: 122 MMHG | WEIGHT: 175 LBS | OXYGEN SATURATION: 98 %

## 2023-07-14 DIAGNOSIS — F32.5 MAJOR DEPRESSIVE DISORDER WITH SINGLE EPISODE, IN FULL REMISSION (CMS/HCC): ICD-10-CM

## 2023-07-14 DIAGNOSIS — Z12.11 ENCOUNTER FOR SCREENING FOR MALIGNANT NEOPLASM OF COLON: ICD-10-CM

## 2023-07-14 DIAGNOSIS — E04.9 GOITER: ICD-10-CM

## 2023-07-14 DIAGNOSIS — M15.9 GENERALIZED OA: ICD-10-CM

## 2023-07-14 DIAGNOSIS — K21.9 GASTROESOPHAGEAL REFLUX DISEASE WITHOUT ESOPHAGITIS: ICD-10-CM

## 2023-07-14 DIAGNOSIS — E78.2 MIXED HYPERLIPIDEMIA: ICD-10-CM

## 2023-07-14 DIAGNOSIS — Z00.00 MEDICARE ANNUAL WELLNESS VISIT, SUBSEQUENT: Primary | ICD-10-CM

## 2023-07-14 DIAGNOSIS — I10 ESSENTIAL HYPERTENSION: ICD-10-CM

## 2023-07-14 DIAGNOSIS — E55.9 VITAMIN D DEFICIENCY: ICD-10-CM

## 2023-07-14 DIAGNOSIS — F34.1 PERSISTENT DEPRESSIVE DISORDER: ICD-10-CM

## 2023-07-14 DIAGNOSIS — I77.9 RIGHT-SIDED CAROTID ARTERY DISEASE, UNSPECIFIED TYPE (CMS/HCC): ICD-10-CM

## 2023-07-14 DIAGNOSIS — Z13.0 SCREENING, ANEMIA, DEFICIENCY, IRON: ICD-10-CM

## 2023-07-14 PROCEDURE — G0439 PPPS, SUBSEQ VISIT: HCPCS | Performed by: INTERNAL MEDICINE

## 2023-07-14 RX ORDER — PANTOPRAZOLE SODIUM 40 MG/1
40 TABLET, DELAYED RELEASE ORAL DAILY
Qty: 90 TABLET | Refills: 1 | Status: SHIPPED | OUTPATIENT
Start: 2023-07-14 | End: 2024-05-02 | Stop reason: SDUPTHER

## 2023-07-14 RX ORDER — NAPROXEN 500 MG/1
TABLET ORAL
COMMUNITY
Start: 2023-04-24 | End: 2023-07-14 | Stop reason: SDUPTHER

## 2023-07-14 RX ORDER — RAMIPRIL 10 MG/1
20 CAPSULE ORAL DAILY
Qty: 180 CAPSULE | Refills: 3 | Status: SHIPPED | OUTPATIENT
Start: 2023-07-14 | End: 2024-05-02 | Stop reason: SDUPTHER

## 2023-07-14 RX ORDER — HYDROCHLOROTHIAZIDE 25 MG/1
25 TABLET ORAL DAILY
Qty: 90 TABLET | Refills: 3 | Status: SHIPPED | OUTPATIENT
Start: 2023-07-14 | End: 2024-05-02 | Stop reason: SDUPTHER

## 2023-07-14 RX ORDER — ROSUVASTATIN CALCIUM 40 MG/1
40 TABLET, COATED ORAL
Qty: 90 TABLET | Refills: 3 | Status: SHIPPED | OUTPATIENT
Start: 2023-07-14 | End: 2024-05-02 | Stop reason: SDUPTHER

## 2023-07-14 RX ORDER — METOPROLOL SUCCINATE 100 MG/1
100 TABLET, EXTENDED RELEASE ORAL DAILY
Qty: 90 TABLET | Refills: 3 | Status: SHIPPED | OUTPATIENT
Start: 2023-07-14 | End: 2024-11-04 | Stop reason: SDUPTHER

## 2023-07-14 RX ORDER — NAPROXEN 500 MG/1
500 TABLET ORAL 2 TIMES DAILY WITH MEALS
Qty: 90 TABLET | Refills: 1 | Status: SHIPPED | OUTPATIENT
Start: 2023-07-14 | End: 2023-12-05

## 2023-07-14 RX ORDER — PAROXETINE HYDROCHLORIDE HEMIHYDRATE 12.5 MG/1
12.5 TABLET, FILM COATED, EXTENDED RELEASE ORAL EVERY MORNING
Qty: 90 TABLET | Refills: 1 | Status: SHIPPED | OUTPATIENT
Start: 2023-07-14 | End: 2024-02-07 | Stop reason: SDUPTHER

## 2023-07-14 RX ORDER — FAMOTIDINE 40 MG/1
40 TABLET, FILM COATED ORAL NIGHTLY
Qty: 90 TABLET | Refills: 3 | Status: SHIPPED | OUTPATIENT
Start: 2023-07-14 | End: 2024-05-02 | Stop reason: SDUPTHER

## 2023-07-14 ASSESSMENT — ENCOUNTER SYMPTOMS
FREQUENCY: 0
SHORTNESS OF BREATH: 0
DYSURIA: 0
BRUISES/BLEEDS EASILY: 0
EYE ITCHING: 0
HEADACHES: 0
NUMBNESS: 0
EYE PAIN: 0
APPETITE CHANGE: 0
DIZZINESS: 0
SORE THROAT: 0
ACTIVITY CHANGE: 0
ADENOPATHY: 0
WEAKNESS: 0
TREMORS: 0
EYE REDNESS: 0
COUGH: 0
RHINORRHEA: 0
SEIZURES: 0
FEVER: 0
WHEEZING: 0
SPEECH DIFFICULTY: 0
PHOTOPHOBIA: 0
EYE DISCHARGE: 0
UNEXPECTED WEIGHT CHANGE: 0
LIGHT-HEADEDNESS: 0
HEMATURIA: 0
CHILLS: 0
DIFFICULTY URINATING: 0
POLYPHAGIA: 0
SINUS PAIN: 0
DIAPHORESIS: 0
FATIGUE: 0
SINUS PRESSURE: 0

## 2023-07-14 ASSESSMENT — PATIENT HEALTH QUESTIONNAIRE - PHQ9: SUM OF ALL RESPONSES TO PHQ9 QUESTIONS 1 & 2: 0

## 2023-07-14 ASSESSMENT — MINI COG: TOTAL SCORE: 5

## 2023-07-14 NOTE — PATIENT INSTRUCTIONS
Your Personalized Prevention Plan Services (PPPS)    Preventive Services Checklist (Assumes Average Risk Unless Otherwise Noted):    Health Maintenance Topics with due status: Overdue       Topic Date Due    Depression Screening Never done    Falls Risk Screening Never done    Zoster Vaccine 12/31/2020    COVID-19 Vaccine 04/08/2021    Medicare Annual Wellness Visit 08/10/2022     Health Maintenance Topics with due status: Not Due       Topic Last Completion Date    DTaP, Tdap, and Td Vaccines 09/23/2015    Influenza Vaccine 10/01/2020    Colorectal Cancer Screening 08/11/2021    DEXA Scan 05/27/2022    Breast Cancer Screening 06/20/2023     Health Maintenance Topics with due status: Completed       Topic Last Completion Date    Pneumococcal (65 years and older) 03/02/2020     Health Maintenance Topics with due status: Aged Out       Topic Date Due    Meningococcal ACWY Aged Out    HIB Vaccines Aged Out    Hepatitis B Vaccines Aged Out    IPV Vaccines Aged Out    HPV Vaccines Aged Out       You May Be Eligible for These Additional Preventive Services   (Assumes Average Risk Unless Otherwise Noted)  Diabetes Screening Any 1 risk factor: hypertension, dyslipidemia, obesity, high glucose; or Any 2 risk factors: >=66yo, overweight, family history diabetes (covered every 6 months)   Hepatitis C Screening Any 1 risk factor: 1) blood transfusion before 1992,   2) current or past injection drug use (annually for high risk; if born between 3594-4833, see above for status).   Vaccine: Hepatitis B As necessary if at-risk: hemophilia, ESRD, diabetes, living with individual infected with hep B, healthcare worker with frequent contact with blood/bodily fluids (series covered once)   Sexually Transmitted Diseases (STDs) As necessary chlamydia, gonorrhea, syphilis, hepatitis B (covered annually)  HIV if any 1 risk factor present: 1) <16yo or >66yo and at increased risk or 2) 15-66yo and ask for it (covered  annually)   Lung Cancer Screening Low dose chest CT if all three risk factors: 1) 50-78yo, 2) smoker or quit within last 15y, 3) >=20 pack years (covered annually).  No results found for this or any previous visit.       Cholesterol Screening Both risk factors: 1) >=21yo and 2)  increased risk coronary artery disease (covered every 5 years).     Breast Cancer Screening Covered once 35-40yo, annually >=39yo (if >=49yo, see above for status).         Health Risk Factors with Personalized Education:  ----------------------------------------------------------------------------------------------------------------------  Stress management:  Understanding Your Stress  · Think about how you know when you’re stressed.  · Think about how your thoughts or behaviors are different when you’re stressed.  · Think about what triggers stress for you.  · Think about how you handle stress, and whether you’re making unhealthy choices in response to stress (like smoking, drinking alcohol or overeating).  Managing Stress  · Take a break from the stressful situation.  · Reduce your stress levels by exercising, talking with family/friends, ensuring adequate sleep.  · Consider meditation or yoga.  · Make sure you plan time to do things you enjoy.  · Let your PCP know if you’re having problems limiting your stress.  ----------------------------------------------------------------------------------------------------------------------  Controlling Your Blood Pressure  · Maintain a normal weight (body mass index between 18.5 and 24.9).  · Eat more fruit, vegetables and low-fat dairy.  · Eat less saturated fat and total fat.  · Lower your sodium (salt) intake.  Try to stay under 1500 mg per day, but if you cannot get your intake to be that low, at least lower it by 1000 mg.  · Stay active.  Try to get at least 90 to 150 minutes of exercise per week.  Try brisk walking, swimming, bicycling or dancing.  · Limit alcohol intake.  When you do consume  alcohol, drink no more than 1 drink per day.  · If you have been prescribed medication, take it regularly and exactly as prescribed.  Let your PCP know if you have any problems or questions about your medication.  · Check your blood pressure at home or at the store.  Write down your readings and share them with your PCP  ----------------------------------------------------------------------------------------------------------------------  Controlling Your Diabetes  · Stress can raise your blood sugar.  Learn what causes your stress.  Learn to lower your stress by spending time with family and friends, exercising, deep breathing, trying meditation or yoga, enjoying hobbies and getting enough rest.  Let your PCP know if you’re having problems limiting your stress.  · Maintain a healthy weight by balancing your diet and exercise.  · Choose foods that are lower in calories, saturated fat, trans fat, sugar and salt.  Eat foods with more fiber, like whole grain cereals, bread, crackers, rice or pasta.  Choose to eat fruit, vegetables, and low-fat or fat-free/skim dairy.  · Reduce the portion size of your meals.  Make half of your meal vegetables and fruit, and divide the other half between lean protein and whole grains.  · Drink water instead of juice and regular soda.  · Spend at least some time being active on most days of the week.  · Work to increase your muscle strength at least twice per week.  Try things like light weights, stretch bands, yoga, heavy gardening (digging, planting with tools) or push-ups.  · If you have been prescribed medication, take it regularly and exactly as prescribed.  Let your PCP know if you have any problems or questions about your medication.  · If you have been asked to check your blood sugar, write down your readings and share them with your PCP  ----------------------------------------------------------------------------------------------------------------------  Controlling Your  Cholesterol  · Reduce the amount of saturated and trans fat in your diet.  Limit intake of red meat.  Consume only low-fat or non-fat/skim dairy.  Limit fried food.  Cook with vegetable oils.  · Reduce your intake of sugary foods, sugary drinks and alcohol.  · Eat a diet high in fruit, vegetables and whole grains.  · Get protein from fish, poultry and a small portion of nuts.  · Stay active.  Try to get at least 90 to 150 minutes of exercise per week.  Try brisk walking, swimming, bicycling or dancing.  · Maintain a healthy weight by balancing your diet and exercise.  · If you have been prescribed medication, take it regularly and exactly as prescribed. Let your PCP know if you have any problems or questions about your medication.  · It’s important to know your cholesterol numbers.  When recommended by your PCP, get the cholesterol blood test.  ----------------------------------------------------------------------------------------------------------------------  Controlling Your Osteopenia, Strengthening Your Bones  · Try to get at least 90 to 150 minutes of weight-bearing exercise per week.  · Ensure intake of at least 1200mg of calcium per day.  Eat foods high in calcium like milk and other dairy, green vegetables, fruit, canned fish with soft and edible bones, nuts, calcium-set tofu.  Some foods are calcium-fortified, like bread, cereal, fruit juices and mineral water.  · Help your body make vitamin D by getting 10-15 minutes per day of sunlight.    · Ensure intake of at least 600IU of vitamin D per day.  Eat foods high in vitamin D like oily fish (salmon, sardines, mackerel) and eggs.  Some foods are fortified with vitamin D, like dairy and cereals.  · Avoid high amounts of caffeine and salt, since they can cause the body to loose calcium.  · Limit alcohol intake, since it is associated with weaker bones and is associated with falls and fractures.  · Limit intake of fizzy drinks.  · If you have been prescribed  medication, take it regularly and exactly as prescribed.  Let your PCP know if you have any problems or questions about your medication  ----------------------------------------------------------------------------------------------------------------------  Reducing Your Risk of Falls  · Tell your PCP if any of your medications make you feel tired, dizzy, lightheaded or off-balance.  · Maintain coordination, flexibility and balance by ensuring regular physical activity.  · Limit alcohol intake to 1 drink per day.  Consider avoiding all alcohol intake.  · Ensure good vision.  Visit an ophthalmologist or optometrist regularly for vision screening or to make sure your glasses / contact lens prescription is correct.  If you need glasses or contacts, wear them.  When you get new glasses or contacts, take time to get used to them.  Do not wear sunglasses or tinted lenses when indoors.  · Ensure good hearing.  Have your hearing checked if you are having trouble hearing, or family and friends think you cannot hear them.  If you need a hearing aid, be sure it fits well and wear it.  · Get enough rest.  Ensure about 7-9 hours of sleep every day.  · Get up slowly from your bed or chairs.  Do not start walking until you are sure you feel steady.  · Wear non-skid, rubber-soled, low-heeled shoes.  Do not walk in socks, or in shoes and slippers with smooth soles.  · If your PCP or therapist recommends using a cane or walker, use it regularly.  · Make your home safer.  Increase lighting throughout the house, especially at the top and bottom of stairs.  Ensure lighting is easily turned on when getting up in the middle of the night.  Make sure there are two secure rails on all stairs.  Install grab bars in the bathtub / shower and near the toilet.  Consider using a shower chair and / or a hand-held shower.  · Spread sand or salt on icy surfaces.  Beware of wet surfaces, which can be icy.  · Tell your PCP if you have fallen.

## 2023-07-14 NOTE — PROGRESS NOTES
Subjective     Marquita Mueller is a 72 y.o. female who presents for a subsequent annual wellness visit.     Patient Care Team:  Juan Martinez MD as PCP - General (Internal Medicine)    Comprehensive Medical and Social History  Patient Active Problem List   Diagnosis   • Blood glucose abnormal   • Chronic coronary artery disease   • Basal cell carcinoma   • Chicken pox   • Right-sided carotid artery disease, unspecified type (CMS/HCC)   • Diverticulosis   • Endometriosis   • Family history of malignant neoplasm of breast   • Family history of cardiovascular disease   • Goiter   • Hemorrhoids   • History of non anemic vitamin B12 deficiency   • Essential hypertension   • Hyperlipidemia   • Osteoarthritis   • Osteopenia   • Thyroid nodule   • Peptic ulcer disease   • Breast pain, left   • Major depressive disorder with single episode, in full remission (CMS/[facility])   • Medicare annual wellness visit, subsequent   • Sciatica of left side     Past Medical History:   Diagnosis Date   • Basal cell carcinoma    • Depression    • Diverticulitis of colon    • Endometrioma    • Hypertension    • Lipid disorder    • Osteoarthritis    • Thyroid nodule      Past Surgical History:   Procedure Laterality Date   • DILATION AND CURETTAGE OF UTERUS     • HYSTERECTOMY     • LAPAROTOMY OOPHERECTOMY     • WRIST SURGERY Right      Allergies   Allergen Reactions   • No Known Allergies      Current Outpatient Medications   Medication Sig Dispense Refill   • aspirin 81 mg chewable tablet Take 1 tablet (81 mg total) by mouth daily. 90 tablet 1   • calcium carbonate/vitamin D3 (CALCIUM 500 WITH D ORAL)      • celecoxib 100 mg capsule TAKE 1 CAPSULE TWICE A  capsule 3   • famotidine (PEPCID) 40 mg tablet TAKE 1 TABLET NIGHTLY 90 tablet 3   • hydrochlorothiazide (HYDRODIURIL) 25 mg tablet TAKE 1 TABLET DAILY 90 tablet 3   • hydrocortisone (ANUSOL-HC) 25 mg suppository Insert 1 suppository (25 mg total) into the rectum 2 (two) times a day  "as needed for hemorrhoids. 20 suppository 0   • metoprolol succinate XL (TOPROL-XL) 100 mg 24 hr tablet Take 1 tablet (100 mg total) by mouth daily. 90 tablet 3   • pantoprazole (PROTONIX) 40 mg EC tablet Take 1 tablet (40 mg total) by mouth daily. 90 tablet 1   • PARoxetine CR (PAXIL CR) 12.5 mg 24 hr tablet Take 1 tablet (12.5 mg total) by mouth every morning. 90 tablet 1   • ramipriL (ALTACE) 10 mg capsule TAKE 2 CAPSULES ONCE DAILY 180 capsule 3   • rosuvastatin (CRESTOR) 40 mg tablet Take 1 tablet (40 mg total) by mouth once daily. 90 tablet 3   • ezetimibe (ZETIA) 10 mg tablet Take 1 tablet (10 mg total) by mouth once daily. 90 tablet 3   • fluticasone propionate (FLONASE) 50 mcg/actuation nasal spray      • naproxen (NAPROSYN) 500 mg tablet        No current facility-administered medications for this visit.     Social History     Tobacco Use   • Smoking status: Never   • Smokeless tobacco: Never     Family History   Problem Relation Age of Onset   • Breast cancer Biological Mother 80   • Hypertension Biological Mother    • Stroke Biological Mother    • Colon cancer Biological Father    • Hypertension Biological Father    • Stroke Paternal Grandmother    • Colon cancer Father's Brother    • Colon cancer Cousin    • Stroke Father's Sister        Objective   Vitals  Vitals:    07/14/23 1414   BP: 122/80   BP Location: Left upper arm   Patient Position: Sitting   Pulse: 80   Temp: 37 °C (98.6 °F)   TempSrc: Oral   SpO2: 98%   Weight: 79.4 kg (175 lb)   Height: 1.65 m (5' 4.96\")     Body mass index is 29.16 kg/m².    Advanced Care Plan  Does patient have advance directive?: Yes       Patient has Advance Directive: Advance Directive is NOT in chart, requested to bring in                             PHQ  Will the patient answer the depression questions?: Yes   Little interest or pleasure in doing things: Not at all   Feeling down, depressed, or hopeless: Not at all   Depression Risk: 0                                "              Mini Cog  Score: 5  Result: Negative      Get Up and Go  Result: Pass    STEADI Falls Risk  One or more falls in the last year: No                                           Worried about falling: No                 Hearing and Vision Screening  Hearing Screening - Comments:: pass  Vision Screening - Comments:: Pt sees eye dr yearly   See HRA for relevant hearing screening response.      Subjective     Patient ID: Marquita Mueller is a 72 y.o. female.    72 years old female came for Medicare wellness visit.  Patient exercise daily.  Also lift weights.  Patient has up-to-date mammogram and bone density test.  Lumbar T score was -1.5.  Patient is aware of the results.  Patient follows up with dermatologist every 6 months.  Patient saw ophthalmologist in January, 2023.      Review of Systems   Constitutional: Negative for activity change, appetite change, chills, diaphoresis, fatigue, fever and unexpected weight change.   HENT: Negative for congestion, dental problem, drooling, ear pain, hearing loss, postnasal drip, rhinorrhea, sinus pressure, sinus pain, sneezing and sore throat.    Eyes: Negative for photophobia, pain, discharge, redness, itching and visual disturbance.   Respiratory: Negative for cough, shortness of breath and wheezing.    Cardiovascular:        Patient has hypertension and hyperlipidemia..  Denies headache, chest pain, shortness of breath.  Needs prescription refills.   Gastrointestinal:        Patient has GERD.  Taking pantoprazole and famotidine.  Denies epigastric discomfort, passing black color stool.   Endocrine: Negative for cold intolerance, heat intolerance, polyphagia and polyuria.   Genitourinary: Negative for difficulty urinating, dysuria, frequency and hematuria.        Status post PORTIA and BSO.   Musculoskeletal:        Patient has generalized osteoarthritis.  Mainly at bilateral hands.  She was on naproxen 500 mg 2 times a day in the past which resulted in gastric  "ulcer, EGD.  Patient was placed on Celebrex which did not control the symptoms.    Patient lives in [place] during summertime.  She asked the medical provider to restart on naproxen 500 mg.  She has been taking the medication with food without any symptoms.     Skin:        Patient has extensive freckles and follow-up with dermatologist every 6 months.   Allergic/Immunologic: Negative for environmental allergies, food allergies and immunocompromised state.   Neurological: Negative for dizziness, tremors, seizures, speech difficulty, weakness, light-headedness, numbness and headaches.        Strong family history of CVA.  Mother had CVA at 74 years old, maternal grandmother also had CVA.  Paternal grandmother had CVA at 69 years old.  Paternal aunt had CVA at 74 years old.   Hematological: Negative for adenopathy. Does not bruise/bleed easily.   Psychiatric/Behavioral:        Patient has depression.  Taking Paxil 12.5 mg with controlled symptoms.  Needs prescription refill.       Objective     Vitals:    07/14/23 1414   BP: 122/80   BP Location: Left upper arm   Patient Position: Sitting   Pulse: 80   Temp: 37 °C (98.6 °F)   TempSrc: Oral   SpO2: 98%   Weight: 79.4 kg (175 lb)   Height: 1.65 m (5' 4.96\")     Body mass index is 29.16 kg/m².    Physical Exam  Nursing note reviewed.   Constitutional:       Appearance: Normal appearance.   HENT:      Head: Normocephalic.      Mouth/Throat:      Mouth: Mucous membranes are moist.   Eyes:      Extraocular Movements: Extraocular movements intact.      Conjunctiva/sclera: Conjunctivae normal.      Pupils: Pupils are equal, round, and reactive to light.   Cardiovascular:      Rate and Rhythm: Normal rate and regular rhythm.      Pulses: Normal pulses.           Dorsalis pedis pulses are 2+ on the right side and 2+ on the left side.        Posterior tibial pulses are 2+ on the left side.   Pulmonary:      Effort: Pulmonary effort is normal.      Breath sounds: Normal breath " sounds.   Chest:   Breasts:     Right: No swelling, bleeding, inverted nipple, mass, nipple discharge or skin change.      Left: No swelling, bleeding, inverted nipple, mass, nipple discharge, skin change or tenderness.   Abdominal:      General: Abdomen is flat. Bowel sounds are normal.      Palpations: Abdomen is soft.   Musculoskeletal:         General: No swelling, tenderness or deformity. Normal range of motion.      Cervical back: Normal range of motion and neck supple.      Right lower leg: No edema.      Left lower leg: No edema.      Right foot: Normal range of motion. No deformity, Charcot foot, foot drop or prominent metatarsal heads.      Left foot: Normal range of motion. No deformity, Charcot foot, foot drop or prominent metatarsal heads.      Comments: Chronic arthritis changes present.  There is no redness, swelling.   Feet:      Right foot:      Protective Sensation: 5 sites tested. 5 sites sensed.      Skin integrity: Skin integrity normal. No ulcer, blister, skin breakdown, erythema, warmth or fissure.      Left foot:      Protective Sensation: 5 sites tested. 5 sites sensed.      Skin integrity: Skin integrity normal. No ulcer, blister, skin breakdown, erythema, warmth or fissure.   Lymphadenopathy:      Upper Body:      Right upper body: No supraclavicular, axillary or pectoral adenopathy.      Left upper body: No supraclavicular, axillary or pectoral adenopathy.   Skin:     General: Skin is warm.      Comments: Freckle present on face, neck, bilateral extremities.   Neurological:      General: No focal deficit present.      Mental Status: She is alert and oriented to person, place, and time. Mental status is at baseline.      Cranial Nerves: No cranial nerve deficit.      Sensory: No sensory deficit.      Gait: Gait normal.      Deep Tendon Reflexes: Reflexes normal.   Psychiatric:         Mood and Affect: Mood normal.         Behavior: Behavior normal.       1. Medicare annual wellness visit,  subsequent    72 years old female with controlled hypertension, hyperlipidemia, depression, GERD and generalized osteoarthritis.  Patient is physically active.  Follows up with dermatologist, ophthalmologist regularly.  Mammogram and bone density test are up-to-date.  Patient is aware of the results.  Patient is to check below mention blood work.    We will call patient with the results, if further management is needed.    All the medication refills listed below dispensed.    2. Essential hypertension    - metoprolol succinate XL (TOPROL-XL) 100 mg 24 hr tablet; Take 1 tablet (100 mg total) by mouth daily.  Dispense: 90 tablet; Refill: 3  - hydrochlorothiazide (HYDRODIURIL) 25 mg tablet; Take 1 tablet (25 mg total) by mouth daily.  Dispense: 90 tablet; Refill: 3  - ramipriL (ALTACE) 10 mg capsule; Take 2 capsules (20 mg total) by mouth daily.  Dispense: 180 capsule; Refill: 3  - Comprehensive metabolic panel; Future  - Microalbumin/Creatinine Ur Random; Future    3. Vitamin D deficiency    - Vitamin D 25 hydroxy; Future    4. Screening, anemia, deficiency, iron    - CBC and Differential; Future    5. Goiter    - TSH w reflex FT4; Future    6. Gastroesophageal reflux disease without esophagitis    - famotidine (PEPCID) 40 mg tablet; Take 1 tablet (40 mg total) by mouth nightly.  Dispense: 90 tablet; Refill: 3  - pantoprazole (PROTONIX) 40 mg EC tablet; Take 1 tablet (40 mg total) by mouth daily.  Dispense: 90 tablet; Refill: 1    8. Persistent depressive disorder    - PARoxetine CR (PAXIL CR) 12.5 mg 24 hr tablet; Take 1 tablet (12.5 mg total) by mouth every morning.  Dispense: 90 tablet; Refill: 1    9. Mixed hyperlipidemia    - rosuvastatin (CRESTOR) 40 mg tablet; Take 1 tablet (40 mg total) by mouth once daily.  Dispense: 90 tablet; Refill: 3  - Lipid panel; Future    10. Generalized OA    Patient is to try Voltaren gel as directed.  Sample provided.  If medication helps patient is to call and will give  prescription.  Use naproxen as a backup, in light of history of GI bleed.    - naproxen (NAPROSYN) 500 mg tablet; Take 1 tablet (500 mg total) by mouth 2 (two) times a day with meals.  Dispense: 90 tablet; Refill: 1    11. Encounter for screening for malignant neoplasm of colon    - FIT Medicare; Future    12. Major depressive disorder with single episode, in full remission (CMS/HCC)  As above.    13. Right-sided carotid artery disease, unspecified type (CMS/HCC)    Stable.      Juan Martinez MD    See Patient Instructions (the written plan) which was given to the patient for PPPS and health risk factors with interventions.

## 2023-07-26 ENCOUNTER — TELEPHONE (OUTPATIENT)
Dept: INTERNAL MEDICINE | Facility: CLINIC | Age: 73
End: 2023-07-26
Payer: COMMERCIAL

## 2023-07-26 NOTE — TELEPHONE ENCOUNTER
[place] in the garden where she believes she was bit by a bug by her left eye. Pt states her eye is swollen. Pt states what when this happened before Dr. Montanez would send her in a steroid pack.    [facility]

## 2023-07-26 NOTE — TELEPHONE ENCOUNTER
Not appropriate to send in treatment without evaluation  Please recommend urgent care close to where she is now. Many [facility] have minute clinics.

## 2023-11-20 ENCOUNTER — TELEPHONE (OUTPATIENT)
Dept: CARDIOLOGY | Facility: CLINIC | Age: 73
End: 2023-11-20
Payer: COMMERCIAL

## 2023-11-27 ENCOUNTER — OFFICE VISIT (OUTPATIENT)
Dept: CARDIOLOGY | Facility: CLINIC | Age: 73
End: 2023-11-27
Payer: COMMERCIAL

## 2023-11-27 VITALS
WEIGHT: 178.8 LBS | DIASTOLIC BLOOD PRESSURE: 92 MMHG | HEART RATE: 69 BPM | BODY MASS INDEX: 30.52 KG/M2 | HEIGHT: 64 IN | SYSTOLIC BLOOD PRESSURE: 192 MMHG | OXYGEN SATURATION: 95 %

## 2023-11-27 DIAGNOSIS — R01.1 MURMUR: ICD-10-CM

## 2023-11-27 DIAGNOSIS — E78.5 DYSLIPIDEMIA: Primary | ICD-10-CM

## 2023-11-27 DIAGNOSIS — I10 PRIMARY HYPERTENSION: ICD-10-CM

## 2023-11-27 DIAGNOSIS — I77.9 RIGHT-SIDED CAROTID ARTERY DISEASE, UNSPECIFIED TYPE (CMS/HCC): ICD-10-CM

## 2023-11-27 DIAGNOSIS — I65.29 CAROTID ATHEROSCLEROSIS, UNSPECIFIED LATERALITY: ICD-10-CM

## 2023-11-27 PROCEDURE — 99205 OFFICE O/P NEW HI 60 MIN: CPT | Performed by: INTERNAL MEDICINE

## 2023-11-27 PROCEDURE — 3080F DIAST BP >= 90 MM HG: CPT | Performed by: INTERNAL MEDICINE

## 2023-11-27 PROCEDURE — 93000 ELECTROCARDIOGRAM COMPLETE: CPT | Performed by: INTERNAL MEDICINE

## 2023-11-27 PROCEDURE — 3008F BODY MASS INDEX DOCD: CPT | Performed by: INTERNAL MEDICINE

## 2023-11-27 PROCEDURE — 3077F SYST BP >= 140 MM HG: CPT | Performed by: INTERNAL MEDICINE

## 2023-11-27 NOTE — PROGRESS NOTES
[facility]  [place] Office Visit       Patient ID: Forrest Mueller 73 y.o. female 1950  PCP: Juan Martinez MD      HPI     Forrest Mueller is a 73 y.o. female presenting for initial cardiovascular evaluation.    She has a past medical history significant for hypertension, dyslipidemia with CAC 0 (April 2022), mild carotid disease, impaired fasting glucose, GERD, diverticulosis, thyroid nodule, depression, and family history of CVA.     She presents today for initial cardiac evaluation, primarily for her risk factors.  Her biggest concern is that there is a strong family history of strokes and vascular dementia in multiple family members and she wants to be sure that her risk factors are adequately controlled to prevent any problems moving forward.    She describes to me that she has had hypertension since about the age of 41 and has been on medications for many years since then.  Typically her blood pressure at home is in the 130s over 80s and she is quite shocked to see that her readings in the office today are significantly elevated.  She says that this is quite atypical for her and not something that she has ever seen before.  She has been on her current combination of blood pressure medications for a long time with good results.    She notes that she also has been on Crestor for hyperlipidemia for many years and was also previously on Zetia which was stopped.    She has been found in the past to have mild carotid disease by carotid ultrasound, which presumably was initially checked due to risk factors.  She has not had personal history of stroke or TIA.    She has never had any personal cardiac history.  She has not needed to see a cardiologist in the past.  She had stress testing in the remote past which was unremarkable.    She does not have any significant cardiovascular symptoms.  She denies any chest pain at any time.  She does not get shortness of breath at rest or with  exertion.  She denies palpitations, syncope, lower extremity edema, or claudication.  She will occasionally get some leg cramping but just at night when she is in bed.  She does not have any overt neurologic complaints or deficits but says that she does have occasional word finding difficulty which she attributes to her age.  She typically has excellent exercise capacity and is able to walk up for 5 flights of stairs without any significant difficulty.    She is a lifelong non-smoker.  She has a glass of wine most days.  She does not use illicit substances or any stimulants.    Her mother had stroke.  Her maternal aunts had vascular dementia and maternal grandmother also had a stroke.  Her father had hypertension and colon cancer.  Paternal aunts had strokes and hypertension and paternal grandmother had cerebral hemorrhage.  She has 1 brother who is alive and well.  Another brother  of unclear reasons but likely had cardiovascular disease.    She has been taking primary prevention aspirin for many years.     Past History      Past Medical History:   Diagnosis Date    Basal cell carcinoma     Depression     Diverticulitis of colon     Endometrioma     Hypertension     Lipid disorder     Osteoarthritis     Thyroid nodule        Past Surgical History:   Procedure Laterality Date    DILATION AND CURETTAGE OF UTERUS      HYSTERECTOMY      LAPAROTOMY OOPHERECTOMY      WRIST SURGERY Right        Social History     Social History Narrative    Not on file       Family History   Problem Relation Age of Onset    Breast cancer Biological Mother 80    Hypertension Biological Mother     Stroke Biological Mother     Colon cancer Biological Father     Hypertension Biological Father     Stroke Paternal Grandmother     Colon cancer Father's Brother     Colon cancer Cousin     Stroke Father's Sister         Medications     Current Outpatient Medications   Medication Instructions    aspirin 81 mg, oral, Daily  "   calcium carbonate/vitamin D3 (CALCIUM 500 WITH D ORAL) No dose, route, or frequency recorded.    celecoxib 100 mg capsule TAKE 1 CAPSULE TWICE A DAY    famotidine (PEPCID) 40 mg, oral, Nightly    fluticasone propionate (FLONASE) 50 mcg/actuation nasal spray No dose, route, or frequency recorded.    hydrochlorothiazide (HYDRODIURIL) 25 mg, oral, Daily    hydrocortisone (ANUSOL-HC) 25 mg, rectal, 2 times daily PRN    metoprolol succinate XL (TOPROL-XL) 100 mg, oral, Daily    naproxen (NAPROSYN) 500 mg, oral, 2 times daily with meals    pantoprazole (PROTONIX) 40 mg, oral, Daily    PARoxetine CR (PAXIL CR) 12.5 mg, oral, Every morning    ramipriL (ALTACE) 20 mg, oral, Daily    rosuvastatin (CRESTOR) 40 mg, oral, Daily (8a)         Allergies     No known allergies     Vital Signs/Exam     Vitals:    11/27/23 1411   BP: (!) 192/92   BP Location: Left upper arm   Patient Position: Sitting   Pulse: 69   SpO2: 95%   Weight: 81.1 kg (178 lb 12.8 oz)   Height: 1.626 m (5' 4\")     BP Readings from Last 3 Encounters:   11/27/23 (!) 192/92   07/14/23 122/80   05/17/22 128/78     Wt Readings from Last 3 Encounters:   11/27/23 81.1 kg (178 lb 12.8 oz)   07/14/23 79.4 kg (175 lb)   05/17/22 77.3 kg (170 lb 6.4 oz)        Gen: no distress  HEENT: no jvd  Lungs: clear bilaterally; no crackles, rhonchi, wheezing  Chest wall: no tenderness  Heart: regular rate and rhythm; 1/6 EMMANUEL at RUSB  Abdomen: nondistended, nontender  Extremities: no edema  Neuro: nonfocal, alert and oriented  Psych: normal mood and affect     Diagnostic Data   Diagnostic data personally reviewed. Available medical records were reviewed and updated where appropriate including laboratory data, imaging studies, and previous outpatient and inpatient records.  Counseling/education performed.     Labs:  Lab Results   Component Value Date    WBC 8.3 05/06/2021    HGB 14.2 05/06/2021     05/06/2021    ALT 19 05/06/2021    AST 19 05/06/2021     " 05/06/2021    K 5.2 05/06/2021     05/06/2021    CREATININE 1.08 (H) 05/06/2021    BUN 27 05/06/2021    CO2 23 05/06/2021    TSH 0.76 09/29/2020    HGBA1C 6.1 (H) 09/29/2020     Lab Results   Component Value Date    CHOL 172 09/29/2020    CHOL 154 05/20/2019    LDLCALC 85 09/29/2020    LDLCALC 84 05/20/2019    HDL 57 09/29/2020    HDL 55 05/20/2019    TRIG 148 09/29/2020    TRIG 74 05/20/2019       Echocardiogram:      Stress Tests:          CT Calcium Score:  4/5/2022: CAC 0      Cardiac cath:      Vascular Studies:  Carotid ultrasound 6/20/2022: No evidence of significant carotid atherosclerosis bilaterally.    Carotid ultrasound 6/4/2021:  Right: Grayscale evaluation demonstrates mild calcified atherosclerotic disease within the right carotid bifurcation.  Peak systolic velocities is elevated to 131 cm/s in the proximal internal carotid artery. Based upon peak flow velocity and ratio criteria, internal carotid stenosis is estimated at 50-69 % on the right side.  Left: Grayscale evaluation demonstrates minimal atherosclerotic disease within the left carotid bifurcation.   Peak systolic velocities and spectral waveforms are within normal limits. Based upon peak flow velocity and ratio criteria, internal carotid stenosis is estimated at 0-49% on the left side.    Abdominal aortic ultrasound 5/26/2021: No evidence of abdominal aortic aneurysm    Carotid ultrasound 6/10/2019: No evidence of significant carotid atherosclerosis bilaterally.    Carotid ultrasound 11/7/2017: No hemodynamically significant narrowing of the carotid arteries.    Carotid ultrasound 6/2/2016: No evidence for hemodynamically significant stenosis within the  bilateral carotid arteries.  There is been no significant interval change when compared to the prior study from 2015. Bilateral antegrade flow within the vertebral arteries.      Peripheral:  No results found for this or any previous visit from the past 365 days.      Cardiac  monitoring:      ECG independently reviewed: NSR, MIGUEL ÁNGEL, RSR'     Assessment and Plan      Forrest Mueller is a 73 y.o. female who presents today for initial office evaluation.     Diagnosis Plan   1. Dyslipidemia  ECG 12 LEAD-OFFICE PERFORMED    Transthoracic echo (TTE) complete      2. Primary hypertension  Transthoracic echo (TTE) complete      3. Right-sided carotid artery disease, unspecified type (CMS/HCC)  Ultrasound carotid bilateral      4. Carotid atherosclerosis, unspecified laterality  Ultrasound carotid bilateral      5. Murmur            Hypertension  Her blood pressure typically is apparently under good control on her current medication, which has been stable for many years.  Blood pressure in the office today is significantly elevated and was at a similar reading on recheck.  She is not having any symptoms attributable to high blood pressure currently.  -As it seems that this is quite out of character for her and she has primary care follow-up in a week I am not can to change any of her medications for now but asked her to check her blood pressure frequently over the next week and record her findings to bring into her upcoming primary care visit.  -We will check echocardiogram in the context of chronic hypertension.  -We will consider exercise testing to assess blood pressure response to exercise but will hold off on this for now.    Hyperlipidemia  Mild carotid disease  Coronary calcium score of 0  She has longstanding hyperlipidemia on Crestor 40 mg daily.  She was previously on Zetia.  In the context of mild carotid disease goal LDL should be under 70.  Calcium score is 0.  -Continue statin at current dose.  -Blood work is pending.  If LDL is over 70 would reinitiate Zetia.  -We will recheck carotid ultrasound.  -Continue aspirin in context of mild carotid disease despite calcium score of 0.    Murmur  She has a soft systolic ejection murmur that is likely benign.  We will be getting an  echocardiogram anyway in the context of hypertensive disease.      She is leaving for [place] for the winter in about a month.  I asked her to be sure that she checks in with me prior to that to be sure that her blood pressure is in fact under control.  If it is not then changes will need to be made.  Assuming things look good with her echo, carotid ultrasound, and blood pressure, I will plan to see her in the spring when she is back from [place].      Thank you for allowing me to participate in the care of this patient.     Available medical records were reviewed and updated where appropriate including laboratory data, imaging studies, and previous outpatient and inpatient records.  Counseling/education performed.      Westley Squires MD, [facility] Office: [phone]  [facility]: [facility]     This document was generated utilizing voice recognition technology, typographical errors may be present.

## 2023-11-27 NOTE — LETTER
November 27, 2023     Juan Martinez MD  [address]  [place] [address]    Patient: Forrest Mueller  YOB: 1950  Date of Visit: 11/27/2023      Dear Dr. Martinez:    Thank you for referring Forrest Mueller to me for evaluation. Below are my notes for this consultation.    If you have questions, please do not hesitate to call me. I look forward to following your patient along with you.         Sincerely,        Westley Squires MD        CC: No Recipients    Westley Squires MD  11/27/2023  4:11 PM  Signed     [facility]  [place] Office Visit       Patient ID: Forrest Mueller 73 y.o. female 1950  PCP: Juan Martinez MD      HPI     Forrest Mueller is a 73 y.o. female presenting for initial cardiovascular evaluation.    She has a past medical history significant for hypertension, dyslipidemia with CAC 0 (April 2022), mild carotid disease, impaired fasting glucose, GERD, diverticulosis, thyroid nodule, depression, and family history of CVA.     She presents today for initial cardiac evaluation, primarily for her risk factors.  Her biggest concern is that there is a strong family history of strokes and vascular dementia in multiple family members and she wants to be sure that her risk factors are adequately controlled to prevent any problems moving forward.    She describes to me that she has had hypertension since about the age of 41 and has been on medications for many years since then.  Typically her blood pressure at home is in the 130s over 80s and she is quite shocked to see that her readings in the office today are significantly elevated.  She says that this is quite atypical for her and not something that she has ever seen before.  She has been on her current combination of blood pressure medications for a long time with good results.    She notes that she also has been on Crestor for hyperlipidemia for many years and was also  previously on Zetia which was stopped.    She has been found in the past to have mild carotid disease by carotid ultrasound, which presumably was initially checked due to risk factors.  She has not had personal history of stroke or TIA.    She has never had any personal cardiac history.  She has not needed to see a cardiologist in the past.  She had stress testing in the remote past which was unremarkable.    She does not have any significant cardiovascular symptoms.  She denies any chest pain at any time.  She does not get shortness of breath at rest or with exertion.  She denies palpitations, syncope, lower extremity edema, or claudication.  She will occasionally get some leg cramping but just at night when she is in bed.  She does not have any overt neurologic complaints or deficits but says that she does have occasional word finding difficulty which she attributes to her age.  She typically has excellent exercise capacity and is able to walk up for 5 flights of stairs without any significant difficulty.    She is a lifelong non-smoker.  She has a glass of wine most days.  She does not use illicit substances or any stimulants.    Her mother had stroke.  Her maternal aunts had vascular dementia and maternal grandmother also had a stroke.  Her father had hypertension and colon cancer.  Paternal aunts had strokes and hypertension and paternal grandmother had cerebral hemorrhage.  She has 1 brother who is alive and well.  Another brother  of unclear reasons but likely had cardiovascular disease.    She has been taking primary prevention aspirin for many years.     Past History      Past Medical History:   Diagnosis Date    Basal cell carcinoma     Depression     Diverticulitis of colon     Endometrioma     Hypertension     Lipid disorder     Osteoarthritis     Thyroid nodule        Past Surgical History:   Procedure Laterality Date    DILATION AND CURETTAGE OF UTERUS      HYSTERECTOMY      LAPAROTOMY  "OOPHERECTOMY      WRIST SURGERY Right        Social History     Social History Narrative    Not on file       Family History   Problem Relation Age of Onset    Breast cancer Biological Mother 80    Hypertension Biological Mother     Stroke Biological Mother     Colon cancer Biological Father     Hypertension Biological Father     Stroke Paternal Grandmother     Colon cancer Father's Brother     Colon cancer Cousin     Stroke Father's Sister         Medications     Current Outpatient Medications   Medication Instructions    aspirin 81 mg, oral, Daily    calcium carbonate/vitamin D3 (CALCIUM 500 WITH D ORAL) No dose, route, or frequency recorded.    celecoxib 100 mg capsule TAKE 1 CAPSULE TWICE A DAY    famotidine (PEPCID) 40 mg, oral, Nightly    fluticasone propionate (FLONASE) 50 mcg/actuation nasal spray No dose, route, or frequency recorded.    hydrochlorothiazide (HYDRODIURIL) 25 mg, oral, Daily    hydrocortisone (ANUSOL-HC) 25 mg, rectal, 2 times daily PRN    metoprolol succinate XL (TOPROL-XL) 100 mg, oral, Daily    naproxen (NAPROSYN) 500 mg, oral, 2 times daily with meals    pantoprazole (PROTONIX) 40 mg, oral, Daily    PARoxetine CR (PAXIL CR) 12.5 mg, oral, Every morning    ramipriL (ALTACE) 20 mg, oral, Daily    rosuvastatin (CRESTOR) 40 mg, oral, Daily (8a)         Allergies     No known allergies     Vital Signs/Exam     Vitals:    11/27/23 1411   BP: (!) 192/92   BP Location: Left upper arm   Patient Position: Sitting   Pulse: 69   SpO2: 95%   Weight: 81.1 kg (178 lb 12.8 oz)   Height: 1.626 m (5' 4\")     BP Readings from Last 3 Encounters:   11/27/23 (!) 192/92   07/14/23 122/80   05/17/22 128/78     Wt Readings from Last 3 Encounters:   11/27/23 81.1 kg (178 lb 12.8 oz)   07/14/23 79.4 kg (175 lb)   05/17/22 77.3 kg (170 lb 6.4 oz)        Gen: no distress  HEENT: no jvd  Lungs: clear bilaterally; no crackles, rhonchi, wheezing  Chest wall: no tenderness  Heart: regular rate and " rhythm; 1/6 EMMANUEL at RUSB  Abdomen: nondistended, nontender  Extremities: no edema  Neuro: nonfocal, alert and oriented  Psych: normal mood and affect     Diagnostic Data   Diagnostic data personally reviewed. Available medical records were reviewed and updated where appropriate including laboratory data, imaging studies, and previous outpatient and inpatient records.  Counseling/education performed.     Labs:  Lab Results   Component Value Date    WBC 8.3 05/06/2021    HGB 14.2 05/06/2021     05/06/2021    ALT 19 05/06/2021    AST 19 05/06/2021     05/06/2021    K 5.2 05/06/2021     05/06/2021    CREATININE 1.08 (H) 05/06/2021    BUN 27 05/06/2021    CO2 23 05/06/2021    TSH 0.76 09/29/2020    HGBA1C 6.1 (H) 09/29/2020     Lab Results   Component Value Date    CHOL 172 09/29/2020    CHOL 154 05/20/2019    LDLCALC 85 09/29/2020    LDLCALC 84 05/20/2019    HDL 57 09/29/2020    HDL 55 05/20/2019    TRIG 148 09/29/2020    TRIG 74 05/20/2019       Echocardiogram:      Stress Tests:          CT Calcium Score:  4/5/2022: CAC 0      Cardiac cath:      Vascular Studies:  Carotid ultrasound 6/20/2022: No evidence of significant carotid atherosclerosis bilaterally.    Carotid ultrasound 6/4/2021:  Right: Grayscale evaluation demonstrates mild calcified atherosclerotic disease within the right carotid bifurcation.  Peak systolic velocities is elevated to 131 cm/s in the proximal internal carotid artery. Based upon peak flow velocity and ratio criteria, internal carotid stenosis is estimated at 50-69 % on the right side.  Left: Grayscale evaluation demonstrates minimal atherosclerotic disease within the left carotid bifurcation.   Peak systolic velocities and spectral waveforms are within normal limits. Based upon peak flow velocity and ratio criteria, internal carotid stenosis is estimated at 0-49% on the left side.    Abdominal aortic ultrasound 5/26/2021: No evidence of abdominal aortic aneurysm    Carotid  ultrasound 6/10/2019: No evidence of significant carotid atherosclerosis bilaterally.    Carotid ultrasound 11/7/2017: No hemodynamically significant narrowing of the carotid arteries.    Carotid ultrasound 6/2/2016: No evidence for hemodynamically significant stenosis within the  bilateral carotid arteries.  There is been no significant interval change when compared to the prior study from 2015. Bilateral antegrade flow within the vertebral arteries.      Peripheral:  No results found for this or any previous visit from the past 365 days.      Cardiac monitoring:      ECG independently reviewed: NSR, MIGUEL ÁNGEL, RSR'     Assessment and Plan      Forrest Mueller is a 73 y.o. female who presents today for initial office evaluation.     Diagnosis Plan   1. Dyslipidemia  ECG 12 LEAD-OFFICE PERFORMED    Transthoracic echo (TTE) complete      2. Primary hypertension  Transthoracic echo (TTE) complete      3. Right-sided carotid artery disease, unspecified type (CMS/HCC)  Ultrasound carotid bilateral      4. Carotid atherosclerosis, unspecified laterality  Ultrasound carotid bilateral      5. Murmur            Hypertension  Her blood pressure typically is apparently under good control on her current medication, which has been stable for many years.  Blood pressure in the office today is significantly elevated and was at a similar reading on recheck.  She is not having any symptoms attributable to high blood pressure currently.  -As it seems that this is quite out of character for her and she has primary care follow-up in a week I am not can to change any of her medications for now but asked her to check her blood pressure frequently over the next week and record her findings to bring into her upcoming primary care visit.  -We will check echocardiogram in the context of chronic hypertension.  -We will consider exercise testing to assess blood pressure response to exercise but will hold off on this for  now.    Hyperlipidemia  Mild carotid disease  Coronary calcium score of 0  She has longstanding hyperlipidemia on Crestor 40 mg daily.  She was previously on Zetia.  In the context of mild carotid disease goal LDL should be under 70.  Calcium score is 0.  -Continue statin at current dose.  -Blood work is pending.  If LDL is over 70 would reinitiate Zetia.  -We will recheck carotid ultrasound.  -Continue aspirin in context of mild carotid disease despite calcium score of 0.    Murmur  She has a soft systolic ejection murmur that is likely benign.  We will be getting an echocardiogram anyway in the context of hypertensive disease.      She is leaving for [place] for the winter in about a month.  I asked her to be sure that she checks in with me prior to that to be sure that her blood pressure is in fact under control.  If it is not then changes will need to be made.  Assuming things look good with her echo, carotid ultrasound, and blood pressure, I will plan to see her in the spring when she is back from [place].      Thank you for allowing me to participate in the care of this patient.     Available medical records were reviewed and updated where appropriate including laboratory data, imaging studies, and previous outpatient and inpatient records.  Counseling/education performed.      Westley Squires MD, [facility] Office: [phone]  [facility] [place]: [facility]     This document was generated utilizing voice recognition technology, typographical errors may be present.

## 2023-11-29 LAB
25(OH)D3+25(OH)D2 SERPL-MCNC: 39.9 NG/ML (ref 30–100)
ALBUMIN SERPL-MCNC: 4.2 G/DL (ref 3.8–4.8)
ALBUMIN/CREAT UR: 6 MG/G CREAT (ref 0–29)
ALBUMIN/GLOB SERPL: 1.8 {RATIO} (ref 1.2–2.2)
ALP SERPL-CCNC: 70 IU/L (ref 44–121)
ALT SERPL-CCNC: 18 IU/L (ref 0–32)
AST SERPL-CCNC: 18 IU/L (ref 0–40)
BASOPHILS # BLD AUTO: 0.1 X10E3/UL (ref 0–0.2)
BASOPHILS NFR BLD AUTO: 1 %
BILIRUB SERPL-MCNC: 0.4 MG/DL (ref 0–1.2)
BUN SERPL-MCNC: 31 MG/DL (ref 8–27)
BUN/CREAT SERPL: 27 (ref 12–28)
CALCIUM SERPL-MCNC: 9.1 MG/DL (ref 8.7–10.3)
CHLORIDE SERPL-SCNC: 103 MMOL/L (ref 96–106)
CHOLEST SERPL-MCNC: 237 MG/DL (ref 100–199)
CO2 SERPL-SCNC: 23 MMOL/L (ref 20–29)
CREAT SERPL-MCNC: 1.15 MG/DL (ref 0.57–1)
CREAT UR-MCNC: 135.9 MG/DL
EGFRCR SERPLBLD CKD-EPI 2021: 51 ML/MIN/1.73
EOSINOPHIL # BLD AUTO: 0.2 X10E3/UL (ref 0–0.4)
EOSINOPHIL NFR BLD AUTO: 3 %
ERYTHROCYTE [DISTWIDTH] IN BLOOD BY AUTOMATED COUNT: 13.1 % (ref 11.7–15.4)
GLOBULIN SER CALC-MCNC: 2.3 G/DL (ref 1.5–4.5)
GLUCOSE SERPL-MCNC: 113 MG/DL (ref 70–99)
HCT VFR BLD AUTO: 42.7 % (ref 34–46.6)
HDLC SERPL-MCNC: 52 MG/DL
HGB BLD-MCNC: 13.8 G/DL (ref 11.1–15.9)
IMM GRANULOCYTES # BLD AUTO: 0 X10E3/UL (ref 0–0.1)
IMM GRANULOCYTES NFR BLD AUTO: 0 %
LDLC SERPL CALC-MCNC: 137 MG/DL (ref 0–99)
LYMPHOCYTES # BLD AUTO: 2.1 X10E3/UL (ref 0.7–3.1)
LYMPHOCYTES NFR BLD AUTO: 28 %
MCH RBC QN AUTO: 29.5 PG (ref 26.6–33)
MCHC RBC AUTO-ENTMCNC: 32.3 G/DL (ref 31.5–35.7)
MCV RBC AUTO: 91 FL (ref 79–97)
MICROALBUMIN UR-MCNC: 7.7 UG/ML
MONOCYTES # BLD AUTO: 0.6 X10E3/UL (ref 0.1–0.9)
MONOCYTES NFR BLD AUTO: 8 %
NEUTROPHILS # BLD AUTO: 4.3 X10E3/UL (ref 1.4–7)
NEUTROPHILS NFR BLD AUTO: 60 %
PLATELET # BLD AUTO: 256 X10E3/UL (ref 150–450)
POTASSIUM SERPL-SCNC: 4.6 MMOL/L (ref 3.5–5.2)
PROT SERPL-MCNC: 6.5 G/DL (ref 6–8.5)
RBC # BLD AUTO: 4.68 X10E6/UL (ref 3.77–5.28)
SODIUM SERPL-SCNC: 142 MMOL/L (ref 134–144)
SPECIMEN STATUS: NORMAL
TRIGL SERPL-MCNC: 268 MG/DL (ref 0–149)
TSH SERPL DL<=0.005 MIU/L-ACNC: 1.43 UIU/ML (ref 0.45–4.5)
VLDLC SERPL CALC-MCNC: 48 MG/DL (ref 5–40)
WBC # BLD AUTO: 7.3 X10E3/UL (ref 3.4–10.8)

## 2023-11-30 ENCOUNTER — HOSPITAL ENCOUNTER (OUTPATIENT)
Dept: CARDIOLOGY | Facility: CLINIC | Age: 73
Discharge: HOME | End: 2023-11-30
Attending: INTERNAL MEDICINE
Payer: COMMERCIAL

## 2023-11-30 VITALS
WEIGHT: 174.38 LBS | SYSTOLIC BLOOD PRESSURE: 151 MMHG | BODY MASS INDEX: 29.77 KG/M2 | HEIGHT: 64 IN | DIASTOLIC BLOOD PRESSURE: 79 MMHG

## 2023-11-30 VITALS
HEIGHT: 64 IN | SYSTOLIC BLOOD PRESSURE: 151 MMHG | WEIGHT: 174.4 LBS | DIASTOLIC BLOOD PRESSURE: 79 MMHG | BODY MASS INDEX: 29.78 KG/M2

## 2023-11-30 DIAGNOSIS — E78.5 DYSLIPIDEMIA: ICD-10-CM

## 2023-11-30 DIAGNOSIS — I10 PRIMARY HYPERTENSION: ICD-10-CM

## 2023-11-30 DIAGNOSIS — I77.9 RIGHT-SIDED CAROTID ARTERY DISEASE, UNSPECIFIED TYPE (CMS/HCC): ICD-10-CM

## 2023-11-30 DIAGNOSIS — I65.29 CAROTID ATHEROSCLEROSIS, UNSPECIFIED LATERALITY: ICD-10-CM

## 2023-11-30 LAB
AORTIC ROOT ANNULUS: 3 CM
ASCENDING AORTA: 4 CM
AV PEAK GRADIENT: 9 MMHG
AV PEAK VELOCITY-S: 1.5 M/S
AV VALVE AREA: 1.85 CM2
BSA FOR ECHO PROCEDURE: 1.89 M2
BSA FOR ECHO PROCEDURE: 1.89 M2
CUSP SEPARATION: 2.1 CM
E WAVE DECELERATION TIME: 271 MS
E/A RATIO: 0.7
E/E' RATIO: 7
E/LAT E' RATIO: 6.9
EDV (BP): 37.5 CM3
EF (A4C): 75.4 %
EF A2C: 74.5 %
EJECTION FRACTION: 74.4 %
EST RIGHT VENT SYSTOLIC PRESSURE BY TRICUSPID REGURGITATION JET: 17 MMHG
ESV (BP): 9.6 CM3
FRACTIONAL SHORTENING: 41.88 %
INTERVENTRICULAR SEPTUM: 1.13 CM
LA ESV (BP): 35.8 CM3
LA ESV INDEX (A2C): 21.59 CM3/M2
LA ESV INDEX (BP): 18.94 CM3/M2
LA/AORTA RATIO: 1.47
LAAS-AP2: 16.4 CM2
LAAS-AP4: 13 CM2
LAD 2D: 4.4 CM
LALD A4C: 4.83 CM
LALD A4C: 5.42 CM
LAV-S: 40.8 CM3
LEFT ATRIUM VOLUME INDEX: 14.87 CM3/M2
LEFT ATRIUM VOLUME: 28.1 CM3
LEFT CCA DIST DIAS: 23.6 CM/S
LEFT CCA DIST SYS: 74.1 CM/S
LEFT CCA MID DIAS: 17.3 CM/S
LEFT CCA MID SYS: 68.2 CM/S
LEFT CCA PROX DIAS: 25.9 CM/S
LEFT CCA PROX SYS: 91.4 CM/S
LEFT ICA DIST DIAS: 25.7 CM/S
LEFT ICA DIST SYS: 84.5 CM/S
LEFT ICA MID DIAS: 30.8 CM/S
LEFT ICA MID SYS: 83.1 CM/S
LEFT ICA PROX DIAS: 20.1 CM/S
LEFT ICA PROX SYS: 95 CM/S
LEFT ICA/CCA SYS: 1.28
LEFT INTERNAL DIMENSION IN SYSTOLE: 2.97 CM (ref 2.58–3.9)
LEFT SUBCLAVIAN SYS: 99.4 CM/S
LEFT VENTRICLE DIASTOLIC VOLUME INDEX: 26.24 CM3/M2
LEFT VENTRICLE DIASTOLIC VOLUME: 49.6 CM3
LEFT VENTRICLE SYSTOLIC VOLUME INDEX: 6.46 CM3/M2
LEFT VENTRICLE SYSTOLIC VOLUME: 12.2 CM3
LEFT VENTRICULAR INTERNAL DIMENSION IN DIASTOLE: 5.11 CM (ref 4.36–6.06)
LEFT VENTRICULAR POSTERIOR WALL IN END DIASTOLE: 0.93 CM (ref 0.57–1.07)
LEFT VERTEBRAL SYS: 38.6 CM/S
LT BULB EDV: 19.2 CM/S
LT BULB PSV: 87.9 CM/S
LT ECA PROX EDV: 10.3 CM/S
LT ECA PROX PSV: 88.8 CM/S
LT VERTEBRAL MID EDV: 11 CM/S
LT VERTEBRAL MID PSV: 38.6 CM/S
LV DIASTOLIC VOLUME: 26.5 CM3
LV ESV (APICAL 2 CHAMBER): 6.77 CM3
LVAD-AP2: 15.1 CM2
LVAD-AP4: 21.7 CM2
LVAS-AP2: 6.43 CM2
LVAS-AP4: 9.16 CM2
LVEDVI(A2C): 14.02 CM3/M2
LVEDVI(BP): 19.84 CM3/M2
LVESVI(A2C): 3.58 CM3/M2
LVESVI(BP): 5.08 CM3/M2
LVLD-AP2: 7.33 CM
LVLD-AP4: 7.92 CM
LVLS-AP2: 5.29 CM
LVLS-AP4: 6.12 CM
LVOT 2D: 2.1 CM
LVOT A: 3.46 CM2
LVOT PEAK VELOCITY: 1.02 M/S
LVOT PG: 4 MMHG
MLH CV ECHO AVA INDEX VELOCITY RATIO: 1
MV E'TISSUE VEL-LAT: 0.07 M/S
MV E'TISSUE VEL-MED: 0.07 M/S
MV PEAK A VEL: 0.67 M/S
MV PEAK E VEL: 0.46 M/S
POSTERIOR WALL: 0.93 CM
RAP: 5 MMHG
RIGHT CCA DIST DIAS: 23 CM/S
RIGHT CCA DIST SYS: 77.9 CM/S
RIGHT CCA MID DIAS: 17.3 CM/S
RIGHT CCA MID SYS: 59.3 CM/S
RIGHT CCA PROX DIAS: 17.6 CM/S
RIGHT CCA PROX SYS: 71.3 CM/S
RIGHT ECA SYS: 84.3 CM/S
RIGHT ICA DIST DIAS: 26.3 CM/S
RIGHT ICA DIST SYS: 71.3 CM/S
RIGHT ICA MID DIAS: 27.7 CM/S
RIGHT ICA MID SYS: 72.7 CM/S
RIGHT ICA PROX DIAS: 48.4 CM/S
RIGHT ICA PROX SYS: 150 CM/S
RIGHT ICA/CCA SYS: 1.93
RIGHT SUBCLAVIAN SYS: 78.5 CM/S
RIGHT VA 1 EDV: 12.3 CM/S
RIGHT VA 1 MEAN: 25.23 CM/S
RIGHT VA 1 PI: 1.54
RIGHT VA 1 PSV: 51.1 CM/S
RIGHT VA 1 RI: 0.76
RIGHT VA 1 S/D RATIO: 4.15
RT BULB EDV: 25.4 CM/S
RT BULB PSV: 74.8 CM/S
RT ECA PROC EDV: 16.9 CM/S
RT VERTEBRAL MID EDV: 12.3 CM/S
RT VERTEBRAL MID PSV: 51.1 CM/S
RVOT VMAX: 0.66 M/S
SEPTAL TISSUE DOPPLER FREE WALL LATE DIA VELOCITY (APICAL 4 CHAMBER VIEW): 0.11 M/S
TR MAX PG: 11.7 MMHG
TRICUSPID VALVE PEAK REGURGITATION VELOCITY: 1.71 M/S
Z-SCORE OF LEFT VENTRICULAR DIMENSION IN END DIASTOLE: -0.06
Z-SCORE OF LEFT VENTRICULAR DIMENSION IN END SYSTOLE: -0.52
Z-SCORE OF LEFT VENTRICULAR POSTERIOR WALL IN END DIASTOLE: 0.92

## 2023-11-30 PROCEDURE — 93306 TTE W/DOPPLER COMPLETE: CPT | Performed by: INTERNAL MEDICINE

## 2023-11-30 PROCEDURE — 93880 EXTRACRANIAL BILAT STUDY: CPT | Performed by: INTERNAL MEDICINE

## 2023-12-05 ENCOUNTER — LAB REQUISITION (OUTPATIENT)
Dept: LAB | Facility: HOSPITAL | Age: 73
End: 2023-12-05
Attending: INTERNAL MEDICINE
Payer: COMMERCIAL

## 2023-12-05 ENCOUNTER — OFFICE VISIT (OUTPATIENT)
Dept: INTERNAL MEDICINE | Facility: CLINIC | Age: 73
End: 2023-12-05
Payer: COMMERCIAL

## 2023-12-05 VITALS
BODY MASS INDEX: 29.91 KG/M2 | DIASTOLIC BLOOD PRESSURE: 80 MMHG | HEART RATE: 80 BPM | WEIGHT: 175.2 LBS | HEIGHT: 64 IN | TEMPERATURE: 98.6 F | OXYGEN SATURATION: 98 % | SYSTOLIC BLOOD PRESSURE: 122 MMHG

## 2023-12-05 DIAGNOSIS — Z12.11 ENCOUNTER FOR SCREENING FOR MALIGNANT NEOPLASM OF COLON: ICD-10-CM

## 2023-12-05 DIAGNOSIS — E78.2 MIXED HYPERLIPIDEMIA: Primary | ICD-10-CM

## 2023-12-05 DIAGNOSIS — I10 PRIMARY HYPERTENSION: ICD-10-CM

## 2023-12-05 DIAGNOSIS — M15.9 GENERALIZED OSTEOARTHRITIS: ICD-10-CM

## 2023-12-05 DIAGNOSIS — R73.9 HYPERGLYCEMIA: ICD-10-CM

## 2023-12-05 PROCEDURE — G0328 FECAL BLOOD SCRN IMMUNOASSAY: HCPCS | Performed by: INTERNAL MEDICINE

## 2023-12-05 PROCEDURE — 99214 OFFICE O/P EST MOD 30 MIN: CPT | Performed by: INTERNAL MEDICINE

## 2023-12-05 PROCEDURE — 3008F BODY MASS INDEX DOCD: CPT | Performed by: INTERNAL MEDICINE

## 2023-12-05 PROCEDURE — 3074F SYST BP LT 130 MM HG: CPT | Performed by: INTERNAL MEDICINE

## 2023-12-05 PROCEDURE — 3079F DIAST BP 80-89 MM HG: CPT | Performed by: INTERNAL MEDICINE

## 2023-12-05 RX ORDER — MELOXICAM 15 MG/1
15 TABLET ORAL DAILY
Qty: 90 TABLET | Refills: 1 | Status: SHIPPED | OUTPATIENT
Start: 2023-12-05 | End: 2023-12-05

## 2023-12-05 RX ORDER — MELOXICAM 15 MG/1
15 TABLET ORAL DAILY
Qty: 90 TABLET | Refills: 1 | Status: SHIPPED | OUTPATIENT
Start: 2023-12-05 | End: 2024-05-02

## 2023-12-05 ASSESSMENT — ENCOUNTER SYMPTOMS
GASTROINTESTINAL NEGATIVE: 1
CONSTITUTIONAL NEGATIVE: 1
EYES NEGATIVE: 1
HEMATOLOGIC/LYMPHATIC NEGATIVE: 1
RESPIRATORY NEGATIVE: 1
NEUROLOGICAL NEGATIVE: 1
PSYCHIATRIC NEGATIVE: 1
ALLERGIC/IMMUNOLOGIC NEGATIVE: 1
ENDOCRINE NEGATIVE: 1

## 2023-12-05 NOTE — PROGRESS NOTES
73 years old female with history of hypertension, hyperlipidemia, generalized osteoarthritis came for 6 months follow-up.  Patient went to see cardiologist, self-referred.  During that visit she was found to have elevated blood pressure of 190/92.    Advised to monitor daily blood pressure and disc discussed with me for further management.  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  Majority of the readings are 130/70.  Denies headache, chest pain, shortness of breath, lower extremity swelling.  Scheduled to have carotid ultrasound, ordered by the cardiologist.    Patient has history of hyperlipidemia.  She is on rosuvastatin 40 mg daily.  Compliant with diet.  Admits that she has been drinking 2 glasses of wine per day and test was done soon after Thanksgiving.  Patient saw the results and already prove her daily alcohol consumption and diet.    Patient has generalized osteoarthritis.  She has been taking naproxen 500 mg daily.  Is moderately severe.  Constant.  Aggravated by activity and change in weather.  Improve after taking medication.       Past Medical History:   Diagnosis Date   • Basal cell carcinoma    • Depression    • Diverticulitis of colon    • Endometrioma    • Hypertension    • Lipid disorder    • Osteoarthritis    • Thyroid nodule        Current Outpatient Medications   Medication Sig Dispense Refill   • aspirin 81 mg chewable tablet Take 1 tablet (81 mg total) by mouth daily. 90 tablet 1   • calcium carbonate/vitamin D3 (CALCIUM 500 WITH D ORAL)      • famotidine (PEPCID) 40 mg tablet Take 1 tablet (40 mg total) by mouth nightly. 90 tablet 3   • hydrochlorothiazide (HYDRODIURIL) 25 mg tablet Take 1 tablet (25 mg total) by mouth daily. 90 tablet 3   • hydrocortisone (ANUSOL-HC) 25 mg suppository Insert 1 suppository (25 mg total) into the rectum 2 (two) times a day as needed for hemorrhoids. 20 suppository 0   • metoprolol succinate XL (TOPROL-XL) 100 mg 24 hr tablet Take 1  "tablet (100 mg total) by mouth daily. 90 tablet 3   • naproxen (NAPROSYN) 500 mg tablet Take 1 tablet (500 mg total) by mouth 2 (two) times a day with meals. 90 tablet 1   • pantoprazole (PROTONIX) 40 mg EC tablet Take 1 tablet (40 mg total) by mouth daily. 90 tablet 1   • PARoxetine CR (PAXIL CR) 12.5 mg 24 hr tablet Take 1 tablet (12.5 mg total) by mouth every morning. 90 tablet 1   • ramipriL (ALTACE) 10 mg capsule Take 2 capsules (20 mg total) by mouth daily. 180 capsule 3   • rosuvastatin (CRESTOR) 40 mg tablet Take 1 tablet (40 mg total) by mouth once daily. 90 tablet 3   • fluticasone propionate (FLONASE) 50 mcg/actuation nasal spray        No current facility-administered medications for this visit.       Review of Systems   Constitutional: Negative.    HENT: Negative.    Eyes: Negative.    Respiratory: Negative.    Cardiovascular:        See HPI   Gastrointestinal: Negative.    Endocrine: Negative.    Genitourinary: Negative.    Musculoskeletal:        See HPI   Skin: Negative.    Allergic/Immunologic: Negative.    Neurological: Negative.    Hematological: Negative.    Psychiatric/Behavioral: Negative.         Vitals:    12/05/23 0818   BP: 122/80   BP Location: Left upper arm   Patient Position: Sitting   Pulse: 80   Temp: 37 °C (98.6 °F)   TempSrc: Oral   SpO2: 98%   Weight: 79.5 kg (175 lb 3.2 oz)   Height: 1.626 m (5' 4.02\")       Body mass index is 30.06 kg/m².    Physical Exam  Constitutional:       Appearance: Normal appearance.   HENT:      Right Ear: Tympanic membrane normal.      Left Ear: Tympanic membrane normal.   Eyes:      Conjunctiva/sclera: Conjunctivae normal.      Pupils: Pupils are equal, round, and reactive to light.   Cardiovascular:      Rate and Rhythm: Normal rate and regular rhythm.      Pulses: Normal pulses.      Heart sounds: Normal heart sounds.   Pulmonary:      Effort: Pulmonary effort is normal.      Breath sounds: Normal breath sounds.   Abdominal:      General: Abdomen is " flat. Bowel sounds are normal.      Palpations: Abdomen is soft.   Musculoskeletal:         General: Normal range of motion.      Cervical back: Normal range of motion and neck supple.   Skin:     General: Skin is warm.      Capillary Refill: Capillary refill takes 2 to 3 seconds.   Neurological:      Mental Status: She is alert and oriented to person, place, and time.   Psychiatric:         Mood and Affect: Mood normal.         Behavior: Behavior normal.           Latest Reference Range & Units 11/28/23 07:51   Sodium 134 - 144 mmol/L 142   Potassium, Bld 3.5 - 5.2 mmol/L 4.6   Chloride 96 - 106 mmol/L 103   CO2 20 - 29 mmol/L 23   BUN 8 - 27 mg/dL 31 (H)   Creatinine 0.57 - 1.00 mg/dL 1.15 (H)   Glucose 70 - 99 mg/dL 113 (H)   Calcium 8.7 - 10.3 mg/dL 9.1   AST (SGOT) 0 - 40 IU/L 18   ALT (SGPT) 0 - 32 IU/L 18   Alkaline Phosphatase 44 - 121 IU/L 70   Total Protein 6.0 - 8.5 g/dL 6.5   Albumin 3.8 - 4.8 g/dL 4.2   Bilirubin, Total 0.0 - 1.2 mg/dL 0.4   eGFR >59 mL/min/1.73 51 (L)   Bun/Creatinine Ratio 12 - 28  27   Globulin 1.5 - 4.5 g/dL 2.3   Cholesterol 100 - 199 mg/dL 237 (H)   Triglycerides 0 - 149 mg/dL 268 (H)   HDL >39 mg/dL 52   LDL Calculated 0 - 99 mg/dL 137 (H)   TSH 0.450 - 4.500 uIU/mL 1.430   Vit D, 25-Hydroxy 30.0 - 100.0 ng/mL 39.9   ALBUMIN/GLOBULIN RATIO 1.2 - 2.2  1.8   WBC 3.4 - 10.8 x10E3/uL 7.3   RBC 3.77 - 5.28 x10E6/uL 4.68   Hemoglobin 11.1 - 15.9 g/dL 13.8   Hematocrit 34.0 - 46.6 % 42.7   MCV 79 - 97 fL 91   MCH 26.6 - 33.0 pg 29.5   MCHC 31.5 - 35.7 g/dL 32.3   RDW 11.7 - 15.4 % 13.1   Platelets 150 - 450 x10E3/uL 256   Neutrophils, Absolute 1.4 - 7.0 x10E3/uL 4.3   Creatinine, Urine Not Estab. mg/dL 135.9   Microalb/Creat Ratio 0 - 29 mg/g creat 6   Microalbumin, Ur Not Estab. ug/mL 7.7   Baso (Absolute) 0.0 - 0.2 x10E3/uL 0.1   Basos Not Estab. % 1   Eos Not Estab. % 3   Eos (Absolute) 0.0 - 0.4 x10E3/uL 0.2   Immature Grans (Abs) 0.0 - 0.1 x10E3/uL 0.0   Immature Granulocytes Not  Estab. % 0   Lymphs Not Estab. % 28   Lymphs (Absolute) 0.7 - 3.1 x10E3/uL 2.1   Monocytes Not Estab. % 8   Monocytes(Absolute) 0.1 - 0.9 x10E3/uL 0.6   Neutrophils Not Estab. % 60   VLDL Cholesterol Celestino 5 - 40 mg/dL 48 (H)   Specimen Status  Comment       1. Mixed hyperlipidemia    Patient already made improvement on her diet.  Increase physical activity.  She usually spent time in [place] from January to April.    Check CMP, fasting lipid profile.    - Comprehensive metabolic panel; Future  - Lipid panel; Future    2. Hyperglycemia    Pain as above.  To check hemoglobin A1c  - Comprehensive metabolic panel; Future  - Hemoglobin A1c; Future    3. Generalized osteoarthritis    Patient is to discontinue naproxen and to take meloxicam 15 mg daily as needed.    4. Hypertension    To follow low-salt diet.  Continue current medication.  Increase activity to lose weight.  To continue daily blood pressure monitoring.  Patient will follow-up with medical provider in [place] from January to April, 2024.    Juan Martinez MD

## 2023-12-07 LAB — HEMOCCULT STL QL IA: NEGATIVE

## 2023-12-21 RX ORDER — NAPROXEN SODIUM 220 MG/1
81 TABLET, FILM COATED ORAL DAILY
Qty: 90 TABLET | Refills: 3 | Status: SHIPPED | OUTPATIENT
Start: 2023-12-21 | End: 2024-12-13

## 2024-01-31 ENCOUNTER — COMPREHENSIVE EXAM (OUTPATIENT)
Dept: URBAN - METROPOLITAN AREA CLINIC 47 | Facility: CLINIC | Age: 74
End: 2024-01-31

## 2024-01-31 DIAGNOSIS — H52.7: ICD-10-CM

## 2024-01-31 DIAGNOSIS — H25.813: ICD-10-CM

## 2024-01-31 PROCEDURE — 92015 DETERMINE REFRACTIVE STATE: CPT

## 2024-01-31 PROCEDURE — 92014 COMPRE OPH EXAM EST PT 1/>: CPT

## 2024-01-31 ASSESSMENT — KERATOMETRY
OS_K2POWER_DIOPTERS: 44.50
OD_K1POWER_DIOPTERS: 43.00
OD_AXISANGLE_DEGREES: 82
OD_K2POWER_DIOPTERS: 44.50
OD_AXISANGLE2_DEGREES: 172
OS_AXISANGLE_DEGREES: 75
OS_AXISANGLE2_DEGREES: 165
OS_K1POWER_DIOPTERS: 42.75

## 2024-01-31 ASSESSMENT — VISUAL ACUITY
OD_BAT: 20/40
OD_CC: 20/30-2
OS_SC: 20/40
OS_CC: 20/30-2
OU_CC: 20/25
OS_CC: J6
OS_BAT: 20/30
OU_SC: J2
OD_SC: 20/25-2
OS_SC: J3
OD_CC: J1
OU_SC: 20/20
OU_CC: J1
OD_SC: J8

## 2024-01-31 ASSESSMENT — TONOMETRY
OD_IOP_MMHG: 16
OS_IOP_MMHG: 14

## 2024-03-04 ASSESSMENT — KERATOMETRY
OS_AXISANGLE2_DEGREES: 165
OD_K2POWER_DIOPTERS: 44.50
OS_K1POWER_DIOPTERS: 42.75
OD_K1POWER_DIOPTERS: 43.00
OD_AXISANGLE_DEGREES: 82
OS_K2POWER_DIOPTERS: 44.50
OD_AXISANGLE2_DEGREES: 172
OS_AXISANGLE_DEGREES: 75

## 2024-03-06 ENCOUNTER — CONSULTATION/EVALUATION (OUTPATIENT)
Dept: URBAN - METROPOLITAN AREA CLINIC 43 | Facility: CLINIC | Age: 74
End: 2024-03-06

## 2024-03-06 DIAGNOSIS — H18.513: ICD-10-CM

## 2024-03-06 DIAGNOSIS — H25.813: ICD-10-CM

## 2024-03-06 PROCEDURE — 92025 CPTRIZED CORNEAL TOPOGRAPHY: CPT

## 2024-03-06 PROCEDURE — 92136 OPHTHALMIC BIOMETRY: CPT

## 2024-03-06 PROCEDURE — V2799PMN IMPRIMIS PRED-MOXI-NEPAF 5ML

## 2024-03-06 PROCEDURE — 92286 ANT SGM IMG I&R SPECLR MIC: CPT

## 2024-03-06 PROCEDURE — 99214 OFFICE O/P EST MOD 30 MIN: CPT

## 2024-03-06 PROCEDURE — 92134 CPTRZ OPH DX IMG PST SGM RTA: CPT

## 2024-03-06 RX ORDER — CYCLOSPORINE 0 MG/ML: 1 SOLUTION/ DROPS OPHTHALMIC; TOPICAL TWICE A DAY

## 2024-03-06 RX ORDER — LOTEPREDNOL ETABONATE 3.8 MG/G: 1 GEL OPHTHALMIC

## 2024-03-06 ASSESSMENT — VISUAL ACUITY
OS_BAT: 20/400
OD_CC: J1
OD_BAT: 20/400
OD_SC: J10
OS_SC: J6
OS_CC: J2
OD_CC: 20/25-2
OS_CC: 20/40+2

## 2024-03-06 ASSESSMENT — TONOMETRY
OD_IOP_MMHG: 13
OS_IOP_MMHG: 13

## 2024-03-19 ENCOUNTER — FOLLOW UP (OUTPATIENT)
Dept: URBAN - METROPOLITAN AREA CLINIC 43 | Facility: CLINIC | Age: 74
End: 2024-03-19

## 2024-03-19 DIAGNOSIS — H16.223: ICD-10-CM

## 2024-03-19 DIAGNOSIS — H25.813: ICD-10-CM

## 2024-03-19 DIAGNOSIS — H18.513: ICD-10-CM

## 2024-03-19 PROCEDURE — 92136NC IOLMASTER - NO CHARGE

## 2024-03-19 PROCEDURE — 99213 OFFICE O/P EST LOW 20 MIN: CPT

## 2024-03-19 PROCEDURE — 92025 CPTRIZED CORNEAL TOPOGRAPHY: CPT

## 2024-03-19 ASSESSMENT — KERATOMETRY
OS_AXISANGLE2_DEGREES: 165
OD_K1POWER_DIOPTERS: 43.00
OD_AXISANGLE_DEGREES: 82
OD_AXISANGLE2_DEGREES: 172
OS_K1POWER_DIOPTERS: 42.75
OS_K2POWER_DIOPTERS: 44.50
OS_AXISANGLE_DEGREES: 75
OD_K2POWER_DIOPTERS: 44.50

## 2024-03-19 ASSESSMENT — TONOMETRY
OS_IOP_MMHG: 13
OD_IOP_MMHG: 14

## 2024-03-19 ASSESSMENT — VISUAL ACUITY
OD_SC: 20/30-2
OS_SC: 20/40-2

## 2024-03-31 ASSESSMENT — KERATOMETRY
OD_AXISANGLE_DEGREES: 82
OD_AXISANGLE2_DEGREES: 172
OD_K2POWER_DIOPTERS: 44.50
OS_AXISANGLE_DEGREES: 75
OS_K2POWER_DIOPTERS: 44.50
OD_K1POWER_DIOPTERS: 43.00
OS_K1POWER_DIOPTERS: 42.75
OS_AXISANGLE2_DEGREES: 165

## 2024-04-04 ENCOUNTER — PRE-OP/H&P (OUTPATIENT)
Facility: LOCATION | Age: 74
End: 2024-04-04

## 2024-04-04 ENCOUNTER — SURGERY/PROCEDURE (OUTPATIENT)
Facility: LOCATION | Age: 74
End: 2024-04-04

## 2024-04-04 DIAGNOSIS — H16.223: ICD-10-CM

## 2024-04-04 DIAGNOSIS — H18.513: ICD-10-CM

## 2024-04-04 DIAGNOSIS — H25.813: ICD-10-CM

## 2024-04-04 PROCEDURE — 66999LNSR LENSAR LASER FOR CAT SX

## 2024-04-04 PROCEDURE — 65772LRI LRI DURING CAT SX

## 2024-04-04 PROCEDURE — 99199PAV PROF ADVANCED VISION PACKAGE

## 2024-04-04 PROCEDURE — 99211HP PRE-OP

## 2024-04-04 PROCEDURE — 66984AV REMOVE CATARACT, INSERT ADVANCED LENS

## 2024-04-05 ENCOUNTER — POST-OP (OUTPATIENT)
Dept: URBAN - METROPOLITAN AREA CLINIC 47 | Facility: CLINIC | Age: 74
End: 2024-04-05

## 2024-04-05 DIAGNOSIS — Z96.1: ICD-10-CM

## 2024-04-05 DIAGNOSIS — H25.812: ICD-10-CM

## 2024-04-05 PROCEDURE — 99024 POSTOP FOLLOW-UP VISIT: CPT

## 2024-04-05 ASSESSMENT — KERATOMETRY
OS_K1POWER_DIOPTERS: 42.75
OS_AXISANGLE_DEGREES: 75
OD_K2POWER_DIOPTERS: 44.50
OD_AXISANGLE_DEGREES: 82
OD_K1POWER_DIOPTERS: 43.00
OD_AXISANGLE2_DEGREES: 172
OS_AXISANGLE2_DEGREES: 165
OS_K2POWER_DIOPTERS: 44.50

## 2024-04-05 ASSESSMENT — TONOMETRY
OS_IOP_MMHG: 16
OD_IOP_MMHG: 18

## 2024-04-05 ASSESSMENT — VISUAL ACUITY
OD_SC: 20/100-1
OD_PH: 20/60-1
OS_CC: 20/25-2

## 2024-04-09 ENCOUNTER — POST OP/EVAL OF SECOND EYE (OUTPATIENT)
Dept: URBAN - METROPOLITAN AREA CLINIC 47 | Facility: CLINIC | Age: 74
End: 2024-04-09

## 2024-04-09 DIAGNOSIS — Z96.1: ICD-10-CM

## 2024-04-09 DIAGNOSIS — H25.812: ICD-10-CM

## 2024-04-09 PROCEDURE — 99024 POSTOP FOLLOW-UP VISIT: CPT

## 2024-04-09 PROCEDURE — 99213 OFFICE O/P EST LOW 20 MIN: CPT

## 2024-04-09 ASSESSMENT — KERATOMETRY
OS_AXISANGLE_DEGREES: 75
OS_AXISANGLE2_DEGREES: 165
OD_AXISANGLE2_DEGREES: 172
OD_K2POWER_DIOPTERS: 44.50
OD_AXISANGLE_DEGREES: 82
OS_K2POWER_DIOPTERS: 44.50
OD_K1POWER_DIOPTERS: 43.00
OS_K1POWER_DIOPTERS: 42.75

## 2024-04-09 ASSESSMENT — VISUAL ACUITY
OU_SC: 20/25-1
OS_BAT: 20/400
OD_SC: 20/30
OS_CC: 20/40+2
OS_SC: J6
OS_SC: 20/30
OS_CC: J2

## 2024-04-09 ASSESSMENT — TONOMETRY
OD_IOP_MMHG: 23
OS_IOP_MMHG: 16

## 2024-04-10 ASSESSMENT — KERATOMETRY
OS_K1POWER_DIOPTERS: 42.75
OS_K2POWER_DIOPTERS: 44.50
OS_AXISANGLE2_DEGREES: 165
OS_K2POWER_DIOPTERS: 44.50
OD_K2POWER_DIOPTERS: 44.50
OS_K1POWER_DIOPTERS: 42.75
OD_K1POWER_DIOPTERS: 43.00
OD_AXISANGLE_DEGREES: 82
OD_AXISANGLE_DEGREES: 82
OD_AXISANGLE2_DEGREES: 172
OD_AXISANGLE2_DEGREES: 172
OS_AXISANGLE_DEGREES: 75
OD_K2POWER_DIOPTERS: 44.50
OS_AXISANGLE_DEGREES: 75
OS_AXISANGLE2_DEGREES: 165
OD_K1POWER_DIOPTERS: 43.00

## 2024-04-11 ENCOUNTER — PRE-OP/H&P (OUTPATIENT)
Facility: LOCATION | Age: 74
End: 2024-04-11

## 2024-04-11 ENCOUNTER — SURGERY/PROCEDURE (OUTPATIENT)
Facility: LOCATION | Age: 74
End: 2024-04-11

## 2024-04-11 DIAGNOSIS — H25.813: ICD-10-CM

## 2024-04-11 DIAGNOSIS — H25.812: ICD-10-CM

## 2024-04-11 DIAGNOSIS — Z96.1: ICD-10-CM

## 2024-04-11 PROCEDURE — 66999LNSR LENSAR LASER FOR CAT SX

## 2024-04-11 PROCEDURE — 99199PAV PROF ADVANCED VISION PACKAGE

## 2024-04-11 PROCEDURE — 66984AV REMOVE CATARACT, INSERT ADVANCED LENS: Mod: 79,LT

## 2024-04-11 PROCEDURE — 99211HP PRE-OP

## 2024-04-12 ENCOUNTER — POST-OP (OUTPATIENT)
Dept: URBAN - METROPOLITAN AREA CLINIC 47 | Facility: CLINIC | Age: 74
End: 2024-04-12

## 2024-04-12 DIAGNOSIS — Z96.1: ICD-10-CM

## 2024-04-12 PROCEDURE — 99024 POSTOP FOLLOW-UP VISIT: CPT

## 2024-04-12 ASSESSMENT — VISUAL ACUITY
OS_SC: J6
OS_SC: 20/40
OD_SC: J12
OD_SC: 20/40

## 2024-04-12 ASSESSMENT — KERATOMETRY
OD_AXISANGLE2_DEGREES: 172
OS_AXISANGLE_DEGREES: 75
OS_K1POWER_DIOPTERS: 42.75
OS_K2POWER_DIOPTERS: 44.50
OD_K2POWER_DIOPTERS: 44.50
OS_AXISANGLE2_DEGREES: 165
OD_AXISANGLE_DEGREES: 82
OD_K1POWER_DIOPTERS: 43.00

## 2024-04-12 ASSESSMENT — TONOMETRY
OS_IOP_MMHG: 20
OD_IOP_MMHG: 16

## 2024-04-18 ASSESSMENT — KERATOMETRY
OD_AXISANGLE2_DEGREES: 172
OS_AXISANGLE_DEGREES: 75
OS_K1POWER_DIOPTERS: 42.75
OS_K2POWER_DIOPTERS: 44.50
OD_AXISANGLE_DEGREES: 82
OS_AXISANGLE2_DEGREES: 165
OD_K2POWER_DIOPTERS: 44.50
OD_K1POWER_DIOPTERS: 43.00

## 2024-04-20 LAB
ALBUMIN SERPL-MCNC: 4.3 G/DL (ref 3.8–4.8)
ALBUMIN/GLOB SERPL: 1.8 {RATIO} (ref 1.2–2.2)
ALP SERPL-CCNC: 68 IU/L (ref 44–121)
ALT SERPL-CCNC: 19 IU/L (ref 0–32)
AST SERPL-CCNC: 22 IU/L (ref 0–40)
BILIRUB SERPL-MCNC: 0.5 MG/DL (ref 0–1.2)
BUN SERPL-MCNC: 31 MG/DL (ref 8–27)
BUN/CREAT SERPL: 28 (ref 12–28)
CALCIUM SERPL-MCNC: 9.6 MG/DL (ref 8.7–10.3)
CHLORIDE SERPL-SCNC: 105 MMOL/L (ref 96–106)
CHOLEST SERPL-MCNC: 161 MG/DL (ref 100–199)
CO2 SERPL-SCNC: 24 MMOL/L (ref 20–29)
CREAT SERPL-MCNC: 1.11 MG/DL (ref 0.57–1)
EGFRCR SERPLBLD CKD-EPI 2021: 53 ML/MIN/1.73
GLOBULIN SER CALC-MCNC: 2.4 G/DL (ref 1.5–4.5)
GLUCOSE SERPL-MCNC: 105 MG/DL (ref 70–99)
HBA1C MFR BLD: 5.8 % (ref 4.8–5.6)
HDLC SERPL-MCNC: 58 MG/DL
LDLC SERPL CALC-MCNC: 80 MG/DL (ref 0–99)
POTASSIUM SERPL-SCNC: 4.5 MMOL/L (ref 3.5–5.2)
PROT SERPL-MCNC: 6.7 G/DL (ref 6–8.5)
SODIUM SERPL-SCNC: 141 MMOL/L (ref 134–144)
TRIGL SERPL-MCNC: 131 MG/DL (ref 0–149)
VLDLC SERPL CALC-MCNC: 23 MG/DL (ref 5–40)

## 2024-04-23 ENCOUNTER — POST-OP (OUTPATIENT)
Dept: URBAN - METROPOLITAN AREA CLINIC 47 | Facility: CLINIC | Age: 74
End: 2024-04-23

## 2024-04-23 DIAGNOSIS — Z96.1: ICD-10-CM

## 2024-04-23 PROCEDURE — 99024 POSTOP FOLLOW-UP VISIT: CPT

## 2024-04-23 ASSESSMENT — TONOMETRY
OD_IOP_MMHG: 20
OS_IOP_MMHG: 18

## 2024-04-23 ASSESSMENT — KERATOMETRY
OD_AXISANGLE2_DEGREES: 172
OD_K2POWER_DIOPTERS: 44.50
OS_AXISANGLE2_DEGREES: 165
OD_K1POWER_DIOPTERS: 43.00
OS_AXISANGLE_DEGREES: 75
OD_AXISANGLE_DEGREES: 82
OS_K1POWER_DIOPTERS: 42.75
OS_K2POWER_DIOPTERS: 44.50

## 2024-04-23 ASSESSMENT — VISUAL ACUITY
OS_SC: J2
OD_SC: J12
OD_SC: 20/40+2
OS_SC: 20/30+1

## 2024-04-29 ENCOUNTER — OFFICE VISIT (OUTPATIENT)
Dept: CARDIOLOGY | Facility: CLINIC | Age: 74
End: 2024-04-29
Payer: COMMERCIAL

## 2024-04-29 VITALS
BODY MASS INDEX: 29.02 KG/M2 | WEIGHT: 170 LBS | SYSTOLIC BLOOD PRESSURE: 144 MMHG | HEIGHT: 64 IN | OXYGEN SATURATION: 94 % | DIASTOLIC BLOOD PRESSURE: 78 MMHG | HEART RATE: 77 BPM

## 2024-04-29 DIAGNOSIS — I10 PRIMARY HYPERTENSION: ICD-10-CM

## 2024-04-29 DIAGNOSIS — E78.5 DYSLIPIDEMIA: Primary | ICD-10-CM

## 2024-04-29 DIAGNOSIS — I65.29 CAROTID ATHEROSCLEROSIS, UNSPECIFIED LATERALITY: ICD-10-CM

## 2024-04-29 LAB
ATRIAL RATE: 57
P AXIS: 33
PR INTERVAL: 214
QRS DURATION: 90
QT INTERVAL: 428
QTC CALCULATION(BAZETT): 416
R AXIS: -9
T WAVE AXIS: 12
VENTRICULAR RATE: 57

## 2024-04-29 PROCEDURE — 3078F DIAST BP <80 MM HG: CPT | Performed by: INTERNAL MEDICINE

## 2024-04-29 PROCEDURE — 3077F SYST BP >= 140 MM HG: CPT | Performed by: INTERNAL MEDICINE

## 2024-04-29 PROCEDURE — 3008F BODY MASS INDEX DOCD: CPT | Performed by: INTERNAL MEDICINE

## 2024-04-29 PROCEDURE — 99214 OFFICE O/P EST MOD 30 MIN: CPT | Performed by: INTERNAL MEDICINE

## 2024-04-29 PROCEDURE — 93000 ELECTROCARDIOGRAM COMPLETE: CPT | Performed by: INTERNAL MEDICINE

## 2024-04-29 RX ORDER — NAPROXEN 500 MG/1
500 TABLET ORAL DAILY
COMMUNITY
Start: 2024-02-01 | End: 2024-05-02 | Stop reason: SDUPTHER

## 2024-04-29 RX ORDER — EZETIMIBE 10 MG/1
10 TABLET ORAL NIGHTLY
COMMUNITY
End: 2024-11-04 | Stop reason: SDUPTHER

## 2024-04-29 NOTE — LETTER
April 29, 2024     Juan Martinez MD  [id number] [facility] [address]  [place] [address]    Patient: Forrest Mueller  YOB: 1950  Date of Visit: 4/29/2024      Dear Dr. Martinez:    Thank you for referring Forrest Mueller to me for evaluation. Below are my notes for this consultation.    If you have questions, please do not hesitate to call me. I look forward to following your patient along with you.         Sincerely,        Westley Squires MD        CC: No Recipients    Westley Squires MD  4/29/2024 12:42 PM  Sign when Signing Visit     [facility]  [place] Office Visit       Patient ID: Forrest Mueller 73 y.o. female 1950  PCP: Juan Martinez MD      HPI     Forrest Mueller is a 73 y.o. female presenting for cardiovascular follow-up.    She has a past medical history significant for hypertension, dyslipidemia with CAC 0 (April 2022), mild carotid disease, impaired fasting glucose, GERD, diverticulosis, thyroid nodule, depression, and family history of CVA.     I met her about 6 months ago for general cardiovascular evaluation, largely in the setting of her risk factors.  Since her last visit with me she had been in [place] and was eating a lot and had little more alcohol than usual.  She recently had some blood work done showing borderline hemoglobin A1c and lipids that are substantially improved on combination of Crestor and Zetia.    Blood pressure has remained labile in general but overall probably under better control.  She is not checking it very regularly at home.    She feels quite well symptomatically and denies chest pain, shortness of breath, palpitations, syncope, or lower extremity edema.  She had some cramping in her legs at night but no claudication symptoms.    She is walking regularly without symptoms.         Past History      Past Medical History:   Diagnosis Date   • Basal cell carcinoma    • Depression    • Diverticulitis of colon    •  "Endometrioma    • Hypertension    • Lipid disorder    • Osteoarthritis    • Thyroid nodule        Past Surgical History:   Procedure Laterality Date   • DILATION AND CURETTAGE OF UTERUS     • EYE SURGERY     • HYSTERECTOMY     • LAPAROTOMY OOPHERECTOMY     • WRIST SURGERY Right        Social History     Social History Narrative   • Not on file       Family History   Problem Relation Age of Onset   • Breast cancer Biological Mother 80   • Hypertension Biological Mother    • Stroke Biological Mother    • Colon cancer Biological Father    • Hypertension Biological Father    • Stroke Father's Sister    • Colon cancer Father's Brother    • Stroke Paternal Grandmother    • Colon cancer Cousin         Medications     Current Outpatient Medications   Medication Instructions   • aspirin 81 mg, oral, Daily   • calcium carbonate/vitamin D3 (CALCIUM 500 WITH D ORAL) No dose, route, or frequency recorded.   • ezetimibe (ZETIA) 10 mg, oral, Nightly   • famotidine (PEPCID) 40 mg, oral, Nightly   • fluticasone propionate (FLONASE) 50 mcg/actuation nasal spray Daily PRN   • hydrochlorothiazide (HYDRODIURIL) 25 mg, oral, Daily   • hydrocortisone (ANUSOL-HC) 25 mg, rectal, 2 times daily PRN   • meloxicam (MOBIC) 15 mg, oral, Daily   • metoprolol succinate XL (TOPROL-XL) 100 mg, oral, Daily   • naproxen (NAPROSYN) 500 mg tablet    • pantoprazole (PROTONIX) 40 mg, oral, Daily   • PARoxetine CR (PAXIL CR) 12.5 mg, oral, Every morning   • ramipriL (ALTACE) 20 mg, oral, Daily   • rosuvastatin (CRESTOR) 40 mg, oral, Daily (8a)         Allergies     No known allergies     Vital Signs/Exam     Vitals:    04/29/24 1130   BP: (!) 144/78   Pulse: 77   SpO2: 94%   Weight: 77.1 kg (170 lb)   Height: 1.626 m (5' 4\")     BP Readings from Last 3 Encounters:   04/29/24 (!) 144/78   12/05/23 122/80   11/30/23 (!) 151/79     Wt Readings from Last 3 Encounters:   04/29/24 77.1 kg (170 lb)   12/05/23 79.5 kg (175 lb 3.2 oz)   11/30/23 79.1 kg (174 lb 6.1 " oz)        Gen: no distress  HEENT: no jvd  Lungs: clear bilaterally; no crackles, rhonchi, wheezing  Chest wall: no tenderness  Heart: regular rate and rhythm; 1/6 EMMANUEL at RUSB  Abdomen: nondistended, nontender  Extremities: no edema  Neuro: nonfocal, alert and oriented  Psych: normal mood and affect     Diagnostic Data   Diagnostic data personally reviewed. Available medical records were reviewed and updated where appropriate including laboratory data, imaging studies, and previous outpatient and inpatient records.  Counseling/education performed.     Labs:  Lab Results   Component Value Date    WBC 7.3 11/28/2023    HGB 13.8 11/28/2023     11/28/2023    ALT 19 04/19/2024    AST 22 04/19/2024     04/19/2024    K 4.5 04/19/2024     04/19/2024    CREATININE 1.11 (H) 04/19/2024    BUN 31 (H) 04/19/2024    CO2 24 04/19/2024    TSH 1.430 11/28/2023    HGBA1C 5.8 (H) 04/19/2024     Lab Results   Component Value Date    CHOL 161 04/19/2024    CHOL 237 (H) 11/28/2023    LDLCALC 80 04/19/2024    LDLCALC 137 (H) 11/28/2023    HDL 58 04/19/2024    HDL 52 11/28/2023    TRIG 131 04/19/2024    TRIG 268 (H) 11/28/2023       Echocardiogram:  11/30/23:    Left Ventricle: Normal ventricle size. Normal wall thickness. Preserved systolic function. Estimated EF 70%. No regional wall motion abnormalities. Normal diastolic filling pattern for age.  •  Right Ventricle: Normal ventricle size. Normal systolic function.  •  Aortic Valve: Tricuspid valve. No regurgitation. No stenosis.  •  Mitral Valve: Normal leaflet structure. Normal leaflet motion. No regurgitation. No stenosis.  •  Tricuspid Valve: Normal structure. Trace regurgitation. Estimated RVSP = 17 mmHg.  •  Aorta: Mild dilatation of the ascending aorta at 4.0 cm.  •  Prior Study: No prior study available for comparison.    Stress Tests:          CT Calcium Score:  4/5/2022: CAC 0      Cardiac cath:      Vascular Studies:  11/30/23:  •  There is mild  heterogeneous noncalcified plaque in the right carotid bulb and proximal portion of the right internal carotid artery without stenosis.  •  There are luminal irregularities of the left carotid bulb and proximal portion of the left internal carotid artery without stenosis  •  Flow is antegrade in the right and left vertebral arteries    Carotid ultrasound 6/20/2022: No evidence of significant carotid atherosclerosis bilaterally.    Carotid ultrasound 6/4/2021:  Right: Grayscale evaluation demonstrates mild calcified atherosclerotic disease within the right carotid bifurcation.  Peak systolic velocities is elevated to 131 cm/s in the proximal internal carotid artery. Based upon peak flow velocity and ratio criteria, internal carotid stenosis is estimated at 50-69 % on the right side.  Left: Grayscale evaluation demonstrates minimal atherosclerotic disease within the left carotid bifurcation.   Peak systolic velocities and spectral waveforms are within normal limits. Based upon peak flow velocity and ratio criteria, internal carotid stenosis is estimated at 0-49% on the left side.    Abdominal aortic ultrasound 5/26/2021: No evidence of abdominal aortic aneurysm    Carotid ultrasound 6/10/2019: No evidence of significant carotid atherosclerosis bilaterally.    Carotid ultrasound 11/7/2017: No hemodynamically significant narrowing of the carotid arteries.    Carotid ultrasound 6/2/2016: No evidence for hemodynamically significant stenosis within the  bilateral carotid arteries.  There is been no significant interval change when compared to the prior study from 2015. Bilateral antegrade flow within the vertebral arteries.      Peripheral:  No results found for this or any previous visit from the past 365 days.      Cardiac monitoring:      ECG independently reviewed: NSR, MIGUEL ÁNGEL, RSR'     Assessment and Plan      Forrest Mueller is a 73 y.o. female who presents today for initial office evaluation.     Diagnosis  Plan   1. Dyslipidemia  [facility] MUSE ECG 12 lead (clinic performed)    Lipid panel      2. Carotid atherosclerosis, unspecified laterality        3. Primary hypertension            Hypertension  Her blood pressure typically is overall under fair control and somewhat labile.  For now recommend continuing her medication at current doses with ongoing blood pressure monitoring at home.  Echocardiogram did not show evidence of LVH.    Hyperlipidemia  Mild carotid disease  Coronary calcium score of 0  She has longstanding hyperlipidemia on Crestor 40 mg daily and now recently restarted on Zetia as well with good overall reduction in LDL, not quite to 70 but markedly improved.  -In general would target LDL of under 70 but given that she is already on maximal statin therapy as well as Zetia she does not have enough ASCVD that I would strongly push for initiation of PCSK9 inhibitor.  She is working on some dietary options that she hopes will be helpful.    Murmur  She has a soft systolic ejection murmur with no significant valvular disease.      I will be happy to see her again in about 6 months.    Thank you for allowing me to participate in the care of this patient.     Available medical records were reviewed and updated where appropriate including laboratory data, imaging studies, and previous outpatient and inpatient records.  Counseling/education performed.      Westley Squires MD, [facility] Office: [phone]  Primary [facility]: [facility]     This document was generated utilizing voice recognition technology, typographical errors may be present.

## 2024-04-29 NOTE — PROGRESS NOTES
[facility]  [place] Office Visit       Patient ID: Forrest Mueller 73 y.o. female 1950  PCP: Juan Martinez MD      HPI     Forrest Mueller is a 73 y.o. female presenting for cardiovascular follow-up.    She has a past medical history significant for hypertension, dyslipidemia with CAC 0 (April 2022), mild carotid disease, impaired fasting glucose, GERD, diverticulosis, thyroid nodule, depression, and family history of CVA.     I met her about 6 months ago for general cardiovascular evaluation, largely in the setting of her risk factors.  Since her last visit with me she had been in [place] and was eating a lot and had little more alcohol than usual.  She recently had some blood work done showing borderline hemoglobin A1c and lipids that are substantially improved on combination of Crestor and Zetia.    Blood pressure has remained labile in general but overall probably under better control.  She is not checking it very regularly at home.    She feels quite well symptomatically and denies chest pain, shortness of breath, palpitations, syncope, or lower extremity edema.  She had some cramping in her legs at night but no claudication symptoms.    She is walking regularly without symptoms.         Past History      Past Medical History:   Diagnosis Date    Basal cell carcinoma     Depression     Diverticulitis of colon     Endometrioma     Hypertension     Lipid disorder     Osteoarthritis     Thyroid nodule        Past Surgical History:   Procedure Laterality Date    DILATION AND CURETTAGE OF UTERUS      EYE SURGERY      HYSTERECTOMY      LAPAROTOMY OOPHERECTOMY      WRIST SURGERY Right        Social History     Social History Narrative    Not on file       Family History   Problem Relation Age of Onset    Breast cancer Biological Mother 80    Hypertension Biological Mother     Stroke Biological Mother     Colon cancer Biological Father     Hypertension Biological Father     Stroke  "Father's Sister     Colon cancer Father's Brother     Stroke Paternal Grandmother     Colon cancer Cousin         Medications     Current Outpatient Medications   Medication Instructions    aspirin 81 mg, oral, Daily    calcium carbonate/vitamin D3 (CALCIUM 500 WITH D ORAL) No dose, route, or frequency recorded.    ezetimibe (ZETIA) 10 mg, oral, Nightly    famotidine (PEPCID) 40 mg, oral, Nightly    fluticasone propionate (FLONASE) 50 mcg/actuation nasal spray Daily PRN    hydrochlorothiazide (HYDRODIURIL) 25 mg, oral, Daily    hydrocortisone (ANUSOL-HC) 25 mg, rectal, 2 times daily PRN    meloxicam (MOBIC) 15 mg, oral, Daily    metoprolol succinate XL (TOPROL-XL) 100 mg, oral, Daily    naproxen (NAPROSYN) 500 mg tablet     pantoprazole (PROTONIX) 40 mg, oral, Daily    PARoxetine CR (PAXIL CR) 12.5 mg, oral, Every morning    ramipriL (ALTACE) 20 mg, oral, Daily    rosuvastatin (CRESTOR) 40 mg, oral, Daily (8a)         Allergies     No known allergies     Vital Signs/Exam     Vitals:    04/29/24 1130   BP: (!) 144/78   Pulse: 77   SpO2: 94%   Weight: 77.1 kg (170 lb)   Height: 1.626 m (5' 4\")     BP Readings from Last 3 Encounters:   04/29/24 (!) 144/78   12/05/23 122/80   11/30/23 (!) 151/79     Wt Readings from Last 3 Encounters:   04/29/24 77.1 kg (170 lb)   12/05/23 79.5 kg (175 lb 3.2 oz)   11/30/23 79.1 kg (174 lb 6.1 oz)        Gen: no distress  HEENT: no jvd  Lungs: clear bilaterally; no crackles, rhonchi, wheezing  Chest wall: no tenderness  Heart: regular rate and rhythm; 1/6 EMMANUEL at RUSB  Abdomen: nondistended, nontender  Extremities: no edema  Neuro: nonfocal, alert and oriented  Psych: normal mood and affect     Diagnostic Data   Diagnostic data personally reviewed. Available medical records were reviewed and updated where appropriate including laboratory data, imaging studies, and previous outpatient and inpatient records.  Counseling/education performed.     Labs:  Lab Results   Component Value Date    " WBC 7.3 11/28/2023    HGB 13.8 11/28/2023     11/28/2023    ALT 19 04/19/2024    AST 22 04/19/2024     04/19/2024    K 4.5 04/19/2024     04/19/2024    CREATININE 1.11 (H) 04/19/2024    BUN 31 (H) 04/19/2024    CO2 24 04/19/2024    TSH 1.430 11/28/2023    HGBA1C 5.8 (H) 04/19/2024     Lab Results   Component Value Date    CHOL 161 04/19/2024    CHOL 237 (H) 11/28/2023    LDLCALC 80 04/19/2024    LDLCALC 137 (H) 11/28/2023    HDL 58 04/19/2024    HDL 52 11/28/2023    TRIG 131 04/19/2024    TRIG 268 (H) 11/28/2023       Echocardiogram:  11/30/23:    Left Ventricle: Normal ventricle size. Normal wall thickness. Preserved systolic function. Estimated EF 70%. No regional wall motion abnormalities. Normal diastolic filling pattern for age.    Right Ventricle: Normal ventricle size. Normal systolic function.    Aortic Valve: Tricuspid valve. No regurgitation. No stenosis.    Mitral Valve: Normal leaflet structure. Normal leaflet motion. No regurgitation. No stenosis.    Tricuspid Valve: Normal structure. Trace regurgitation. Estimated RVSP = 17 mmHg.    Aorta: Mild dilatation of the ascending aorta at 4.0 cm.    Prior Study: No prior study available for comparison.    Stress Tests:          CT Calcium Score:  4/5/2022: CAC 0      Cardiac cath:      Vascular Studies:  11/30/23:    There is mild heterogeneous noncalcified plaque in the right carotid bulb and proximal portion of the right internal carotid artery without stenosis.    There are luminal irregularities of the left carotid bulb and proximal portion of the left internal carotid artery without stenosis    Flow is antegrade in the right and left vertebral arteries    Carotid ultrasound 6/20/2022: No evidence of significant carotid atherosclerosis bilaterally.    Carotid ultrasound 6/4/2021:  Right: Grayscale evaluation demonstrates mild calcified atherosclerotic disease within the right carotid bifurcation.  Peak systolic velocities is elevated to  131 cm/s in the proximal internal carotid artery. Based upon peak flow velocity and ratio criteria, internal carotid stenosis is estimated at 50-69 % on the right side.  Left: Grayscale evaluation demonstrates minimal atherosclerotic disease within the left carotid bifurcation.   Peak systolic velocities and spectral waveforms are within normal limits. Based upon peak flow velocity and ratio criteria, internal carotid stenosis is estimated at 0-49% on the left side.    Abdominal aortic ultrasound 5/26/2021: No evidence of abdominal aortic aneurysm    Carotid ultrasound 6/10/2019: No evidence of significant carotid atherosclerosis bilaterally.    Carotid ultrasound 11/7/2017: No hemodynamically significant narrowing of the carotid arteries.    Carotid ultrasound 6/2/2016: No evidence for hemodynamically significant stenosis within the  bilateral carotid arteries.  There is been no significant interval change when compared to the prior study from 2015. Bilateral antegrade flow within the vertebral arteries.      Peripheral:  No results found for this or any previous visit from the past 365 days.      Cardiac monitoring:      ECG independently reviewed: NSR, MIGUEL ÁNGEL, RSR'     Assessment and Plan      Forrest Mueller is a 73 y.o. female who presents today for initial office evaluation.     Diagnosis Plan   1. Dyslipidemia  [facility] MUSE ECG 12 lead (clinic performed)    Lipid panel      2. Carotid atherosclerosis, unspecified laterality        3. Primary hypertension            Hypertension  Her blood pressure typically is overall under fair control and somewhat labile.  For now recommend continuing her medication at current doses with ongoing blood pressure monitoring at home.  Echocardiogram did not show evidence of LVH.    Hyperlipidemia  Mild carotid disease  Coronary calcium score of 0  She has longstanding hyperlipidemia on Crestor 40 mg daily and now recently restarted on Zetia as well with good overall  reduction in LDL, not quite to 70 but markedly improved.  -In general would target LDL of under 70 but given that she is already on maximal statin therapy as well as Zetia she does not have enough ASCVD that I would strongly push for initiation of PCSK9 inhibitor.  She is working on some dietary options that she hopes will be helpful.    Murmur  She has a soft systolic ejection murmur with no significant valvular disease.      I will be happy to see her again in about 6 months.    Thank you for allowing me to participate in the care of this patient.     Available medical records were reviewed and updated where appropriate including laboratory data, imaging studies, and previous outpatient and inpatient records.  Counseling/education performed.      Westley Squires MD, [facility] Office: [phone]  [facility] [place]: [facility]     This document was generated utilizing voice recognition technology, typographical errors may be present.

## 2024-05-02 ENCOUNTER — OFFICE VISIT (OUTPATIENT)
Dept: INTERNAL MEDICINE | Facility: CLINIC | Age: 74
End: 2024-05-02
Payer: COMMERCIAL

## 2024-05-02 VITALS
HEIGHT: 64 IN | DIASTOLIC BLOOD PRESSURE: 80 MMHG | SYSTOLIC BLOOD PRESSURE: 122 MMHG | WEIGHT: 169.4 LBS | HEART RATE: 86 BPM | OXYGEN SATURATION: 98 % | TEMPERATURE: 98.6 F | BODY MASS INDEX: 28.92 KG/M2

## 2024-05-02 DIAGNOSIS — I77.9 RIGHT-SIDED CAROTID ARTERY DISEASE, UNSPECIFIED TYPE (CMS/HCC): ICD-10-CM

## 2024-05-02 DIAGNOSIS — F34.1 PERSISTENT DEPRESSIVE DISORDER: ICD-10-CM

## 2024-05-02 DIAGNOSIS — Z78.0 MENOPAUSE: ICD-10-CM

## 2024-05-02 DIAGNOSIS — R73.9 HYPERGLYCEMIA: Primary | ICD-10-CM

## 2024-05-02 DIAGNOSIS — I10 ESSENTIAL HYPERTENSION: ICD-10-CM

## 2024-05-02 DIAGNOSIS — E78.2 MIXED HYPERLIPIDEMIA: ICD-10-CM

## 2024-05-02 DIAGNOSIS — K21.9 GASTROESOPHAGEAL REFLUX DISEASE WITHOUT ESOPHAGITIS: ICD-10-CM

## 2024-05-02 PROCEDURE — 99214 OFFICE O/P EST MOD 30 MIN: CPT | Performed by: INTERNAL MEDICINE

## 2024-05-02 PROCEDURE — 3074F SYST BP LT 130 MM HG: CPT | Performed by: INTERNAL MEDICINE

## 2024-05-02 PROCEDURE — 3079F DIAST BP 80-89 MM HG: CPT | Performed by: INTERNAL MEDICINE

## 2024-05-02 PROCEDURE — 3008F BODY MASS INDEX DOCD: CPT | Performed by: INTERNAL MEDICINE

## 2024-05-02 RX ORDER — HYDROCHLOROTHIAZIDE 25 MG/1
25 TABLET ORAL DAILY
Qty: 90 TABLET | Refills: 3 | Status: SHIPPED | OUTPATIENT
Start: 2024-05-02 | End: 2024-11-04 | Stop reason: SDUPTHER

## 2024-05-02 RX ORDER — PANTOPRAZOLE SODIUM 40 MG/1
40 TABLET, DELAYED RELEASE ORAL DAILY
Qty: 90 TABLET | Refills: 1 | Status: SHIPPED | OUTPATIENT
Start: 2024-05-02 | End: 2024-11-04 | Stop reason: SDUPTHER

## 2024-05-02 RX ORDER — RAMIPRIL 10 MG/1
20 CAPSULE ORAL DAILY
Qty: 180 CAPSULE | Refills: 3 | Status: CANCELLED | OUTPATIENT
Start: 2024-05-02

## 2024-05-02 RX ORDER — FAMOTIDINE 40 MG/1
40 TABLET, FILM COATED ORAL NIGHTLY
Qty: 90 TABLET | Refills: 3 | Status: SHIPPED | OUTPATIENT
Start: 2024-05-02 | End: 2024-11-04 | Stop reason: SDUPTHER

## 2024-05-02 RX ORDER — RAMIPRIL 10 MG/1
20 CAPSULE ORAL DAILY
Qty: 180 CAPSULE | Refills: 3 | Status: SHIPPED | OUTPATIENT
Start: 2024-05-02 | End: 2024-11-04 | Stop reason: SDUPTHER

## 2024-05-02 RX ORDER — PAROXETINE HYDROCHLORIDE HEMIHYDRATE 12.5 MG/1
12.5 TABLET, FILM COATED, EXTENDED RELEASE ORAL EVERY MORNING
Qty: 14 TABLET | Refills: 0 | Status: SHIPPED | OUTPATIENT
Start: 2024-05-02 | End: 2024-08-29

## 2024-05-02 RX ORDER — NAPROXEN 500 MG/1
500 TABLET ORAL DAILY
Qty: 90 TABLET | Refills: 1 | Status: SHIPPED | OUTPATIENT
Start: 2024-05-02 | End: 2024-11-04 | Stop reason: SDUPTHER

## 2024-05-02 RX ORDER — RAMIPRIL 10 MG/1
10 CAPSULE ORAL DAILY
Qty: 90 CAPSULE | Refills: 1 | Status: SHIPPED | OUTPATIENT
Start: 2024-05-02 | End: 2024-11-04

## 2024-05-02 RX ORDER — ROSUVASTATIN CALCIUM 40 MG/1
40 TABLET, COATED ORAL
Qty: 90 TABLET | Refills: 3 | Status: SHIPPED | OUTPATIENT
Start: 2024-05-02 | End: 2024-11-04 | Stop reason: SDUPTHER

## 2024-05-02 ASSESSMENT — ENCOUNTER SYMPTOMS
EYES NEGATIVE: 1
CARDIOVASCULAR NEGATIVE: 1
HEMATOLOGIC/LYMPHATIC NEGATIVE: 1
CONSTITUTIONAL NEGATIVE: 1
NEUROLOGICAL NEGATIVE: 1
ALLERGIC/IMMUNOLOGIC NEGATIVE: 1
RESPIRATORY NEGATIVE: 1
PSYCHIATRIC NEGATIVE: 1
ENDOCRINE NEGATIVE: 1
GASTROINTESTINAL NEGATIVE: 1

## 2024-05-02 NOTE — PROGRESS NOTES
Chief Complaint   Patient presents with    Follow-up     73 years old female with history of hypertension, osteoarthritis, hyperlipidemia, GERD depression and history of basal cell carcinoma.  Patient came for planned follow-up.  Patient saw cardiologist Dr. Squires.  There was no change in medications.    As for hypertension, patient needs prescription refills.  Denies headache, chest pain, shortness of breath, orthopnea, paroxysmal nocturnal dyspnea.  Patient is physically active but also enjoys eating.  Cardiologist advised the patient to lose weight which patient is working on it.    As for GERD.  She is on Pepcid 40 mg daily and pantoprazole 40 mg daily.  Question whether she needs to take both medication.  Denies any recent symptoms flareup.      As for hyperlipidemia, she is on Crestor 10 mg daily.  Dr. Squires discussed about IV infusion.       As for depression, symptoms are controlled.  Needs prescription refill.    Patient had bone density test in May 2022.  Needs prescription refill.  Denies height loss or back pain.    Patient has hyperglycemia.  Hemoglobin A1c is 5.7.  Denies excessive thirst, excessive urination, tingling or numbness in the extremities.  Regularly follows up with ophthalmologist.    Follows up with dermatologist every 6 months.         Past Medical History:   Diagnosis Date    Basal cell carcinoma     Depression     Diverticulitis of colon     Endometrioma     Hypertension     Lipid disorder     Osteoarthritis     Thyroid nodule        Current Outpatient Medications   Medication Sig Dispense Refill    aspirin 81 mg chewable tablet TAKE 1 TABLET DAILY 90 tablet 3    calcium carbonate/vitamin D3 (CALCIUM 500 WITH D ORAL)       ezetimibe (ZETIA) 10 mg tablet Take 10 mg by mouth nightly.      famotidine (PEPCID) 40 mg tablet Take 1 tablet (40 mg total) by mouth nightly. 90 tablet 3    fluticasone propionate (FLONASE) 50 mcg/actuation nasal spray daily as needed.      hydrochlorothiazide  "(HYDRODIURIL) 25 mg tablet Take 1 tablet (25 mg total) by mouth daily. 90 tablet 3    hydrocortisone (ANUSOL-HC) 25 mg suppository Insert 1 suppository (25 mg total) into the rectum 2 (two) times a day as needed for hemorrhoids. 20 suppository 0    metoprolol succinate XL (TOPROL-XL) 100 mg 24 hr tablet Take 1 tablet (100 mg total) by mouth daily. 90 tablet 3    naproxen (NAPROSYN) 500 mg tablet 500 mg daily.      pantoprazole (PROTONIX) 40 mg EC tablet Take 1 tablet (40 mg total) by mouth daily. 90 tablet 1    PARoxetine CR (PAXIL CR) 12.5 mg 24 hr tablet Take 1 tablet (12.5 mg total) by mouth every morning. 14 tablet 0    ramipriL (ALTACE) 10 mg capsule Take 2 capsules (20 mg total) by mouth daily. 180 capsule 3    rosuvastatin (CRESTOR) 40 mg tablet Take 1 tablet (40 mg total) by mouth once daily. 90 tablet 3    meloxicam (MOBIC) 15 mg tablet Take 1 tablet (15 mg total) by mouth daily. 90 tablet 1     No current facility-administered medications for this visit.       Review of Systems   Constitutional: Negative.    HENT: Negative.     Eyes: Negative.    Respiratory: Negative.     Cardiovascular: Negative.    Gastrointestinal: Negative.    Endocrine: Negative.    Genitourinary: Negative.    Musculoskeletal:         Has arthritis.  Takes naproxen 500 mg daily.  Needs refill.   Allergic/Immunologic: Negative.    Neurological: Negative.    Hematological: Negative.    Psychiatric/Behavioral: Negative.          Vitals:    05/02/24 0823   BP: 122/80   BP Location: Left upper arm   Patient Position: Sitting   Pulse: 86   Temp: 37 °C (98.6 °F)   TempSrc: Oral   SpO2: 98%   Weight: 76.8 kg (169 lb 6.4 oz)   Height: 1.626 m (5' 4.02\")       Body mass index is 29.06 kg/m².    Physical Exam  Constitutional:       Appearance: Normal appearance. She is obese.   HENT:      Right Ear: Tympanic membrane normal.      Left Ear: Tympanic membrane normal.   Eyes:      Conjunctiva/sclera: Conjunctivae normal.      Pupils: Pupils are " equal, round, and reactive to light.   Cardiovascular:      Rate and Rhythm: Normal rate and regular rhythm.      Pulses: Normal pulses.      Heart sounds: Normal heart sounds.   Pulmonary:      Effort: Pulmonary effort is normal.      Breath sounds: Normal breath sounds.   Abdominal:      General: Abdomen is flat. Bowel sounds are normal.      Palpations: Abdomen is soft.   Musculoskeletal:         General: Normal range of motion.      Cervical back: Normal range of motion and neck supple.   Skin:     General: Skin is warm.      Capillary Refill: Capillary refill takes 2 to 3 seconds.   Neurological:      Mental Status: She is alert and oriented to person, place, and time.   Psychiatric:         Mood and Affect: Mood normal.         Behavior: Behavior normal.           Latest Reference Range & Units 04/19/24 08:41 04/29/24 11:41   Sodium 134 - 144 mmol/L 141    Potassium, Bld 3.5 - 5.2 mmol/L 4.5    Chloride 96 - 106 mmol/L 105    CO2 20 - 29 mmol/L 24    BUN 8 - 27 mg/dL 31 (H)    Creatinine 0.57 - 1.00 mg/dL 1.11 (H)    Glucose 70 - 99 mg/dL 105 (H)    Calcium 8.7 - 10.3 mg/dL 9.6    AST (SGOT) 0 - 40 IU/L 22    ALT (SGPT) 0 - 32 IU/L 19    Alkaline Phosphatase 44 - 121 IU/L 68    Total Protein 6.0 - 8.5 g/dL 6.7    Albumin 3.8 - 4.8 g/dL 4.3    Bilirubin, Total 0.0 - 1.2 mg/dL 0.5    eGFR >59 mL/min/1.73 53 (L)    Bun/Creatinine Ratio 12 - 28  28    Globulin 1.5 - 4.5 g/dL 2.4    Cholesterol 100 - 199 mg/dL 161    Triglycerides 0 - 149 mg/dL 131    HDL >39 mg/dL 58    LDL Calculated 0 - 99 mg/dL 80    Hemoglobin A1C 4.8 - 5.6 % 5.8 (H)    ALBUMIN/GLOBULIN RATIO 1.2 - 2.2  1.8    VLDL Cholesterol Celestino 5 - 40 mg/dL 23    ECG 12-LEAD   Rpt   R Axis   -9       1. Hyperglycemia    In light of hyperlipidemia, hypertension and hyperglycemia, patient will benefit from Jardiance.  To start on 10 mg daily.  To increase fluid intake.  Check hemoglobin A1c and CMP in 4 months.    - empagliflozin (JARDIANCE) 10 mg tablet;  Take 1 tablet (10 mg total) by mouth daily.  Dispense: 90 tablet; Refill: 1  - Hemoglobin A1c; Future    2. Menopause    Check bone density test at the end of this month.  - DEXA BONE DENSITY; Future    3. Gastroesophageal reflux disease without esophagitis    Patient can hold either Pepcid or Protonix and see if symptoms controlled with 1 medication.  Potential long-term side effects such as B12 absorption discussed.    - famotidine (PEPCID) 40 mg tablet; Take 1 tablet (40 mg total) by mouth nightly.  Dispense: 90 tablet; Refill: 3  - pantoprazole (PROTONIX) 40 mg EC tablet; Take 1 tablet (40 mg total) by mouth daily.  Dispense: 90 tablet; Refill: 1    4. Essential hypertension    Controlled.  Continue current medications.  Prescription refill dispensed.    - hydrochlorothiazide (HYDRODIURIL) 25 mg tablet; Take 1 tablet (25 mg total) by mouth daily.  Dispense: 90 tablet; Refill: 3  - ramipriL (ALTACE) 10 mg capsule; Take 2 capsules (20 mg total) by mouth daily.  Dispense: 180 capsule; Refill: 3  - Comprehensive metabolic panel; Future    5. Persistent depressive disorder    Controlled.  Continue current medications.  - PARoxetine CR (PAXIL CR) 12.5 mg 24 hr tablet; Take 1 tablet (12.5 mg total) by mouth every morning.  Dispense: 14 tablet; Refill: 0    6. Mixed hyperlipidemia    Controlled.  Continue Crestor.  - rosuvastatin (CRESTOR) 40 mg tablet; Take 1 tablet (40 mg total) by mouth once daily.  Dispense: 90 tablet; Refill: 3    8. Right-sided carotid artery disease, unspecified type (CMS/HCC)    Stable.  To follow-up in 6 months for wellness checkup.      Juan Martinez MD

## 2024-05-06 ENCOUNTER — HOSPITAL ENCOUNTER (OUTPATIENT)
Dept: RADIOLOGY | Facility: CLINIC | Age: 74
Discharge: HOME | End: 2024-05-06
Attending: INTERNAL MEDICINE
Payer: COMMERCIAL

## 2024-05-06 ENCOUNTER — TELEPHONE (OUTPATIENT)
Dept: INTERNAL MEDICINE | Facility: CLINIC | Age: 74
End: 2024-05-06
Payer: COMMERCIAL

## 2024-05-06 DIAGNOSIS — Z78.0 MENOPAUSE: ICD-10-CM

## 2024-05-06 PROCEDURE — 77080 DXA BONE DENSITY AXIAL: CPT

## 2024-05-06 NOTE — TELEPHONE ENCOUNTER
Please inform the patient, bone density test was markable.   Detail Level: Zone Plan: Recommend minimum of SPF 30+ daily to the sun exposed areas. Reapply every 2 hours.

## 2024-06-20 ENCOUNTER — HOSPITAL ENCOUNTER (OUTPATIENT)
Dept: RADIOLOGY | Facility: CLINIC | Age: 74
Discharge: HOME | End: 2024-06-20
Attending: INTERNAL MEDICINE
Payer: COMMERCIAL

## 2024-06-20 DIAGNOSIS — Z12.31 BREAST CANCER SCREENING BY MAMMOGRAM: ICD-10-CM

## 2024-06-20 PROCEDURE — 77063 BREAST TOMOSYNTHESIS BI: CPT

## 2024-07-18 ENCOUNTER — DOCUMENTATION (OUTPATIENT)
Dept: INTERNAL MEDICINE | Facility: CLINIC | Age: 74
End: 2024-07-18
Payer: COMMERCIAL

## 2024-07-18 NOTE — PROGRESS NOTES
Lacey Greer MA has completed a chart review for Marquita Mueller      Care Gap Source: [employer]    Care Gap(s) Identified: Diabetic Nephropathy Screening    Chart Review Completed: Yes  No active dm dx in chart   Excluded from measure

## 2024-08-29 DIAGNOSIS — F34.1 PERSISTENT DEPRESSIVE DISORDER: ICD-10-CM

## 2024-08-29 RX ORDER — PAROXETINE HYDROCHLORIDE HEMIHYDRATE 12.5 MG/1
12.5 TABLET, FILM COATED, EXTENDED RELEASE ORAL EVERY MORNING
Qty: 14 TABLET | Refills: 25 | Status: SHIPPED | OUTPATIENT
Start: 2024-08-29 | End: 2024-11-04 | Stop reason: SDUPTHER

## 2024-08-29 NOTE — TELEPHONE ENCOUNTER
Medicine Refill Request    Last Office Visit: 5/2/2024   Last Consult Visit: Visit date not found  Last Telemedicine Visit: Visit date not found    Next Appointment: 11/4/2024      Current Outpatient Medications:     aspirin 81 mg chewable tablet, TAKE 1 TABLET DAILY, Disp: 90 tablet, Rfl: 3    calcium carbonate/vitamin D3 (CALCIUM 500 WITH D ORAL), , Disp: , Rfl:     empagliflozin (JARDIANCE) 10 mg tablet, Take 1 tablet (10 mg total) by mouth daily., Disp: 90 tablet, Rfl: 1    ezetimibe (ZETIA) 10 mg tablet, Take 10 mg by mouth nightly., Disp: , Rfl:     famotidine (PEPCID) 40 mg tablet, Take 1 tablet (40 mg total) by mouth nightly., Disp: 90 tablet, Rfl: 3    fluticasone propionate (FLONASE) 50 mcg/actuation nasal spray, daily as needed., Disp: , Rfl:     hydrochlorothiazide (HYDRODIURIL) 25 mg tablet, Take 1 tablet (25 mg total) by mouth daily., Disp: 90 tablet, Rfl: 3    hydrocortisone (ANUSOL-HC) 25 mg suppository, Insert 1 suppository (25 mg total) into the rectum 2 (two) times a day as needed for hemorrhoids., Disp: 20 suppository, Rfl: 0    metoprolol succinate XL (TOPROL-XL) 100 mg 24 hr tablet, Take 1 tablet (100 mg total) by mouth daily., Disp: 90 tablet, Rfl: 3    naproxen (NAPROSYN) 500 mg tablet, Take 1 tablet (500 mg total) by mouth daily., Disp: 90 tablet, Rfl: 1    pantoprazole (PROTONIX) 40 mg EC tablet, Take 1 tablet (40 mg total) by mouth daily., Disp: 90 tablet, Rfl: 1    PARoxetine CR (PAXIL CR) 12.5 mg 24 hr tablet, Take 1 tablet (12.5 mg total) by mouth every morning., Disp: 14 tablet, Rfl: 0    ramipriL (ALTACE) 10 mg capsule, Take 1 capsule (10 mg total) by mouth daily., Disp: 90 capsule, Rfl: 1    ramipriL (ALTACE) 10 mg capsule, Take 2 capsules (20 mg total) by mouth daily., Disp: 180 capsule, Rfl: 3    rosuvastatin (CRESTOR) 40 mg tablet, Take 1 tablet (40 mg total) by mouth once daily., Disp: 90 tablet, Rfl: 3      BP Readings from Last 3 Encounters:   05/02/24 122/80   04/29/24 (!)  144/78   12/05/23 122/80       Recent Lab results:  Lab Results   Component Value Date    CHOL 161 04/19/2024   ,   Lab Results   Component Value Date    HDL 58 04/19/2024   ,   Lab Results   Component Value Date    LDLCALC 80 04/19/2024   ,   Lab Results   Component Value Date    TRIG 131 04/19/2024        Lab Results   Component Value Date    GLUCOSE 105 (H) 04/19/2024   ,   Lab Results   Component Value Date    HGBA1C 5.8 (H) 04/19/2024         Lab Results   Component Value Date    CREATININE 1.11 (H) 04/19/2024       Lab Results   Component Value Date    TSH 1.430 11/28/2023           Lab Results   Component Value Date    HGBA1C 5.8 (H) 04/19/2024

## 2024-10-10 ENCOUNTER — APPOINTMENT (OUTPATIENT)
Dept: LAB | Facility: CLINIC | Age: 74
End: 2024-10-10
Attending: INTERNAL MEDICINE
Payer: COMMERCIAL

## 2024-10-10 DIAGNOSIS — E78.5 DYSLIPIDEMIA: ICD-10-CM

## 2024-10-10 DIAGNOSIS — I10 ESSENTIAL HYPERTENSION: ICD-10-CM

## 2024-10-10 DIAGNOSIS — R73.9 HYPERGLYCEMIA: ICD-10-CM

## 2024-10-10 LAB
ALBUMIN SERPL-MCNC: 4.1 G/DL (ref 3.5–5.7)
ALP SERPL-CCNC: 51 IU/L (ref 34–125)
ALT SERPL-CCNC: 24 IU/L (ref 7–52)
ANION GAP SERPL CALC-SCNC: 8 MEQ/L (ref 3–15)
AST SERPL-CCNC: 20 IU/L (ref 13–39)
BILIRUB SERPL-MCNC: 0.5 MG/DL (ref 0.3–1.2)
BUN SERPL-MCNC: 37 MG/DL (ref 7–25)
CALCIUM SERPL-MCNC: 9.5 MG/DL (ref 8.6–10.3)
CHLORIDE SERPL-SCNC: 106 MEQ/L (ref 98–107)
CHOLEST SERPL-MCNC: 166 MG/DL
CO2 SERPL-SCNC: 27 MEQ/L (ref 21–31)
CREAT SERPL-MCNC: 1.1 MG/DL (ref 0.6–1.2)
EGFRCR SERPLBLD CKD-EPI 2021: 53.2 ML/MIN/1.73M*2
EST. AVERAGE GLUCOSE BLD GHB EST-MCNC: 120 MG/DL
GLUCOSE SERPL-MCNC: 100 MG/DL (ref 70–99)
HBA1C MFR BLD: 5.8 %
HDLC SERPL-MCNC: 56 MG/DL
HDLC SERPL: 3 {RATIO}
LDLC SERPL CALC-MCNC: 76 MG/DL
NONHDLC SERPL-MCNC: 110 MG/DL
POTASSIUM SERPL-SCNC: 4.8 MEQ/L (ref 3.5–5.1)
PROT SERPL-MCNC: 6.6 G/DL (ref 6–8.2)
SODIUM SERPL-SCNC: 141 MEQ/L (ref 136–145)
TRIGL SERPL-MCNC: 170 MG/DL

## 2024-10-10 PROCEDURE — 36415 COLL VENOUS BLD VENIPUNCTURE: CPT

## 2024-10-10 PROCEDURE — 83036 HEMOGLOBIN GLYCOSYLATED A1C: CPT

## 2024-10-10 PROCEDURE — 80053 COMPREHEN METABOLIC PANEL: CPT

## 2024-10-10 PROCEDURE — 80061 LIPID PANEL: CPT

## 2024-10-14 ENCOUNTER — OFFICE VISIT (OUTPATIENT)
Dept: CARDIOLOGY | Facility: CLINIC | Age: 74
End: 2024-10-14
Payer: COMMERCIAL

## 2024-10-14 VITALS
DIASTOLIC BLOOD PRESSURE: 82 MMHG | SYSTOLIC BLOOD PRESSURE: 134 MMHG | HEART RATE: 59 BPM | WEIGHT: 169 LBS | HEIGHT: 64 IN | BODY MASS INDEX: 28.85 KG/M2 | OXYGEN SATURATION: 98 %

## 2024-10-14 DIAGNOSIS — E78.5 DYSLIPIDEMIA: Primary | ICD-10-CM

## 2024-10-14 DIAGNOSIS — I65.29 CAROTID ATHEROSCLEROSIS, UNSPECIFIED LATERALITY: ICD-10-CM

## 2024-10-14 DIAGNOSIS — I10 PRIMARY HYPERTENSION: ICD-10-CM

## 2024-10-14 DIAGNOSIS — R01.1 MURMUR: ICD-10-CM

## 2024-10-14 LAB
ATRIAL RATE: 61
P AXIS: 37
PR INTERVAL: 230
QRS DURATION: 90
QT INTERVAL: 420
QTC CALCULATION(BAZETT): 422
R AXIS: -11
T WAVE AXIS: 11
VENTRICULAR RATE: 61

## 2024-10-14 PROCEDURE — 99214 OFFICE O/P EST MOD 30 MIN: CPT | Performed by: INTERNAL MEDICINE

## 2024-10-14 PROCEDURE — 3079F DIAST BP 80-89 MM HG: CPT | Performed by: INTERNAL MEDICINE

## 2024-10-14 PROCEDURE — 3008F BODY MASS INDEX DOCD: CPT | Performed by: INTERNAL MEDICINE

## 2024-10-14 PROCEDURE — 3075F SYST BP GE 130 - 139MM HG: CPT | Performed by: INTERNAL MEDICINE

## 2024-10-14 PROCEDURE — 93000 ELECTROCARDIOGRAM COMPLETE: CPT | Performed by: INTERNAL MEDICINE

## 2024-10-14 NOTE — PROGRESS NOTES
[facility] Cardiology  [place] Office Visit       Patient ID: Forrest Mueller 74 y.o. female 1950  PCP: Juan Martinez MD      [facility]     Forrest Mueller is a 74 y.o. female presenting for cardiovascular follow-up.    She has a past medical history significant for hypertension, dyslipidemia with CAC 0 (April 2022), mild carotid disease, impaired fasting glucose, GERD, diverticulosis, thyroid nodule, depression, and family history of CVA.     She presents today for routine follow-up.  Since her last visit with me she is generally doing well and is not having any new symptoms or new concerns.  She was recently on vacation with her  in [place] and was quite active and walking a lot without any significant symptoms.  She does also note that she was eating a lot of foods that she knows were not great for her while she was there and just had some blood work done.    She has been checking her blood pressure at home and it has been under good control.  She notes that her sugars are in the borderline diabetic range.      She feels quite well symptomatically and denies chest pain, shortness of breath, palpitations, syncope, or lower extremity edema or claudication.  She had some cramping in her legs at night but no claudication symptoms.    She is walking regularly without symptoms.             Past History      Past Medical History:   Diagnosis Date    Basal cell carcinoma     Depression     Diverticulitis of colon     Endometrioma     Hypertension     Lipid disorder     Osteoarthritis     Thyroid nodule        Past Surgical History   Procedure Laterality Date    Dilation and curettage of uterus      Eye surgery      Hysterectomy      Laparotomy oopherectomy      Wrist surgery Right        Social History     Social History Narrative    Not on file       Family History   Problem Relation Name Age of Onset    Breast cancer Biological Mother  80    Hypertension Biological Mother      Stroke Biological  "Mother      Colon cancer Biological Father      Hypertension Biological Father      Stroke Father's Sister      Colon cancer Father's Brother      Stroke Paternal Grandmother      Colon cancer Cousin          Medications     Current Outpatient Medications   Medication Instructions    aspirin 81 mg, oral, Daily    calcium carbonate/vitamin D3 (CALCIUM 500 WITH D ORAL) No dose, route, or frequency recorded.    empagliflozin (JARDIANCE) 10 mg, oral, Daily    ezetimibe (ZETIA) 10 mg, Nightly    famotidine (PEPCID) 40 mg, oral, Nightly    fluticasone propionate (FLONASE) 50 mcg/actuation nasal spray Daily PRN    hydrochlorothiazide (HYDRODIURIL) 25 mg, oral, Daily    hydrocortisone (ANUSOL-HC) 25 mg, rectal, 2 times daily PRN    metoprolol succinate XL (TOPROL-XL) 100 mg, oral, Daily    naproxen (NAPROSYN) 500 mg, oral, Daily    pantoprazole (PROTONIX) 40 mg, oral, Daily    PARoxetine CR (PAXIL-CR) 12.5 mg, oral, Every morning    ramipriL (ALTACE) 10 mg, oral, Daily    ramipriL (ALTACE) 20 mg, oral, Daily    rosuvastatin (CRESTOR) 40 mg, oral, Daily (8a)         Allergies     No known allergies     Vital Signs/Exam     Vitals:    10/14/24 0916   BP: 134/82   Pulse: (!) 59   SpO2: 98%   Weight: 76.7 kg (169 lb)   Height: 1.626 m (5' 4\")     BP Readings from Last 3 Encounters:   10/14/24 134/82   05/02/24 122/80   04/29/24 (!) 144/78     Wt Readings from Last 3 Encounters:   10/14/24 76.7 kg (169 lb)   05/02/24 76.8 kg (169 lb 6.4 oz)   04/29/24 77.1 kg (170 lb)        Gen: no distress  HEENT: no jvd  Lungs: clear bilaterally; no crackles, rhonchi, wheezing  Chest wall: no tenderness  Heart: regular rate and rhythm; 1/6 EMMANUEL at RUSB  Abdomen: nondistended, nontender  Extremities: no edema  Neuro: nonfocal, alert and oriented  Psych: normal mood and affect     Diagnostic Data   Diagnostic data personally reviewed. Available medical records were reviewed and updated where appropriate including laboratory data, imaging " studies, and previous outpatient and inpatient records.  Counseling/education performed.     Labs:  Lab Results   Component Value Date    WBC 7.3 11/28/2023    HGB 13.8 11/28/2023     11/28/2023    ALT 24 10/10/2024    AST 20 10/10/2024     10/10/2024    K 4.8 10/10/2024     10/10/2024    CREATININE 1.1 10/10/2024    BUN 37 (H) 10/10/2024    CO2 27 10/10/2024    TSH 1.430 11/28/2023    HGBA1C 5.8 (H) 10/10/2024     Lab Results   Component Value Date    CHOL 166 10/10/2024    CHOL 161 04/19/2024    LDLCALC 76 10/10/2024    LDLCALC 80 04/19/2024    HDL 56 10/10/2024    HDL 58 04/19/2024    TRIG 170 (H) 10/10/2024    TRIG 131 04/19/2024       Echocardiogram:  11/30/23:    Left Ventricle: Normal ventricle size. Normal wall thickness. Preserved systolic function. Estimated EF 70%. No regional wall motion abnormalities. Normal diastolic filling pattern for age.    Right Ventricle: Normal ventricle size. Normal systolic function.    Aortic Valve: Tricuspid valve. No regurgitation. No stenosis.    Mitral Valve: Normal leaflet structure. Normal leaflet motion. No regurgitation. No stenosis.    Tricuspid Valve: Normal structure. Trace regurgitation. Estimated RVSP = 17 mmHg.    Aorta: Mild dilatation of the ascending aorta at 4.0 cm.    Prior Study: No prior study available for comparison.    Stress Tests:          CT Calcium Score:  4/5/2022: CAC 0      Cardiac cath:      Vascular Studies:  11/30/23:    There is mild heterogeneous noncalcified plaque in the right carotid bulb and proximal portion of the right internal carotid artery without stenosis.    There are luminal irregularities of the left carotid bulb and proximal portion of the left internal carotid artery without stenosis    Flow is antegrade in the right and left vertebral arteries    Carotid ultrasound 6/20/2022: No evidence of significant carotid atherosclerosis bilaterally.    Carotid ultrasound 6/4/2021:  Right: Grayscale evaluation  demonstrates mild calcified atherosclerotic disease within the right carotid bifurcation.  Peak systolic velocities is elevated to 131 cm/s in the proximal internal carotid artery. Based upon peak flow velocity and ratio criteria, internal carotid stenosis is estimated at 50-69 % on the right side.  Left: Grayscale evaluation demonstrates minimal atherosclerotic disease within the left carotid bifurcation.   Peak systolic velocities and spectral waveforms are within normal limits. Based upon peak flow velocity and ratio criteria, internal carotid stenosis is estimated at 0-49% on the left side.    Abdominal aortic ultrasound 5/26/2021: No evidence of abdominal aortic aneurysm    Carotid ultrasound 6/10/2019: No evidence of significant carotid atherosclerosis bilaterally.    Carotid ultrasound 11/7/2017: No hemodynamically significant narrowing of the carotid arteries.    Carotid ultrasound 6/2/2016: No evidence for hemodynamically significant stenosis within the  bilateral carotid arteries.  There is been no significant interval change when compared to the prior study from 2015. Bilateral antegrade flow within the vertebral arteries.      Peripheral:  No results found for this or any previous visit from the past 365 days.      Cardiac monitoring:      ECG independently reviewed: NSR, MIGUEL ÁNGEL, RSR'     Assessment and Plan      Forrest Mueller is a 74 y.o. female who presents today for initial office evaluation.     Diagnosis Plan   1. Dyslipidemia  [facility] MUSE ECG 12 lead (clinic performed)      2. Carotid atherosclerosis, unspecified laterality        3. Primary hypertension        4. Murmur            Hypertension  Her blood pressure has been under good overall control on her current medications.  Echocardiogram did not show evidence of LVH.    Hyperlipidemia  Mild carotid disease  Coronary calcium score of 0  She has longstanding hyperlipidemia on Crestor 40 mg daily and now recently restarted on Zetia as  well with good overall reduction in LDL, not quite to 70 but markedly improved.  -In general would target LDL of under 70 but given that she is already on maximal statin therapy as well as Zetia she does not have enough ASCVD that I would strongly push for initiation of PCSK9 inhibitor.  We discussed diet as well.     Murmur  She has a soft systolic ejection murmur with no significant valvular disease.      I will be happy to see her again in about 6 months.    Thank you for allowing me to participate in the care of this patient.     Available medical records were reviewed and updated where appropriate including laboratory data, imaging studies, and previous outpatient and inpatient records.  Counseling/education performed.      Westley Squires MD, [facility] Office: [phone]  [facility] [place]: [facility]     This document was generated utilizing voice recognition technology, typographical errors may be present.

## 2024-10-14 NOTE — LETTER
October 14, 2024     Juan Martinez MD  [address]  [place] [address]    Patient: Forrest Mueller  YOB: 1950  Date of Visit: 10/14/2024      Dear Dr. Martinez:    Thank you for referring Forrest Mueller to me for evaluation. Below are my notes for this consultation.    If you have questions, please do not hesitate to call me. I look forward to following your patient along with you.         Sincerely,        Westley Squires MD        CC: No Recipients    Westley Squires MD  10/14/2024 12:50 PM  Sign when Signing Visit     [facility] Cardiology  [place] Office Visit       Patient ID: Forrest Mueller 74 y.o. female 1950  PCP: Juan Martinez MD      HPI     Forrest Mueller is a 74 y.o. female presenting for cardiovascular follow-up.    She has a past medical history significant for hypertension, dyslipidemia with CAC 0 (April 2022), mild carotid disease, impaired fasting glucose, GERD, diverticulosis, thyroid nodule, depression, and family history of CVA.     She presents today for routine follow-up.  Since her last visit with me she is generally doing well and is not having any new symptoms or new concerns.  She was recently on vacation with her  in [place] and was quite active and walking a lot without any significant symptoms.  She does also note that she was eating a lot of foods that she knows were not great for her while she was there and just had some blood work done.    She has been checking her blood pressure at home and it has been under good control.  She notes that her sugars are in the borderline diabetic range.      She feels quite well symptomatically and denies chest pain, shortness of breath, palpitations, syncope, or lower extremity edema or claudication.  She had some cramping in her legs at night but no claudication symptoms.    She is walking regularly without symptoms.             Past History      Past Medical History:  "  Diagnosis Date   • Basal cell carcinoma    • Depression    • Diverticulitis of colon    • Endometrioma    • Hypertension    • Lipid disorder    • Osteoarthritis    • Thyroid nodule        Past Surgical History   Procedure Laterality Date   • Dilation and curettage of uterus     • Eye surgery     • Hysterectomy     • Laparotomy oopherectomy     • Wrist surgery Right        Social History     Social History Narrative   • Not on file       Family History   Problem Relation Name Age of Onset   • Breast cancer Biological Mother  80   • Hypertension Biological Mother     • Stroke Biological Mother     • Colon cancer Biological Father     • Hypertension Biological Father     • Stroke Father's Sister     • Colon cancer Father's Brother     • Stroke Paternal Grandmother     • Colon cancer Cousin          Medications     Current Outpatient Medications   Medication Instructions   • aspirin 81 mg, oral, Daily   • calcium carbonate/vitamin D3 (CALCIUM 500 WITH D ORAL) No dose, route, or frequency recorded.   • empagliflozin (JARDIANCE) 10 mg, oral, Daily   • ezetimibe (ZETIA) 10 mg, Nightly   • famotidine (PEPCID) 40 mg, oral, Nightly   • fluticasone propionate (FLONASE) 50 mcg/actuation nasal spray Daily PRN   • hydrochlorothiazide (HYDRODIURIL) 25 mg, oral, Daily   • hydrocortisone (ANUSOL-HC) 25 mg, rectal, 2 times daily PRN   • metoprolol succinate XL (TOPROL-XL) 100 mg, oral, Daily   • naproxen (NAPROSYN) 500 mg, oral, Daily   • pantoprazole (PROTONIX) 40 mg, oral, Daily   • PARoxetine CR (PAXIL-CR) 12.5 mg, oral, Every morning   • ramipriL (ALTACE) 10 mg, oral, Daily   • ramipriL (ALTACE) 20 mg, oral, Daily   • rosuvastatin (CRESTOR) 40 mg, oral, Daily (8a)         Allergies     No known allergies     Vital Signs/Exam     Vitals:    10/14/24 0916   BP: 134/82   Pulse: (!) 59   SpO2: 98%   Weight: 76.7 kg (169 lb)   Height: 1.626 m (5' 4\")     BP Readings from Last 3 Encounters:   10/14/24 134/82   05/02/24 122/80 "   04/29/24 (!) 144/78     Wt Readings from Last 3 Encounters:   10/14/24 76.7 kg (169 lb)   05/02/24 76.8 kg (169 lb 6.4 oz)   04/29/24 77.1 kg (170 lb)        Gen: no distress  HEENT: no jvd  Lungs: clear bilaterally; no crackles, rhonchi, wheezing  Chest wall: no tenderness  Heart: regular rate and rhythm; 1/6 EMMANUEL at RUSB  Abdomen: nondistended, nontender  Extremities: no edema  Neuro: nonfocal, alert and oriented  Psych: normal mood and affect     Diagnostic Data   Diagnostic data personally reviewed. Available medical records were reviewed and updated where appropriate including laboratory data, imaging studies, and previous outpatient and inpatient records.  Counseling/education performed.     Labs:  Lab Results   Component Value Date    WBC 7.3 11/28/2023    HGB 13.8 11/28/2023     11/28/2023    ALT 24 10/10/2024    AST 20 10/10/2024     10/10/2024    K 4.8 10/10/2024     10/10/2024    CREATININE 1.1 10/10/2024    BUN 37 (H) 10/10/2024    CO2 27 10/10/2024    TSH 1.430 11/28/2023    HGBA1C 5.8 (H) 10/10/2024     Lab Results   Component Value Date    CHOL 166 10/10/2024    CHOL 161 04/19/2024    LDLCALC 76 10/10/2024    LDLCALC 80 04/19/2024    HDL 56 10/10/2024    HDL 58 04/19/2024    TRIG 170 (H) 10/10/2024    TRIG 131 04/19/2024       Echocardiogram:  11/30/23:    Left Ventricle: Normal ventricle size. Normal wall thickness. Preserved systolic function. Estimated EF 70%. No regional wall motion abnormalities. Normal diastolic filling pattern for age.  •  Right Ventricle: Normal ventricle size. Normal systolic function.  •  Aortic Valve: Tricuspid valve. No regurgitation. No stenosis.  •  Mitral Valve: Normal leaflet structure. Normal leaflet motion. No regurgitation. No stenosis.  •  Tricuspid Valve: Normal structure. Trace regurgitation. Estimated RVSP = 17 mmHg.  •  Aorta: Mild dilatation of the ascending aorta at 4.0 cm.  •  Prior Study: No prior study available for  comparison.    Stress Tests:          CT Calcium Score:  4/5/2022: CAC 0      Cardiac cath:      Vascular Studies:  11/30/23:  •  There is mild heterogeneous noncalcified plaque in the right carotid bulb and proximal portion of the right internal carotid artery without stenosis.  •  There are luminal irregularities of the left carotid bulb and proximal portion of the left internal carotid artery without stenosis  •  Flow is antegrade in the right and left vertebral arteries    Carotid ultrasound 6/20/2022: No evidence of significant carotid atherosclerosis bilaterally.    Carotid ultrasound 6/4/2021:  Right: Grayscale evaluation demonstrates mild calcified atherosclerotic disease within the right carotid bifurcation.  Peak systolic velocities is elevated to 131 cm/s in the proximal internal carotid artery. Based upon peak flow velocity and ratio criteria, internal carotid stenosis is estimated at 50-69 % on the right side.  Left: Grayscale evaluation demonstrates minimal atherosclerotic disease within the left carotid bifurcation.   Peak systolic velocities and spectral waveforms are within normal limits. Based upon peak flow velocity and ratio criteria, internal carotid stenosis is estimated at 0-49% on the left side.    Abdominal aortic ultrasound 5/26/2021: No evidence of abdominal aortic aneurysm    Carotid ultrasound 6/10/2019: No evidence of significant carotid atherosclerosis bilaterally.    Carotid ultrasound 11/7/2017: No hemodynamically significant narrowing of the carotid arteries.    Carotid ultrasound 6/2/2016: No evidence for hemodynamically significant stenosis within the  bilateral carotid arteries.  There is been no significant interval change when compared to the prior study from 2015. Bilateral antegrade flow within the vertebral arteries.      Peripheral:  No results found for this or any previous visit from the past 365 days.      Cardiac monitoring:      ECG independently reviewed: NSR, MIGUEL ÁNGEL,  RSR'     Assessment and Plan      Forrest Mueller is a 74 y.o. female who presents today for initial office evaluation.     Diagnosis Plan   1. Dyslipidemia  [facility] MUSE ECG 12 lead (clinic performed)      2. Carotid atherosclerosis, unspecified laterality        3. Primary hypertension        4. Murmur            Hypertension  Her blood pressure has been under good overall control on her current medications.  Echocardiogram did not show evidence of LVH.    Hyperlipidemia  Mild carotid disease  Coronary calcium score of 0  She has longstanding hyperlipidemia on Crestor 40 mg daily and now recently restarted on Zetia as well with good overall reduction in LDL, not quite to 70 but markedly improved.  -In general would target LDL of under 70 but given that she is already on maximal statin therapy as well as Zetia she does not have enough ASCVD that I would strongly push for initiation of PCSK9 inhibitor.  We discussed diet as well.     Murmur  She has a soft systolic ejection murmur with no significant valvular disease.      I will be happy to see her again in about 6 months.    Thank you for allowing me to participate in the care of this patient.     Available medical records were reviewed and updated where appropriate including laboratory data, imaging studies, and previous outpatient and inpatient records.  Counseling/education performed.      Westley Squires MD, [facility] [place] Office: [phone]  Primary [facility] [place]: [facility]     This document was generated utilizing voice recognition technology, typographical errors may be present.

## 2024-11-04 ENCOUNTER — OFFICE VISIT (OUTPATIENT)
Dept: INTERNAL MEDICINE | Facility: CLINIC | Age: 74
End: 2024-11-04
Payer: COMMERCIAL

## 2024-11-04 VITALS
RESPIRATION RATE: 16 BRPM | DIASTOLIC BLOOD PRESSURE: 62 MMHG | BODY MASS INDEX: 28.68 KG/M2 | OXYGEN SATURATION: 98 % | TEMPERATURE: 98.8 F | SYSTOLIC BLOOD PRESSURE: 130 MMHG | WEIGHT: 168 LBS | HEART RATE: 78 BPM | HEIGHT: 64 IN

## 2024-11-04 DIAGNOSIS — R73.9 HYPERGLYCEMIA: ICD-10-CM

## 2024-11-04 DIAGNOSIS — Z78.0 MENOPAUSE: ICD-10-CM

## 2024-11-04 DIAGNOSIS — Z00.00 MEDICARE ANNUAL WELLNESS VISIT, SUBSEQUENT: Primary | ICD-10-CM

## 2024-11-04 DIAGNOSIS — F34.1 PERSISTENT DEPRESSIVE DISORDER: ICD-10-CM

## 2024-11-04 DIAGNOSIS — I10 ESSENTIAL HYPERTENSION: ICD-10-CM

## 2024-11-04 DIAGNOSIS — E78.2 MIXED HYPERLIPIDEMIA: ICD-10-CM

## 2024-11-04 DIAGNOSIS — K21.9 GASTROESOPHAGEAL REFLUX DISEASE WITHOUT ESOPHAGITIS: ICD-10-CM

## 2024-11-04 DIAGNOSIS — J30.1 ALLERGIC RHINITIS DUE TO POLLEN, UNSPECIFIED SEASONALITY: ICD-10-CM

## 2024-11-04 LAB
CREAT UR-MCNC: 88.3 MG/DL
MICROALBUMIN UR-MCNC: 10 MG/L
MICROALBUMIN/CREAT UR: 11.3 UG/MG

## 2024-11-04 PROCEDURE — G0439 PPPS, SUBSEQ VISIT: HCPCS | Performed by: INTERNAL MEDICINE

## 2024-11-04 PROCEDURE — 82043 UR ALBUMIN QUANTITATIVE: CPT | Performed by: INTERNAL MEDICINE

## 2024-11-04 PROCEDURE — 1123F ACP DISCUSS/DSCN MKR DOCD: CPT | Performed by: INTERNAL MEDICINE

## 2024-11-04 PROCEDURE — 82570 ASSAY OF URINE CREATININE: CPT | Performed by: INTERNAL MEDICINE

## 2024-11-04 RX ORDER — RAMIPRIL 10 MG/1
20 CAPSULE ORAL DAILY
Qty: 180 CAPSULE | Refills: 3 | Status: SHIPPED | OUTPATIENT
Start: 2024-11-04 | End: 2025-11-04

## 2024-11-04 RX ORDER — RAMIPRIL 10 MG/1
10 CAPSULE ORAL DAILY
Qty: 90 CAPSULE | Refills: 1 | Status: CANCELLED | OUTPATIENT
Start: 2024-11-04

## 2024-11-04 RX ORDER — PANTOPRAZOLE SODIUM 40 MG/1
40 TABLET, DELAYED RELEASE ORAL DAILY
Qty: 90 TABLET | Refills: 1 | Status: SHIPPED | OUTPATIENT
Start: 2024-11-04 | End: 2025-05-03

## 2024-11-04 RX ORDER — PNV NO.95/FERROUS FUM/FOLIC AC 28MG-0.8MG
TABLET ORAL
Status: CANCELLED | OUTPATIENT
Start: 2024-11-04 | End: 2024-12-04

## 2024-11-04 RX ORDER — HYDROCHLOROTHIAZIDE 25 MG/1
25 TABLET ORAL DAILY
Qty: 90 TABLET | Refills: 3 | Status: SHIPPED | OUTPATIENT
Start: 2024-11-04

## 2024-11-04 RX ORDER — FAMOTIDINE 40 MG/1
40 TABLET, FILM COATED ORAL NIGHTLY
Qty: 90 TABLET | Refills: 3 | Status: SHIPPED | OUTPATIENT
Start: 2024-11-04

## 2024-11-04 RX ORDER — PAROXETINE HYDROCHLORIDE HEMIHYDRATE 12.5 MG/1
12.5 TABLET, FILM COATED, EXTENDED RELEASE ORAL EVERY MORNING
Qty: 90 TABLET | Refills: 3 | Status: SHIPPED | OUTPATIENT
Start: 2024-11-04 | End: 2025-02-15 | Stop reason: SDUPTHER

## 2024-11-04 RX ORDER — EZETIMIBE 10 MG/1
10 TABLET ORAL NIGHTLY
Qty: 90 TABLET | Refills: 3 | Status: SHIPPED | OUTPATIENT
Start: 2024-11-04 | End: 2025-11-04

## 2024-11-04 RX ORDER — FLUTICASONE PROPIONATE 50 MCG
1 SPRAY, SUSPENSION (ML) NASAL DAILY PRN
Qty: 16 G | Refills: 0 | Status: SHIPPED | OUTPATIENT
Start: 2024-11-04

## 2024-11-04 RX ORDER — NAPROXEN 500 MG/1
500 TABLET ORAL DAILY
Qty: 90 TABLET | Refills: 3 | Status: SHIPPED | OUTPATIENT
Start: 2024-11-04

## 2024-11-04 RX ORDER — NAPROXEN SODIUM 220 MG/1
81 TABLET, FILM COATED ORAL DAILY
Qty: 90 TABLET | Refills: 3 | Status: CANCELLED | OUTPATIENT
Start: 2024-11-04

## 2024-11-04 RX ORDER — ROSUVASTATIN CALCIUM 40 MG/1
40 TABLET, COATED ORAL
Qty: 90 TABLET | Refills: 3 | Status: SHIPPED | OUTPATIENT
Start: 2024-11-04

## 2024-11-04 RX ORDER — METOPROLOL SUCCINATE 100 MG/1
100 TABLET, EXTENDED RELEASE ORAL DAILY
Qty: 90 TABLET | Refills: 3 | Status: SHIPPED | OUTPATIENT
Start: 2024-11-04

## 2024-11-04 RX ORDER — HYDROCORTISONE ACETATE 25 MG/1
25 SUPPOSITORY RECTAL 2 TIMES DAILY PRN
Qty: 20 SUPPOSITORY | Refills: 0 | Status: CANCELLED | OUTPATIENT
Start: 2024-11-04

## 2024-11-04 ASSESSMENT — ENCOUNTER SYMPTOMS
BACK PAIN: 0
ABDOMINAL PAIN: 0
WEAKNESS: 0
NECK STIFFNESS: 0
DIARRHEA: 0
ACTIVITY CHANGE: 0
STRIDOR: 0
NUMBNESS: 0
VOICE CHANGE: 0
COLOR CHANGE: 0
FLANK PAIN: 0
MYALGIAS: 0
SINUS PRESSURE: 0
SINUS PAIN: 0
ARTHRALGIAS: 0
CHILLS: 0
JOINT SWELLING: 0
POLYDIPSIA: 0
FEVER: 0
PALPITATIONS: 0
SPEECH DIFFICULTY: 0
NAUSEA: 0
NECK PAIN: 0
SLEEP DISTURBANCE: 0
RHINORRHEA: 0
WHEEZING: 0
TREMORS: 0
ABDOMINAL DISTENTION: 0
FATIGUE: 0
SHORTNESS OF BREATH: 0
SEIZURES: 0
FREQUENCY: 0
DIZZINESS: 0
LIGHT-HEADEDNESS: 0
NERVOUS/ANXIOUS: 0
BLOOD IN STOOL: 0
POLYPHAGIA: 0
HEADACHES: 0
COUGH: 0

## 2024-11-04 ASSESSMENT — PATIENT HEALTH QUESTIONNAIRE - PHQ9: SUM OF ALL RESPONSES TO PHQ9 QUESTIONS 1 & 2: 0

## 2024-11-04 ASSESSMENT — MINI COG: TOTAL SCORE: 5

## 2024-11-04 NOTE — PATIENT INSTRUCTIONS
Your Personalized Prevention Plan Services (PPPS)    Preventive Services Checklist (Assumes Average Risk Unless Otherwise Noted):    Health Maintenance Topics with due status: Overdue       Topic Date Due    RSV Vaccine Never done    Falls Risk Screening Never done    Zoster Vaccine 12/31/2020    Medicare Annual Wellness Visit 07/14/2024    Influenza Vaccine 08/01/2024    COVID-19 Vaccine 09/01/2024     Health Maintenance Topics with due status: Not Due       Topic Last Completion Date    DTaP, Tdap, and Td Vaccines 09/23/2015    DEXA Scan 05/06/2024    Breast Cancer Screening 06/20/2024    Colorectal Cancer Screening 10/21/2024    Depression Screening 11/04/2024     Health Maintenance Topics with due status: Completed       Topic Last Completion Date    Pneumococcal (65 years and older) 03/02/2020     Health Maintenance Topics with due status: Aged Out       Topic Date Due    Meningococcal ACWY Aged Out    RSV <20 months Aged Out    HIB Vaccines Aged Out    Hepatitis B Vaccines Aged Out    IPV Vaccines Aged Out    HPV Vaccines Aged Out       You May Be Eligible for These Additional Preventive Services   (Assumes Average Risk Unless Otherwise Noted)  Diabetes Screening Any 1 risk factor: hypertension, dyslipidemia, obesity, high glucose; or Any 2 risk factors: >=66yo, overweight, family history diabetes (covered every 6 months)   Hepatitis C Screening Any 1 risk factor: 1) blood transfusion before 1992,   2) current or past injection drug use (annually for high risk; if born between 2756-1734, see above for status).   Vaccine: Hepatitis B As necessary if at-risk: hemophilia, ESRD, diabetes, living with individual infected with hep B, healthcare worker with frequent contact with blood/bodily fluids (series covered once)   Sexually Transmitted Diseases (STDs) As necessary chlamydia, gonorrhea, syphilis, hepatitis B (covered annually)  HIV if any 1 risk factor present: 1) <14yo or >66yo and at  increased risk or 2) 15-64yo and ask for it (covered annually)   Lung Cancer Screening Low dose chest CT if all three risk factors: 1) 50-76yo, 2) smoker or quit within last 15y, 3) >=20 pack years (covered annually).  No results found for this or any previous visit.       Cholesterol Screening Both risk factors: 1) >=19yo and 2)  increased risk coronary artery disease (covered every 5 years).   Breast Cancer Screening Covered once 35-38yo, annually >=39yo (if >=49yo, see above for status).       Health Risk Factors with Personalized Education:  ----------------------------------------------------------------------------------------------------------------------  Stress management:  Understanding Your Stress  Think about how you know when you’re stressed.  Think about how your thoughts or behaviors are different when you’re stressed.  Think about what triggers stress for you.  Think about how you handle stress, and whether you’re making unhealthy choices in response to stress (like smoking, drinking alcohol or overeating).  Managing Stress  Take a break from the stressful situation.  Reduce your stress levels by exercising, talking with family/friends, ensuring adequate sleep.  Consider meditation or yoga.  Make sure you plan time to do things you enjoy.  Let your PCP know if you’re having problems limiting your stress.  ----------------------------------------------------------------------------------------------------------------------  Controlling Your Blood Pressure  Maintain a normal weight (body mass index between 18.5 and 24.9).  Eat more fruit, vegetables and low-fat dairy.  Eat less saturated fat and total fat.  Lower your sodium (salt) intake.  Try to stay under 1500 mg per day, but if you cannot get your intake to be that low, at least lower it by 1000 mg.  Stay active.  Try to get at least 90 to 150 minutes of exercise per week.  Try brisk walking, swimming, bicycling or dancing.  Limit alcohol intake.   When you do consume alcohol, drink no more than 1 drink per day.  If you have been prescribed medication, take it regularly and exactly as prescribed.  Let your PCP know if you have any problems or questions about your medication.  Check your blood pressure at home or at the store.  Write down your readings and share them with your PCP  ----------------------------------------------------------------------------------------------------------------------  Controlling Your Cholesterol  Reduce the amount of saturated and trans fat in your diet.  Limit intake of red meat.  Consume only low-fat or non-fat/skim dairy.  Limit fried food.  Cook with vegetable oils.  Reduce your intake of sugary foods, sugary drinks and alcohol.  Eat a diet high in fruit, vegetables and whole grains.  Get protein from fish, poultry and a small portion of nuts.  Stay active.  Try to get at least 90 to 150 minutes of exercise per week.  Try brisk walking, swimming, bicycling or dancing.  Maintain a healthy weight by balancing your diet and exercise.  If you have been prescribed medication, take it regularly and exactly as prescribed. Let your PCP know if you have any problems or questions about your medication.  It’s important to know your cholesterol numbers.  When recommended by your PCP, get the cholesterol blood test.  ----------------------------------------------------------------------------------------------------------------------  Controlling Your Osteopenia, Strengthening Your Bones  Try to get at least 90 to 150 minutes of weight-bearing exercise per week.  Ensure intake of at least 1200mg of calcium per day.  Eat foods high in calcium like milk and other dairy, green vegetables, fruit, canned fish with soft and edible bones, nuts, calcium-set tofu.  Some foods are calcium-fortified, like bread, cereal, fruit juices and mineral water.  Help your body make vitamin D by getting 10-15 minutes per day of sunlight.    Ensure intake of  at least 600IU of vitamin D per day.  Eat foods high in vitamin D like oily fish (salmon, sardines, mackerel) and eggs.  Some foods are fortified with vitamin D, like dairy and cereals.  Avoid high amounts of caffeine and salt, since they can cause the body to loose calcium.  Limit alcohol intake, since it is associated with weaker bones and is associated with falls and fractures.  Limit intake of fizzy drinks.  If you have been prescribed medication, take it regularly and exactly as prescribed.  Let your PCP know if you have any problems or questions about your medication  ----------------------------------------------------------------------------------------------------------------------  Reducing Your Risk of Falls  Tell your PCP if any of your medications make you feel tired, dizzy, lightheaded or off-balance.  Maintain coordination, flexibility and balance by ensuring regular physical activity.  Limit alcohol intake to 1 drink per day.  Consider avoiding all alcohol intake.  Ensure good vision.  Visit an ophthalmologist or optometrist regularly for vision screening or to make sure your glasses / contact lens prescription is correct.  If you need glasses or contacts, wear them.  When you get new glasses or contacts, take time to get used to them.  Do not wear sunglasses or tinted lenses when indoors.  Ensure good hearing.  Have your hearing checked if you are having trouble hearing, or family and friends think you cannot hear them.  If you need a hearing aid, be sure it fits well and wear it.  Get enough rest.  Ensure about 7-9 hours of sleep every day.  Get up slowly from your bed or chairs.  Do not start walking until you are sure you feel steady.  Wear non-skid, rubber-soled, low-heeled shoes.  Do not walk in socks, or in shoes and slippers with smooth soles.  If your PCP or therapist recommends using a cane or walker, use it regularly.  Make your home safer.  Increase lighting throughout the house,  especially at the top and bottom of stairs.  Ensure lighting is easily turned on when getting up in the middle of the night.  Make sure there are two secure rails on all stairs.  Install grab bars in the bathtub / shower and near the toilet.  Consider using a shower chair and / or a hand-held shower.  Spread sand or salt on icy surfaces.  Beware of wet surfaces, which can be icy.  Tell your PCP if you have fallen.

## 2024-11-04 NOTE — PROGRESS NOTES
Consent obtained from patient and all parties present in the room? yes     I have obtained the consent of everyone present in the room to make an audio recording of this visit to assist me in documenting the encounter in the EMR.      Chief Complaint   Patient presents with    Medicare Subsequent Annual Wellness Visit       History of Present Illness  The patient is a 74-year-old female who presents for Medicare wellness checkup.    She recently consulted her cardiologist, Dr. Squires, and underwent a colonoscopy, which yielded normal results.  Her cardiologist did not recommend any changes to her treatment plan.   She needs all of her medication refills.    IMMUNIZATIONS  She received her influenza vaccine this year.      Subjective     Marquita Mueller is a 74 y.o. female who presents for a subsequent annual wellness visit.     Patient Care Team:  Juan Martinez MD as PCP - General (Internal Medicine)  Westley Squires MD as Cardiologist (Cardiology)  Danyel Daily MD as Consulting Physician (Gastroenterology)    Comprehensive Medical and Social History  Patient Active Problem List   Diagnosis    Blood glucose abnormal    Chronic coronary artery disease    Basal cell carcinoma    Chicken pox    Right-sided carotid artery disease, unspecified type (CMS/HCC)    Diverticulosis    Endometriosis    Family history of malignant neoplasm of breast    Family history of cardiovascular disease    Goiter    Hemorrhoids    History of non anemic vitamin B12 deficiency    Essential hypertension    Hyperlipidemia    Osteoarthritis    Osteopenia    Thyroid nodule    Peptic ulcer disease    Breast pain, left    Major depressive disorder with single episode, in full remission (CMS/HCC)    Medicare annual wellness visit, subsequent    Sciatica of left side     Past Medical History:   Diagnosis Date    Basal cell carcinoma     Depression     Diverticulitis of colon     Endometrioma     Hypertension     Lipid disorder      Osteoarthritis     Thyroid nodule      Past Surgical History   Procedure Laterality Date    Dilation and curettage of uterus      Eye surgery      Hysterectomy      Laparotomy oopherectomy      Wrist surgery Right      Allergies   Allergen Reactions    No Known Allergies      Current Outpatient Medications   Medication Sig Dispense Refill    aspirin 81 mg chewable tablet TAKE 1 TABLET DAILY 90 tablet 3    calcium carbonate/vitamin D3 (CALCIUM 500 WITH D ORAL)       empagliflozin (JARDIANCE) 10 mg tablet Take 1 tablet (10 mg total) by mouth daily. 90 tablet 1    ezetimibe (ZETIA) 10 mg tablet Take 10 mg by mouth nightly.      famotidine (PEPCID) 40 mg tablet Take 1 tablet (40 mg total) by mouth nightly. 90 tablet 3    fluticasone propionate (FLONASE) 50 mcg/actuation nasal spray daily as needed.      hydrochlorothiazide (HYDRODIURIL) 25 mg tablet Take 1 tablet (25 mg total) by mouth daily. 90 tablet 3    hydrocortisone (ANUSOL-HC) 25 mg suppository Insert 1 suppository (25 mg total) into the rectum 2 (two) times a day as needed for hemorrhoids. 20 suppository 0    metoprolol succinate XL (TOPROL-XL) 100 mg 24 hr tablet Take 1 tablet (100 mg total) by mouth daily. 90 tablet 3    naproxen (NAPROSYN) 500 mg tablet Take 1 tablet (500 mg total) by mouth daily. 90 tablet 1    pantoprazole (PROTONIX) 40 mg EC tablet Take 1 tablet (40 mg total) by mouth daily. 90 tablet 1    PARoxetine CR (PAXIL-CR) 12.5 mg 24 hr tablet TAKE 1 TABLET EVERY MORNING 14 tablet 25    ramipriL (ALTACE) 10 mg capsule Take 1 capsule (10 mg total) by mouth daily. (Patient taking differently: Take 10 mg by mouth daily. Take 2 tablets (20mg) daily) 90 capsule 1    ramipriL (ALTACE) 10 mg capsule Take 2 capsules (20 mg total) by mouth daily. 180 capsule 3    rosuvastatin (CRESTOR) 40 mg tablet Take 1 tablet (40 mg total) by mouth once daily. 90 tablet 3     No current facility-administered medications for this visit.     Social History     Tobacco Use     Smoking status: Never    Smokeless tobacco: Never   Vaping Use    Vaping status: Never Used   Substance Use Topics    Alcohol use: Yes     Comment: Wine social    Drug use: Never     Family History   Problem Relation Name Age of Onset    Breast cancer Biological Mother  80    Hypertension Biological Mother      Stroke Biological Mother      Colon cancer Biological Father      Hypertension Biological Father      Stroke Father's Sister      Colon cancer Father's Brother      Stroke Paternal Grandmother      Colon cancer Cousin         Review of Systems   Constitutional:  Negative for activity change, chills, fatigue and fever.   HENT:  Negative for congestion, hearing loss, rhinorrhea, sinus pressure, sinus pain and voice change.    Eyes:  Negative for visual disturbance.   Respiratory:  Negative for cough, shortness of breath, wheezing and stridor.    Cardiovascular:  Negative for chest pain, palpitations and leg swelling.   Gastrointestinal:  Negative for abdominal distention, abdominal pain, blood in stool, diarrhea and nausea.   Endocrine: Negative for cold intolerance, heat intolerance, polydipsia, polyphagia and polyuria.   Genitourinary:  Negative for dyspareunia, flank pain and frequency.   Musculoskeletal:  Negative for arthralgias, back pain, gait problem, joint swelling, myalgias, neck pain and neck stiffness.   Skin:  Negative for color change and pallor.   Allergic/Immunologic: Negative for environmental allergies, food allergies and immunocompromised state.   Neurological:  Negative for dizziness, tremors, seizures, syncope, speech difficulty, weakness, light-headedness, numbness and headaches.   Psychiatric/Behavioral:  Negative for behavioral problems, self-injury and sleep disturbance. The patient is not nervous/anxious.         Objective   Vitals  Vitals:    11/04/24 0859   BP: 130/62   BP Location: Right upper arm   Patient Position: Sitting   Pulse: 78   Resp: 16   Temp: 37.1 °C (98.8 °F)  "  TempSrc: Oral   SpO2: 98%   Weight: 76.2 kg (168 lb)   Height: 1.626 m (5' 4\")     Body mass index is 28.84 kg/m².      Physical Exam  Nursing note reviewed.   Constitutional:       Appearance: Normal appearance.   HENT:      Head: Normocephalic.      Mouth/Throat:      Mouth: Mucous membranes are moist.   Eyes:      Extraocular Movements: Extraocular movements intact.      Conjunctiva/sclera: Conjunctivae normal.      Pupils: Pupils are equal, round, and reactive to light.   Cardiovascular:      Rate and Rhythm: Normal rate and regular rhythm.      Pulses: Normal pulses.           Dorsalis pedis pulses are 2+ on the right side and 2+ on the left side.        Posterior tibial pulses are 2+ on the right side and 2+ on the left side.   Pulmonary:      Effort: Pulmonary effort is normal.      Breath sounds: Normal breath sounds.   Abdominal:      General: Abdomen is flat. Bowel sounds are normal.      Palpations: Abdomen is soft.   Musculoskeletal:         General: No swelling, tenderness or deformity. Normal range of motion.      Cervical back: Normal range of motion and neck supple.      Right lower leg: No edema.      Left lower leg: No edema.   Feet:      Right foot:      Protective Sensation: 3 sites tested.  3 sites sensed.      Skin integrity: Skin integrity normal.      Left foot:      Protective Sensation: 3 sites tested.  3 sites sensed.      Skin integrity: Skin integrity normal.   Skin:     General: Skin is warm.   Neurological:      General: No focal deficit present.      Mental Status: She is alert and oriented to person, place, and time. Mental status is at baseline.      Cranial Nerves: No cranial nerve deficit.      Sensory: No sensory deficit.      Gait: Gait normal.      Deep Tendon Reflexes: Reflexes normal.   Psychiatric:         Mood and Affect: Mood normal.         Behavior: Behavior normal.           Latest Reference Range & Units 10/10/24 08:09   Sodium 136 - 145 mEQ/L 141   Potassium, Bld " 3.5 - 5.1 mEQ/L 4.8   Chloride 98 - 107 mEQ/L 106   CO2 21 - 31 mEQ/L 27   BUN 7 - 25 mg/dL 37 (H)   Creatinine 0.6 - 1.2 mg/dL 1.1   Glucose 70 - 99 mg/dL 100 (H)   Calcium 8.6 - 10.3 mg/dL 9.5   AST (SGOT) 13 - 39 IU/L 20   ALT (SGPT) 7 - 52 IU/L 24   Alkaline Phosphatase 34 - 125 IU/L 51   Total Protein 6.0 - 8.2 g/dL 6.6   Albumin 3.5 - 5.7 g/dL 4.1   Bilirubin, Total 0.3 - 1.2 mg/dL 0.5   eGFR >=60.0 mL/min/1.73m*2 53.2 (L)   Anion Gap 3 - 15 mEQ/L 8   Cholesterol <=200 mg/dL 166   Triglycerides <150 mg/dL 170 (H)   HDL >=50 mg/dL 56   LDL Calculated <=100 mg/dL 76   Non-HDL, Calculated mg/dL 110   RISK <=5.0  3.0   Hemoglobin A1C <5.7 % 5.8 (H)   Estimated Ave Glucose mg/dL 120     Advanced Care Plan  Does patient have advance directive?: Yes    Advance care plan discussed and documented in the medical record    Patient has Advance Directive: Advance Directive in physical chart   Does patient have current OOH DNR form?: Yes       Patient has current OOH DNR form: OOH DNR in physical chart   Does patient have current POLST?: Yes       Patient has current POLST: POLST in physical chart     PHQ  Will the patient answer the depression questions?: Yes   Little interest or pleasure in doing things: Not at all   Feeling down, depressed, or hopeless: Not at all   Depression Risk: 0         Mini Cog  Score: 5  Result: Negative      Get Up and Go       STEADI Falls Risk  One or more falls in the last year: No           Has trouble stepping up onto a curb: No   Advised to use a cane or walker to get around safely: No   Often has to rush to the toilet: No   Feels unsteady when walking: No   Has lost some feeling in feet: No   Often feels sad or depressed: No   Steadies self on furniture while walking at home: No   Takes medication that makes him/her feel lightheaded or more tired than usual: No   Worried about falling: No       Needs to push with hands when rising from a chair: No   Falls screen completed: Yes     Hearing  and Vision Screening  No results found.  See HRA for relevant hearing screening response.    Diet and Exercise            1. Medicare annual wellness visit, subsequent (Primary)      74 years old female with controlled depression, hypertension, GERD, hyperglycemia, hyperlipidemia and allergic rhinitis who has unremarkable wellness exam.  Above-mentioned lab results reviewed with the patient.  Patient is up-to-date on influenza vaccination.  Bone density test.  Prescription refill dispensed.  See below.  Follow-up in 6 months.    2. Persistent depressive disorder    - PARoxetine CR (PAXIL-CR) 12.5 mg 24 hr tablet; Take 1 tablet (12.5 mg total) by mouth every morning.  Dispense: 90 tablet; Refill: 3    3. Essential hypertension    - metoprolol succinate XL (TOPROL-XL) 100 mg 24 hr tablet; Take 1 tablet (100 mg total) by mouth daily.  Dispense: 90 tablet; Refill: 3  - hydrochlorothiazide (HYDRODIURIL) 25 mg tablet; Take 1 tablet (25 mg total) by mouth daily.  Dispense: 90 tablet; Refill: 3  - ramipriL (ALTACE) 10 mg capsule; Take 2 capsules (20 mg total) by mouth daily.  Dispense: 180 capsule; Refill: 3    4. Gastroesophageal reflux disease without esophagitis    - famotidine (PEPCID) 40 mg tablet; Take 1 tablet (40 mg total) by mouth nightly.  Dispense: 90 tablet; Refill: 3  - pantoprazole (PROTONIX) 40 mg EC tablet; Take 1 tablet (40 mg total) by mouth daily.  Dispense: 90 tablet; Refill: 1    5. Hyperglycemia    - empagliflozin (JARDIANCE) 10 mg tablet; Take 1 tablet (10 mg total) by mouth daily.  Dispense: 90 tablet; Refill: 3    6. Mixed hyperlipidemia    - rosuvastatin (CRESTOR) 40 mg tablet; Take 1 tablet (40 mg total) by mouth once daily.  Dispense: 90 tablet; Refill: 3  - Microalbumin/Creatinine Ur Random    7. Allergic rhinitis due to pollen, unspecified seasonality    - fluticasone propionate (FLONASE) 50 mcg/actuation nasal spray; Administer 1 spray into each nostril daily as needed for rhinitis.  Dispense:  16 g; Refill: 0    8. Menopause    - DEXA BONE DENSITY; Future    Cho MD Juan      See Patient Instructions (the written plan) which was given to the patient for PPPS and health risk factors with interventions.

## 2024-11-19 ENCOUNTER — DOCUMENTATION (OUTPATIENT)
Dept: INTERNAL MEDICINE | Facility: CLINIC | Age: 74
End: 2024-11-19
Payer: COMMERCIAL

## 2024-11-19 NOTE — PROGRESS NOTES
Lacey Greer MA has completed a chart review for Kanikamarkos OFELIA Mueller and have determined that the care gap has been satisfied.    Care Gap Source: [employer]    Care Gap(s) Identified: Annual Wellness Visit, Diabetic Nephropathy Screening    Chart Review Completed: Yes  [facility] screen completed 11/2024

## 2024-12-13 RX ORDER — NAPROXEN SODIUM 220 MG/1
81 TABLET, FILM COATED ORAL DAILY
Qty: 90 TABLET | Refills: 3 | Status: SHIPPED | OUTPATIENT
Start: 2024-12-13 | End: 2025-02-15 | Stop reason: SDUPTHER

## 2024-12-13 NOTE — TELEPHONE ENCOUNTER
Medicine Refill Request    Last Office Visit: 11/4/2024   Last Consult Visit: Visit date not found  Last Telemedicine Visit: Visit date not found    Next Appointment: 5/19/2025      Current Outpatient Medications:     aspirin 81 mg chewable tablet, TAKE 1 TABLET DAILY, Disp: 90 tablet, Rfl: 3    calcium carbonate/vitamin D3 (CALCIUM 500 WITH D ORAL), , Disp: , Rfl:     empagliflozin (JARDIANCE) 10 mg tablet, Take 1 tablet (10 mg total) by mouth daily., Disp: 90 tablet, Rfl: 3    ezetimibe (ZETIA) 10 mg tablet, Take 1 tablet (10 mg total) by mouth nightly., Disp: 90 tablet, Rfl: 3    famotidine (PEPCID) 40 mg tablet, Take 1 tablet (40 mg total) by mouth nightly., Disp: 90 tablet, Rfl: 3    fluticasone propionate (FLONASE) 50 mcg/actuation nasal spray, Administer 1 spray into each nostril daily as needed for rhinitis., Disp: 16 g, Rfl: 0    hydrochlorothiazide (HYDRODIURIL) 25 mg tablet, Take 1 tablet (25 mg total) by mouth daily., Disp: 90 tablet, Rfl: 3    hydrocortisone (ANUSOL-HC) 25 mg suppository, Insert 1 suppository (25 mg total) into the rectum 2 (two) times a day as needed for hemorrhoids., Disp: 20 suppository, Rfl: 0    metoprolol succinate XL (TOPROL-XL) 100 mg 24 hr tablet, Take 1 tablet (100 mg total) by mouth daily., Disp: 90 tablet, Rfl: 3    naproxen (NAPROSYN) 500 mg tablet, Take 1 tablet (500 mg total) by mouth daily., Disp: 90 tablet, Rfl: 3    pantoprazole (PROTONIX) 40 mg EC tablet, Take 1 tablet (40 mg total) by mouth daily., Disp: 90 tablet, Rfl: 1    PARoxetine CR (PAXIL-CR) 12.5 mg 24 hr tablet, Take 1 tablet (12.5 mg total) by mouth every morning., Disp: 90 tablet, Rfl: 3    ramipriL (ALTACE) 10 mg capsule, Take 2 capsules (20 mg total) by mouth daily., Disp: 180 capsule, Rfl: 3    rosuvastatin (CRESTOR) 40 mg tablet, Take 1 tablet (40 mg total) by mouth once daily., Disp: 90 tablet, Rfl: 3      BP Readings from Last 3 Encounters:   11/04/24 130/62   10/14/24 134/82   05/02/24 122/80        Recent Lab results:  Lab Results   Component Value Date    CHOL 166 10/10/2024   ,   Lab Results   Component Value Date    HDL 56 10/10/2024   ,   Lab Results   Component Value Date    LDLCALC 76 10/10/2024   ,   Lab Results   Component Value Date    TRIG 170 (H) 10/10/2024        Lab Results   Component Value Date    GLUCOSE 100 (H) 10/10/2024   ,   Lab Results   Component Value Date    HGBA1C 5.8 (H) 10/10/2024         Lab Results   Component Value Date    CREATININE 1.1 10/10/2024       Lab Results   Component Value Date    TSH 1.430 11/28/2023           Lab Results   Component Value Date    HGBA1C 5.8 (H) 10/10/2024

## 2025-02-15 DIAGNOSIS — F34.1 PERSISTENT DEPRESSIVE DISORDER: ICD-10-CM

## 2025-02-17 DIAGNOSIS — F34.1 PERSISTENT DEPRESSIVE DISORDER: ICD-10-CM

## 2025-02-17 RX ORDER — PAROXETINE HYDROCHLORIDE HEMIHYDRATE 12.5 MG/1
12.5 TABLET, FILM COATED, EXTENDED RELEASE ORAL EVERY MORNING
Qty: 90 TABLET | Refills: 3 | Status: SHIPPED | OUTPATIENT
Start: 2025-02-17 | End: 2025-02-17 | Stop reason: SDUPTHER

## 2025-02-17 RX ORDER — PAROXETINE HYDROCHLORIDE HEMIHYDRATE 12.5 MG/1
12.5 TABLET, FILM COATED, EXTENDED RELEASE ORAL EVERY MORNING
Qty: 14 TABLET | Refills: 0 | Status: SHIPPED | OUTPATIENT
Start: 2025-02-17 | End: 2026-02-17

## 2025-02-17 RX ORDER — NAPROXEN SODIUM 220 MG/1
81 TABLET, FILM COATED ORAL DAILY
Qty: 90 TABLET | Refills: 3 | Status: SHIPPED | OUTPATIENT
Start: 2025-02-17

## 2025-02-17 NOTE — TELEPHONE ENCOUNTER
Medicine Refill Request    Last Office Visit: 11/4/2024   Last Consult Visit: Visit date not found  Last Telemedicine Visit: Visit date not found    Next Appointment: 5/19/2025      Current Outpatient Medications:     aspirin 81 mg chewable tablet, Take 1 tablet (81 mg total) by mouth daily., Disp: 90 tablet, Rfl: 3    calcium carbonate/vitamin D3 (CALCIUM 500 WITH D ORAL), , Disp: , Rfl:     empagliflozin (JARDIANCE) 10 mg tablet, Take 1 tablet (10 mg total) by mouth daily., Disp: 90 tablet, Rfl: 3    ezetimibe (ZETIA) 10 mg tablet, Take 1 tablet (10 mg total) by mouth nightly., Disp: 90 tablet, Rfl: 3    famotidine (PEPCID) 40 mg tablet, Take 1 tablet (40 mg total) by mouth nightly., Disp: 90 tablet, Rfl: 3    fluticasone propionate (FLONASE) 50 mcg/actuation nasal spray, Administer 1 spray into each nostril daily as needed for rhinitis., Disp: 16 g, Rfl: 0    hydrochlorothiazide (HYDRODIURIL) 25 mg tablet, Take 1 tablet (25 mg total) by mouth daily., Disp: 90 tablet, Rfl: 3    hydrocortisone (ANUSOL-HC) 25 mg suppository, Insert 1 suppository (25 mg total) into the rectum 2 (two) times a day as needed for hemorrhoids., Disp: 20 suppository, Rfl: 0    metoprolol succinate XL (TOPROL-XL) 100 mg 24 hr tablet, Take 1 tablet (100 mg total) by mouth daily., Disp: 90 tablet, Rfl: 3    naproxen (NAPROSYN) 500 mg tablet, Take 1 tablet (500 mg total) by mouth daily., Disp: 90 tablet, Rfl: 3    pantoprazole (PROTONIX) 40 mg EC tablet, Take 1 tablet (40 mg total) by mouth daily., Disp: 90 tablet, Rfl: 1    PARoxetine CR (PAXIL-CR) 12.5 mg 24 hr tablet, Take 1 tablet (12.5 mg total) by mouth every morning., Disp: 90 tablet, Rfl: 3    ramipriL (ALTACE) 10 mg capsule, Take 2 capsules (20 mg total) by mouth daily., Disp: 180 capsule, Rfl: 3    rosuvastatin (CRESTOR) 40 mg tablet, Take 1 tablet (40 mg total) by mouth once daily., Disp: 90 tablet, Rfl: 3    BP Readings from Last 3 Encounters:   11/04/24 130/62   10/14/24 134/82    05/02/24 122/80       Recent Lab results:  Lab Results   Component Value Date    CHOL 166 10/10/2024   ,   Lab Results   Component Value Date    HDL 56 10/10/2024   ,   Lab Results   Component Value Date    LDLCALC 76 10/10/2024   ,   Lab Results   Component Value Date    TRIG 170 (H) 10/10/2024        Lab Results   Component Value Date    GLUCOSE 100 (H) 10/10/2024   ,   Lab Results   Component Value Date    HGBA1C 5.8 (H) 10/10/2024         Lab Results   Component Value Date    CREATININE 1.1 10/10/2024       Lab Results   Component Value Date    TSH 1.430 11/28/2023           Lab Results   Component Value Date    HGBA1C 5.8 (H) 10/10/2024

## 2025-05-05 ENCOUNTER — OFFICE VISIT (OUTPATIENT)
Dept: CARDIOLOGY | Facility: CLINIC | Age: 75
End: 2025-05-05
Payer: COMMERCIAL

## 2025-05-05 VITALS
OXYGEN SATURATION: 99 % | BODY MASS INDEX: 28.51 KG/M2 | HEART RATE: 68 BPM | DIASTOLIC BLOOD PRESSURE: 70 MMHG | HEIGHT: 64 IN | SYSTOLIC BLOOD PRESSURE: 128 MMHG | WEIGHT: 167 LBS

## 2025-05-05 DIAGNOSIS — I65.29 CAROTID ATHEROSCLEROSIS, UNSPECIFIED LATERALITY: ICD-10-CM

## 2025-05-05 DIAGNOSIS — K21.9 GASTROESOPHAGEAL REFLUX DISEASE WITHOUT ESOPHAGITIS: ICD-10-CM

## 2025-05-05 DIAGNOSIS — R01.1 MURMUR: ICD-10-CM

## 2025-05-05 DIAGNOSIS — I10 PRIMARY HYPERTENSION: ICD-10-CM

## 2025-05-05 DIAGNOSIS — E78.5 DYSLIPIDEMIA: Primary | ICD-10-CM

## 2025-05-05 DIAGNOSIS — I77.9 RIGHT-SIDED CAROTID ARTERY DISEASE, UNSPECIFIED TYPE (CMS/HCC): ICD-10-CM

## 2025-05-05 LAB
ATRIAL RATE: 57
P AXIS: 37
PR INTERVAL: 232
QRS DURATION: 94
QT INTERVAL: 440
QTC CALCULATION(BAZETT): 428
R AXIS: -11
T WAVE AXIS: 14
VENTRICULAR RATE: 57

## 2025-05-05 PROCEDURE — 3008F BODY MASS INDEX DOCD: CPT | Performed by: INTERNAL MEDICINE

## 2025-05-05 PROCEDURE — 3074F SYST BP LT 130 MM HG: CPT | Performed by: INTERNAL MEDICINE

## 2025-05-05 PROCEDURE — 99214 OFFICE O/P EST MOD 30 MIN: CPT | Performed by: INTERNAL MEDICINE

## 2025-05-05 PROCEDURE — 93000 ELECTROCARDIOGRAM COMPLETE: CPT | Performed by: INTERNAL MEDICINE

## 2025-05-05 PROCEDURE — 3078F DIAST BP <80 MM HG: CPT | Performed by: INTERNAL MEDICINE

## 2025-05-05 RX ORDER — PANTOPRAZOLE SODIUM 40 MG/1
40 TABLET, DELAYED RELEASE ORAL DAILY
Qty: 90 TABLET | Refills: 3 | Status: SHIPPED | OUTPATIENT
Start: 2025-05-05

## 2025-05-12 ENCOUNTER — HOSPITAL ENCOUNTER (OUTPATIENT)
Dept: RADIOLOGY | Facility: CLINIC | Age: 75
Discharge: HOME | End: 2025-05-12
Attending: INTERNAL MEDICINE
Payer: COMMERCIAL

## 2025-05-12 DIAGNOSIS — Z78.0 MENOPAUSE: ICD-10-CM

## 2025-05-12 PROCEDURE — 77080 DXA BONE DENSITY AXIAL: CPT

## 2025-06-23 ENCOUNTER — OFFICE VISIT (OUTPATIENT)
Dept: PRIMARY CARE | Facility: CLINIC | Age: 75
End: 2025-06-23
Payer: COMMERCIAL

## 2025-06-23 VITALS
OXYGEN SATURATION: 97 % | HEIGHT: 64 IN | BODY MASS INDEX: 29.09 KG/M2 | DIASTOLIC BLOOD PRESSURE: 80 MMHG | HEART RATE: 64 BPM | SYSTOLIC BLOOD PRESSURE: 152 MMHG | WEIGHT: 170.4 LBS | RESPIRATION RATE: 16 BRPM

## 2025-06-23 DIAGNOSIS — E78.5 HYPERLIPIDEMIA, UNSPECIFIED HYPERLIPIDEMIA TYPE: ICD-10-CM

## 2025-06-23 DIAGNOSIS — I65.21 CAROTID STENOSIS, ASYMPTOMATIC, RIGHT: ICD-10-CM

## 2025-06-23 DIAGNOSIS — E04.1 THYROID NODULE: ICD-10-CM

## 2025-06-23 DIAGNOSIS — R73.03 PREDIABETES: ICD-10-CM

## 2025-06-23 DIAGNOSIS — I10 ESSENTIAL HYPERTENSION: Primary | ICD-10-CM

## 2025-06-23 DIAGNOSIS — K21.9 GASTROESOPHAGEAL REFLUX DISEASE WITHOUT ESOPHAGITIS: ICD-10-CM

## 2025-06-23 DIAGNOSIS — Z12.31 OTHER SCREENING MAMMOGRAM: ICD-10-CM

## 2025-06-23 DIAGNOSIS — M85.80 OSTEOPENIA, UNSPECIFIED LOCATION: ICD-10-CM

## 2025-06-23 PROCEDURE — 3079F DIAST BP 80-89 MM HG: CPT | Performed by: INTERNAL MEDICINE

## 2025-06-23 PROCEDURE — 3008F BODY MASS INDEX DOCD: CPT | Performed by: INTERNAL MEDICINE

## 2025-06-23 PROCEDURE — 3077F SYST BP >= 140 MM HG: CPT | Performed by: INTERNAL MEDICINE

## 2025-06-23 PROCEDURE — 99215 OFFICE O/P EST HI 40 MIN: CPT | Performed by: INTERNAL MEDICINE

## 2025-06-23 RX ORDER — PANTOPRAZOLE SODIUM 20 MG/1
20 TABLET, DELAYED RELEASE ORAL DAILY
Qty: 90 TABLET | Refills: 3 | Status: SHIPPED | OUTPATIENT
Start: 2025-06-23 | End: 2026-06-23

## 2025-06-23 NOTE — PROGRESS NOTES
Danyel Adamson MD  Geriatric Medicine        GERIATRIC MEDICINE     Subjective  Patient ID: Forrest Mueller is a 74 y.o. female    _________________________________________  Disease Management Visit    The patient is here for new PCP evaluation and for disease Management for evaluation and management of the following chronic medical problems.    This includes: symptom review, medication regimen review, laboratory monitoring, and review of diagnostic testing.    History of present illness:    6/23/2025 problems discussed at today's visit:  -- Hypertension: Patient is maintained on 3 drug regimen for hypertension.  Altace 20 mg daily, Toprol- mg daily, hydrochlorothiazide 25 mg daily.  Blood pressure on first arrival was 152/80 but she said she tracks it at home and it is not usually in that range.  -- Right carotid stenosis:  Patient has serial carotid studies summarized in Dr. Squires/cardiology progress note from 5/5/2025.  It appears like she had no real significant carotid stenosis on carotid ultrasounds from 2016, 2017, 2019, 2021.  Later in 2021 on 6//21 a carotid ultrasound showed on the right based on peak flow velocity and ratio criteria estimated 50-69% stenosis.  On the left it was 0-49%.  But a study reported in 11/30/2023 read mild noncalcified plaque in the right carotid bulb but the proximal portion of the right internal carotid artery was without stenosis.  Luminal irregularities on the left.  Despite this she has been maintained preventatively with aspirin 81 mg daily.  In addition fairly aggressive treatment for hypercholesterolemia.  -- Hypercholesterolemia: Carotid stenosis as above.  Coronary calcium score was 0 April 2022.  Maintained on highest dose rosuvastatin 40 mg daily as well as Zetia 10 mg daily.  Most recent lipid profiles were:  10/10/2024: Total cholesterol 166 HDL 56 LDL 76  5/15/2025 from a lab in [place]: Total cholesterol 169 HDL 62 LDL 88.  Her cardiologist wants  her to achieve optimally an LDL of at least 80 and has asked her to think about a trial of a PCSK9 inhibitor.  They will discuss again at the next visit.  -- Prediabetes: Patient's most recent hemoglobin A1c 5/15/2025 was 5.9.  She was started on Jardiance 10 mg daily as a preventative treatment by her previous PCP.  Patient is somewhat uncomfortable with this decision as it appears to be inconsistent with most recent guideline based therapy recommendations.  In addition her creatinine most recently went from 1.1 in October 20 24-1.24 in May 2025.  -- GERD/history of gastric peptic ulcer disease: Patient has been maintained on relatively high-dose Pepcid and Protonix with no symptoms currently.  Last EGD and colonoscopy was approximately 9 months ago.  -- Osteopenia: DEXA most recently on 5/12/2025: T-scores: LS spine -1.4, left hip -1.4, right hip -0.9.  No history of fragility fracture.  -- Osteoarthritis right knee: Patient is going for a new orthopedics evaluation.  She gave up skiing at the age of 72 2 years ago.  She ambulates without much in the way of pain but she feels it.  --Generalized anxiety/depression: Patient reports she has been maintained on Paxil CR for 10 years or so.  Unclear whether she was taking it for depression or anxiety but when she tried to do gradual dose reduction abruptly she got very anxious so she resumed it and that was 8 or 9 years ago.  -- Prevention:  Patient has a history of colon cancer in first-degree relative.  She reports last colonoscopy was 2024 at the age of 73.  We discussed that if she is on a 5-year schedule she will have 1 more screening colonoscopy before the age of 81.      --Comprehensive medication reconciliation was performed at today's visit.  Medication reconciliation:  Aspirin 81 mg daily  Ramipril 20 mg daily  Toprol- mg daily  Hydrochlorothiazide 25 mg daily  Crestor 40 mg daily  Zetia 10 mg daily  Jardiance 10 mg daily  Protonix 40 mg daily in the  "morning  Pepcid 40 mg daily at night  Naprosyn 500 mg as needed  Paxil-CR 12.5 mg daily    ______________________________________________    Recent visit HPIs:       Past medical history:  As above and    Vaccination summary:  Influenza fall 2024  COVID 10/15/2023  Pneumovax 23 3/2/2020  Prevnar 13 11/28/2017  Tdap 9/23/2015  Shingrix 11/5/2020  -unclear if there was a second dose       Review of systems  Positives: As above arthritis of the knee,  Negatives:  No fever/chills, chest pain, shortness of breath, palpitations, nausea/vomiting, abdominal pain, hematuria, dysuria, tremor      Physical exam  Visit Vitals  BP (!) 152/80 (BP Location: Right upper arm, Patient Position: Sitting)   Pulse 64   Resp 16   Ht 1.626 m (5' 4\")   Wt 77.3 kg (170 lb 6.4 oz)   LMP  (LMP Unknown)   SpO2 97%   BMI 29.25 kg/m²         HEENT no conjunctival injection  Neck supple, thyroid without palpable enlargement or nodules  Chest clear to auscultation no wheezes rales or rhonchi  Cardiac regular rate and rhythm  Abdomen NABS soft no tenderness  Extremities no significant edema  Neuro awake and alert, affect normal.,  Gait stable, Romberg negative      Medical decision making  Assessment/plan     Diagnosis Plan   1. Essential hypertension  Comprehensive metabolic panel      2. Hyperlipidemia, unspecified hyperlipidemia type        3. Carotid stenosis, asymptomatic, right        4. Prediabetes        5. Gastroesophageal reflux disease without esophagitis        6. Osteopenia, unspecified location        7. Other screening mammogram  BI SCREENING MAMMOGRAM BILATERAL(TOMOSYNTHESIS)      8. Thyroid nodule  ULTRASOUND THYROID          __________________________________________  CONSOLIDATED ASSESSMENT AND PLAN:  -the below A/P is the consolidated plan for the above conditions:  _____    Hypertension: Blood pressure well-controlled on current regimen.  Continue current treatment.    Right carotid stenosis/hypercholesterolemia:  Somewhat " unclear the severity of her right carotid stenosis as recent ultrasound studies seemed a bit in conflict.  This is at any rate asymptomatic.  Strong family history of stroke so she is agreeable to continuing current treatment including aggressive treatment for hypercholesterolemia with high-dose statin with rosuvastatin 40 mg as well as Zetia 10 mg daily.  Considering more aggressive approaches to reach LDL less than 70 per cardiology.  Note: Coronary calcium score was 0 in 2022.    Borderline diabetes:  Patient started on Jardiance 10 mg daily for prevention.  We discussed that this appears to be not consistent with current guideline based practice.  Although metformin is sometimes used preventatively this appears to be somewhat empiric.  In addition Jardiance is reported to be associated with TRACY and her creatinine was up at 1.4 on most recent check with [place] labs May 2025.  Previous baseline creatinine was 1.1.  Patient chooses to stop Jardiance and recheck CMP and creatinine in 1 month.  Continue to monitor hemoglobin A1c periodically.    GERD/peptic ulcer disease history:  Patient is asymptomatic at this time.  I think it is reasonable to try gradual dose reduction of Protonix down to 20 mg daily.  Continue Pepcid 40 mg at bedtime.  We also discussed that PPIs can be associated with accelerated osteoporosis so if there is an additional reason to try gradual dose reduction.  Gastric peptic ulcer was seen at EGD fall 2025.  Unclear if she was taking both aspirin and Naprosyn at that time.  Message left for patient on her cell phone voicemail to discontinue Naprosyn if she is taking it as well as to discontinue it to recheck creatinine clearance as below.  [facility]    Elevated creatinine: Pending go from 1.1 which was her baseline up to 1.4 on most recent testing at the [place] lab on 5/15/2025.  Possibilities include that she was somewhat dry when this was checked.  NSAID  effective Naprosyn as above.  May also be related to Jardiance treatment as TRACY is reported with Jardiance.  Recommendation to stop Jardiance as above.  Recheck CMP in about 4 weeks.  Also recommended to discontinue Naprosyn as above and replace with Tylenol.    Osteoarthritis knee:  Recommend discontinue Naprosyn and use Tylenol 1000 mg 3 times daily religiously straight dosing as a trial.    Generalized anxiety/depression:  Recommend gradual dose reduction of Paxil CR to 1 tablet every other day for about a month.  If stable go to 1 tablet every third day for a month.  If then stable could discontinue.    Thyroid nodules:  Patient has thyroid nodules going back many years.  Most recently stable on thyroid ultrasound from 2021.  She has not had this recently.  Recommend check baseline follow-up thyroid ultrasound at this time.    Prevention:  It does not appear patient has had RSV vaccine yet.   She is also due for summer COVID booster.  She will get this at local pharmacy.      Return visit to follow-up on and reevaluate all of the above about 2 months.     66   minutes on this date of service was spent by Dr. Adamson  performing the following activities: Previsit medical record review, diagnostic testing review, medical consult report review, obtaining history from patient, performing examination, entering orders, comprehensive medication reconciliation, counseling regarding evaluation and management of the above conditions as detailed above, and documentation of this visit.

## 2025-07-02 ENCOUNTER — HOSPITAL ENCOUNTER (OUTPATIENT)
Dept: RADIOLOGY | Facility: CLINIC | Age: 75
Discharge: HOME | End: 2025-07-02
Attending: INTERNAL MEDICINE
Payer: COMMERCIAL

## 2025-07-02 ENCOUNTER — HOSPITAL ENCOUNTER (OUTPATIENT)
Dept: RADIOLOGY | Facility: CLINIC | Age: 75
Discharge: HOME | End: 2025-07-02
Attending: PODIATRIST
Payer: COMMERCIAL

## 2025-07-02 DIAGNOSIS — M25.872 PREDISLOCATION SYNDROME OF METATARSOPHALANGEAL JOINT, LEFT: ICD-10-CM

## 2025-07-02 DIAGNOSIS — E04.1 THYROID NODULE: ICD-10-CM

## 2025-07-02 DIAGNOSIS — M25.871 SUBFIBULAR IMPINGEMENT, RIGHT: ICD-10-CM

## 2025-07-02 PROCEDURE — 76536 US EXAM OF HEAD AND NECK: CPT

## 2025-07-02 PROCEDURE — 77067 SCR MAMMO BI INCL CAD: CPT

## 2025-07-02 PROCEDURE — 73630 X-RAY EXAM OF FOOT: CPT | Mod: 50

## 2025-08-22 ENCOUNTER — APPOINTMENT (OUTPATIENT)
Dept: LAB | Facility: CLINIC | Age: 75
End: 2025-08-22
Attending: INTERNAL MEDICINE
Payer: COMMERCIAL

## 2025-08-22 DIAGNOSIS — I10 ESSENTIAL HYPERTENSION: ICD-10-CM

## 2025-08-22 LAB
ALBUMIN SERPL-MCNC: 4.2 G/DL (ref 3.5–5.7)
ALP SERPL-CCNC: 45 IU/L (ref 34–125)
ALT SERPL-CCNC: 41 IU/L (ref 7–52)
ANION GAP SERPL CALC-SCNC: 7 MEQ/L (ref 3–15)
AST SERPL-CCNC: 33 IU/L (ref 13–39)
BILIRUB SERPL-MCNC: 0.4 MG/DL (ref 0.3–1.2)
BUN SERPL-MCNC: 27 MG/DL (ref 7–25)
CALCIUM SERPL-MCNC: 9.3 MG/DL (ref 8.6–10.3)
CHLORIDE SERPL-SCNC: 108 MEQ/L (ref 98–107)
CO2 SERPL-SCNC: 27 MEQ/L (ref 21–31)
CREAT SERPL-MCNC: 1 MG/DL (ref 0.6–1.2)
EGFRCR SERPLBLD CKD-EPI 2021: 59.6 ML/MIN/1.73M*2
GLUCOSE SERPL-MCNC: 98 MG/DL (ref 70–99)
POTASSIUM SERPL-SCNC: 4.6 MEQ/L (ref 3.5–5.1)
PROT SERPL-MCNC: 6.4 G/DL (ref 6–8.2)
SODIUM SERPL-SCNC: 142 MEQ/L (ref 136–145)

## 2025-08-22 PROCEDURE — 36415 COLL VENOUS BLD VENIPUNCTURE: CPT

## 2025-08-22 PROCEDURE — 80053 COMPREHEN METABOLIC PANEL: CPT

## 2025-08-25 ENCOUNTER — OFFICE VISIT (OUTPATIENT)
Dept: PRIMARY CARE | Facility: CLINIC | Age: 75
End: 2025-08-25
Payer: COMMERCIAL

## 2025-08-25 VITALS
HEART RATE: 69 BPM | HEIGHT: 64 IN | BODY MASS INDEX: 28.95 KG/M2 | OXYGEN SATURATION: 97 % | DIASTOLIC BLOOD PRESSURE: 64 MMHG | SYSTOLIC BLOOD PRESSURE: 126 MMHG | WEIGHT: 169.6 LBS | RESPIRATION RATE: 16 BRPM

## 2025-08-25 DIAGNOSIS — F41.1 GENERALIZED ANXIETY DISORDER: ICD-10-CM

## 2025-08-25 DIAGNOSIS — R73.03 PREDIABETES: ICD-10-CM

## 2025-08-25 DIAGNOSIS — E04.1 THYROID NODULE: ICD-10-CM

## 2025-08-25 DIAGNOSIS — I10 ESSENTIAL HYPERTENSION: Primary | ICD-10-CM

## 2025-08-25 DIAGNOSIS — I65.21 CAROTID STENOSIS, ASYMPTOMATIC, RIGHT: ICD-10-CM

## 2025-08-25 DIAGNOSIS — K27.9 PEPTIC ULCER DISEASE: ICD-10-CM

## 2025-08-25 DIAGNOSIS — E78.5 HYPERLIPIDEMIA, UNSPECIFIED HYPERLIPIDEMIA TYPE: ICD-10-CM

## 2025-08-25 PROCEDURE — 3008F BODY MASS INDEX DOCD: CPT | Performed by: INTERNAL MEDICINE

## 2025-08-25 PROCEDURE — 3078F DIAST BP <80 MM HG: CPT | Performed by: INTERNAL MEDICINE

## 2025-08-25 PROCEDURE — 3074F SYST BP LT 130 MM HG: CPT | Performed by: INTERNAL MEDICINE

## 2025-08-25 PROCEDURE — 99215 OFFICE O/P EST HI 40 MIN: CPT | Performed by: INTERNAL MEDICINE

## 2025-08-25 RX ORDER — SERTRALINE HYDROCHLORIDE 25 MG/1
25 TABLET, FILM COATED ORAL DAILY
Qty: 90 TABLET | Refills: 3 | Status: SHIPPED | OUTPATIENT
Start: 2025-08-25 | End: 2026-08-25